# Patient Record
Sex: MALE | Race: WHITE | NOT HISPANIC OR LATINO | URBAN - METROPOLITAN AREA
[De-identification: names, ages, dates, MRNs, and addresses within clinical notes are randomized per-mention and may not be internally consistent; named-entity substitution may affect disease eponyms.]

---

## 2018-11-24 ENCOUNTER — EMERGENCY (EMERGENCY)
Facility: HOSPITAL | Age: 63
LOS: 1 days | End: 2018-11-24
Payer: COMMERCIAL

## 2018-11-24 PROCEDURE — 99284 EMERGENCY DEPT VISIT MOD MDM: CPT

## 2018-11-24 PROCEDURE — 71045 X-RAY EXAM CHEST 1 VIEW: CPT | Mod: 26

## 2018-11-24 PROCEDURE — 70450 CT HEAD/BRAIN W/O DYE: CPT | Mod: 26

## 2019-11-25 ENCOUNTER — APPOINTMENT (OUTPATIENT)
Dept: RADIOLOGY | Facility: CLINIC | Age: 64
End: 2019-11-25
Payer: COMMERCIAL

## 2019-11-25 PROCEDURE — 73502 X-RAY EXAM HIP UNI 2-3 VIEWS: CPT | Mod: LT

## 2019-11-25 PROCEDURE — 73562 X-RAY EXAM OF KNEE 3: CPT | Mod: LT

## 2020-07-27 PROBLEM — Z00.00 ENCOUNTER FOR PREVENTIVE HEALTH EXAMINATION: Status: ACTIVE | Noted: 2020-07-27

## 2020-07-28 ENCOUNTER — OUTPATIENT (OUTPATIENT)
Dept: OUTPATIENT SERVICES | Facility: HOSPITAL | Age: 65
LOS: 1 days | End: 2020-07-28

## 2020-08-08 ENCOUNTER — APPOINTMENT (OUTPATIENT)
Dept: DISASTER EMERGENCY | Facility: CLINIC | Age: 65
End: 2020-08-08

## 2020-08-08 DIAGNOSIS — Z01.818 ENCOUNTER FOR OTHER PREPROCEDURAL EXAMINATION: ICD-10-CM

## 2020-08-09 LAB — SARS-COV-2 N GENE NPH QL NAA+PROBE: NOT DETECTED

## 2020-08-11 ENCOUNTER — OUTPATIENT (OUTPATIENT)
Dept: OUTPATIENT SERVICES | Facility: HOSPITAL | Age: 65
LOS: 1 days | End: 2020-08-11

## 2020-08-11 ENCOUNTER — INPATIENT (INPATIENT)
Facility: HOSPITAL | Age: 65
LOS: 0 days | Discharge: HOME HEALTH | End: 2020-08-12
Payer: COMMERCIAL

## 2020-08-11 PROCEDURE — 72170 X-RAY EXAM OF PELVIS: CPT | Mod: 26

## 2020-08-12 ENCOUNTER — OUTPATIENT (OUTPATIENT)
Dept: OUTPATIENT SERVICES | Facility: HOSPITAL | Age: 65
LOS: 1 days | End: 2020-08-12

## 2020-08-24 ENCOUNTER — APPOINTMENT (OUTPATIENT)
Dept: ULTRASOUND IMAGING | Facility: CLINIC | Age: 65
End: 2020-08-24
Payer: COMMERCIAL

## 2020-08-24 PROCEDURE — 93970 EXTREMITY STUDY: CPT

## 2021-02-23 ENCOUNTER — APPOINTMENT (OUTPATIENT)
Dept: ULTRASOUND IMAGING | Facility: CLINIC | Age: 66
End: 2021-02-23
Payer: COMMERCIAL

## 2021-02-23 ENCOUNTER — APPOINTMENT (OUTPATIENT)
Dept: RADIOLOGY | Facility: CLINIC | Age: 66
End: 2021-02-23

## 2021-02-23 ENCOUNTER — APPOINTMENT (OUTPATIENT)
Dept: UROLOGY | Facility: CLINIC | Age: 66
End: 2021-02-23
Payer: COMMERCIAL

## 2021-02-23 VITALS
DIASTOLIC BLOOD PRESSURE: 83 MMHG | SYSTOLIC BLOOD PRESSURE: 125 MMHG | HEIGHT: 67 IN | BODY MASS INDEX: 36.1 KG/M2 | TEMPERATURE: 98.3 F | WEIGHT: 230 LBS | HEART RATE: 73 BPM

## 2021-02-23 DIAGNOSIS — Z78.9 OTHER SPECIFIED HEALTH STATUS: ICD-10-CM

## 2021-02-23 DIAGNOSIS — R39.9 UNSPECIFIED SYMPTOMS AND SIGNS INVOLVING THE GENITOURINARY SYSTEM: ICD-10-CM

## 2021-02-23 DIAGNOSIS — Z86.73 PERSONAL HISTORY OF TRANSIENT ISCHEMIC ATTACK (TIA), AND CEREBRAL INFARCTION W/OUT RESIDUAL DEFICITS: ICD-10-CM

## 2021-02-23 DIAGNOSIS — Z82.49 FAMILY HISTORY OF ISCHEMIC HEART DISEASE AND OTHER DISEASES OF THE CIRCULATORY SYSTEM: ICD-10-CM

## 2021-02-23 LAB
BILIRUB UR QL STRIP: NEGATIVE
CLARITY UR: CLEAR
COLLECTION METHOD: NORMAL
GLUCOSE UR-MCNC: NEGATIVE
HCG UR QL: 0.2 EU/DL
HGB UR QL STRIP.AUTO: NEGATIVE
KETONES UR-MCNC: NEGATIVE
LEUKOCYTE ESTERASE UR QL STRIP: NORMAL
NITRITE UR QL STRIP: NEGATIVE
PH UR STRIP: 5
PROT UR STRIP-MCNC: NEGATIVE
SP GR UR STRIP: 1.02

## 2021-02-23 PROCEDURE — 74018 RADEX ABDOMEN 1 VIEW: CPT

## 2021-02-23 PROCEDURE — 99204 OFFICE O/P NEW MOD 45 MIN: CPT | Mod: 25

## 2021-02-23 PROCEDURE — 76775 US EXAM ABDO BACK WALL LIM: CPT

## 2021-02-23 PROCEDURE — 76870 US EXAM SCROTUM: CPT

## 2021-02-23 PROCEDURE — 81003 URINALYSIS AUTO W/O SCOPE: CPT | Mod: QW

## 2021-02-23 PROCEDURE — 99072 ADDL SUPL MATRL&STAF TM PHE: CPT

## 2021-02-23 PROCEDURE — 51798 US URINE CAPACITY MEASURE: CPT

## 2021-02-23 NOTE — LETTER BODY
[Dear  ___] : Dear  [unfilled], [Courtesy Letter:] : I had the pleasure of seeing your patient, [unfilled], in my office today. [Please see my note below.] : Please see my note below. [Sincerely,] : Sincerely, [FreeTextEntry3] : Ed\par \par Jamal Hernandez MD\par [facility]\par  of Urology\par [employer] at [facility]\par

## 2021-02-23 NOTE — REVIEW OF SYSTEMS
[Eyesight Problems] : eyesight problems [Hesitancy] : urinary hesitancy [Nocturia] : nocturia [Testicular Pain] : testicular pain [Seen by urologist before (Name)  ___] : Preciously seen by a urologist: [unfilled] [Urine Infection (bladder/kidney)] : bladder/kidney infection [Pain during urination] : pain during urination [Blood in urine that you can see] : blood visible in urine [Told you have blood in urine on a urine test] : told blood was present in a urine test [History of kidney stones] : history of kidney stones [Urine retention] : urine retention [Wake up at night to urinate  How many times?  ___] : wakes up to urinate [unfilled] times during the night [Strong urge to urinate] : strong urge to urinate [Bladder pressure] : experiences bladder pressure [Bladder fullness after urinating] : bladder fullness after urinating [Increased pain/discomfort with bladder filling] : increased pain/discomfort with bladder filling [Dizziness] : dizziness [Negative] : Heme/Lymph [FreeTextEntry2] : hbp

## 2021-02-23 NOTE — HISTORY OF PRESENT ILLNESS
[FreeTextEntry1] : keila had an ESWL.  he was diagnosed because he was having pain.  no urgency.  He had some nocturia.  he also had some scrotal pain after that. He had a "water procedure" and he emptied  well. Then was good with a good stream. no urgency.  he snores a little.  he was diagnosed with sleep apnea and was given CPAP but he did not continue to use.it.  \par Was subsequently sen at [place] and diagnosed with likely epididymitis. Cipro given.

## 2021-02-23 NOTE — ASSESSMENT
[FreeTextEntry1] : histoyr fo stone and BPH\par no imaging recently\par epididymitis\par \par Plan"\par \par renal and scrotal ultrasound\par continue his antibiotics\par Follow up two weeks.

## 2021-03-16 ENCOUNTER — APPOINTMENT (OUTPATIENT)
Dept: UROLOGY | Facility: CLINIC | Age: 66
End: 2021-03-16
Payer: COMMERCIAL

## 2021-03-16 VITALS
BODY MASS INDEX: 36.88 KG/M2 | DIASTOLIC BLOOD PRESSURE: 66 MMHG | TEMPERATURE: 97.6 F | SYSTOLIC BLOOD PRESSURE: 124 MMHG | HEART RATE: 74 BPM | WEIGHT: 235 LBS | HEIGHT: 67 IN

## 2021-03-16 DIAGNOSIS — N45.1 EPIDIDYMITIS: ICD-10-CM

## 2021-03-16 PROCEDURE — 99072 ADDL SUPL MATRL&STAF TM PHE: CPT

## 2021-03-16 PROCEDURE — 99214 OFFICE O/P EST MOD 30 MIN: CPT

## 2021-03-16 RX ORDER — CIPROFLOXACIN HYDROCHLORIDE 500 MG/1
500 TABLET, FILM COATED ORAL TWICE DAILY
Qty: 20 | Refills: 0 | Status: DISCONTINUED | COMMUNITY
Start: 2021-02-23 | End: 2021-03-16

## 2021-03-16 NOTE — LETTER BODY
[Dear  ___] : Dear  [unfilled], [Courtesy Letter:] : I had the pleasure of seeing your patient, [unfilled], in my office today. [Please see my note below.] : Please see my note below. [FreeTextEntry3] : Ed\par \par Jamal Hernandez MD\par [facility]\par  of Urology\par [employer] at [facility]\par

## 2021-03-16 NOTE — ASSESSMENT
[FreeTextEntry1] : Impression:\par \par right kidney stones\par resolved epididymitis\par \par plan:\par \par CT stone\par follow up in one week.

## 2021-03-16 NOTE — HISTORY OF PRESENT ILLNESS
[FreeTextEntry1] : patient finished cipro about a week ago.  no fevers or chills. voiding ok. no urgency. no hematuria or dysuria.\par History of stones.  had previous ESWL.  I t has been over 6 months. \par \par

## 2021-03-16 NOTE — PHYSICAL EXAM
[General Appearance - Well Developed] : well developed [General Appearance - Well Nourished] : well nourished [Normal Appearance] : normal appearance [Well Groomed] : well groomed [General Appearance - In No Acute Distress] : no acute distress [FreeTextEntry1] : no testicular swelling [] : no respiratory distress [Respiration, Rhythm And Depth] : normal respiratory rhythm and effort [Exaggerated Use Of Accessory Muscles For Inspiration] : no accessory muscle use [Oriented To Time, Place, And Person] : oriented to person, place, and time [Affect] : the affect was normal [Mood] : the mood was normal [Not Anxious] : not anxious [Normal Station and Gait] : the gait and station were normal for the patient's age

## 2021-03-19 ENCOUNTER — RESULT REVIEW (OUTPATIENT)
Age: 66
End: 2021-03-19

## 2021-03-19 ENCOUNTER — APPOINTMENT (OUTPATIENT)
Dept: CT IMAGING | Facility: CLINIC | Age: 66
End: 2021-03-19
Payer: COMMERCIAL

## 2021-03-19 PROCEDURE — 74176 CT ABD & PELVIS W/O CONTRAST: CPT

## 2021-03-23 ENCOUNTER — APPOINTMENT (OUTPATIENT)
Dept: UROLOGY | Facility: CLINIC | Age: 66
End: 2021-03-23
Payer: MEDICARE

## 2021-03-23 VITALS
BODY MASS INDEX: 36.88 KG/M2 | HEART RATE: 73 BPM | WEIGHT: 235 LBS | SYSTOLIC BLOOD PRESSURE: 129 MMHG | DIASTOLIC BLOOD PRESSURE: 69 MMHG | HEIGHT: 67 IN | TEMPERATURE: 97.4 F

## 2021-03-23 PROCEDURE — 99214 OFFICE O/P EST MOD 30 MIN: CPT

## 2021-03-23 PROCEDURE — 99072 ADDL SUPL MATRL&STAF TM PHE: CPT

## 2021-03-23 NOTE — ASSESSMENT
[FreeTextEntry1] : Impression:\par \par right large stones, total stone burden 2.4 cm at minimum.\par \par I have discussed the risks, benefits and alternatives of percutaneous nephrolithotomy with the patient, including but not limited to renal injury, ureteral injury, inability to fragment or completely fragment the stone, residual stone, bleeding either within or around the kidney, prolonged urine leak, urinoma, delayed bleeding from AV fistula, need for second look procedure, either planned or not, infection including sepsis, propagation of a stone fragment into the ureter. The patient also understands the likelihood that a nephrostomy tube will be required.\par Also, because the procedure will require general anesthesia, risks inherent to general anesthesia such as heart attack, irregular heartbeat, pneumonia, or even death may occur. It is understood these risks are highly unlikely but not zero. \par The patient’s questions were answered.\par \par \par \par

## 2021-03-23 NOTE — LETTER BODY
[Dear  ___] : Dear  [unfilled], [Courtesy Letter:] : I had the pleasure of seeing your patient, [unfilled], in my office today. [Please see my note below.] : Please see my note below. [Sincerely,] : Sincerely, [FreeTextEntry3] : Ed\par \par Jamal Hernandez MD\par [facility]\par  of Urology\par [employer] at [facility]\par

## 2021-03-23 NOTE — HISTORY OF PRESENT ILLNESS
[FreeTextEntry1] : patient presents in followup.  He has been having bilateral intermittent bilateral CVA discomfort. no nausea or vomiting.  no fevers or chills.

## 2021-03-25 ENCOUNTER — NON-APPOINTMENT (OUTPATIENT)
Age: 66
End: 2021-03-25

## 2021-04-09 ENCOUNTER — OUTPATIENT (OUTPATIENT)
Dept: OUTPATIENT SERVICES | Facility: HOSPITAL | Age: 66
LOS: 1 days | End: 2021-04-09
Payer: COMMERCIAL

## 2021-04-09 VITALS
RESPIRATION RATE: 18 BRPM | TEMPERATURE: 97 F | SYSTOLIC BLOOD PRESSURE: 156 MMHG | HEART RATE: 62 BPM | HEIGHT: 68 IN | DIASTOLIC BLOOD PRESSURE: 82 MMHG | WEIGHT: 238.1 LBS

## 2021-04-09 DIAGNOSIS — N20.0 CALCULUS OF KIDNEY: ICD-10-CM

## 2021-04-09 DIAGNOSIS — Z01.818 ENCOUNTER FOR OTHER PREPROCEDURAL EXAMINATION: ICD-10-CM

## 2021-04-09 DIAGNOSIS — I10 ESSENTIAL (PRIMARY) HYPERTENSION: ICD-10-CM

## 2021-04-09 DIAGNOSIS — Z96.642 PRESENCE OF LEFT ARTIFICIAL HIP JOINT: Chronic | ICD-10-CM

## 2021-04-09 DIAGNOSIS — Z29.9 ENCOUNTER FOR PROPHYLACTIC MEASURES, UNSPECIFIED: ICD-10-CM

## 2021-04-09 DIAGNOSIS — Z91.89 OTHER SPECIFIED PERSONAL RISK FACTORS, NOT ELSEWHERE CLASSIFIED: ICD-10-CM

## 2021-04-09 LAB
A1C WITH ESTIMATED AVERAGE GLUCOSE RESULT: 6 % — HIGH (ref 4–5.6)
ANION GAP SERPL CALC-SCNC: 12 MMOL/L — SIGNIFICANT CHANGE UP (ref 5–17)
APPEARANCE UR: CLEAR — SIGNIFICANT CHANGE UP
APTT BLD: 29.2 SEC — SIGNIFICANT CHANGE UP (ref 27.5–35.5)
BACTERIA # UR AUTO: NEGATIVE — SIGNIFICANT CHANGE UP
BASOPHILS # BLD AUTO: 0.03 K/UL — SIGNIFICANT CHANGE UP (ref 0–0.2)
BASOPHILS NFR BLD AUTO: 0.5 % — SIGNIFICANT CHANGE UP (ref 0–2)
BILIRUB UR-MCNC: NEGATIVE — SIGNIFICANT CHANGE UP
BLD GP AB SCN SERPL QL: SIGNIFICANT CHANGE UP
BUN SERPL-MCNC: 19 MG/DL — SIGNIFICANT CHANGE UP (ref 8–20)
CALCIUM SERPL-MCNC: 9.1 MG/DL — SIGNIFICANT CHANGE UP (ref 8.6–10.2)
CHLORIDE SERPL-SCNC: 105 MMOL/L — SIGNIFICANT CHANGE UP (ref 98–107)
CO2 SERPL-SCNC: 24 MMOL/L — SIGNIFICANT CHANGE UP (ref 22–29)
COLOR SPEC: YELLOW — SIGNIFICANT CHANGE UP
CREAT SERPL-MCNC: 0.99 MG/DL — SIGNIFICANT CHANGE UP (ref 0.5–1.3)
DIFF PNL FLD: ABNORMAL
EOSINOPHIL # BLD AUTO: 0.19 K/UL — SIGNIFICANT CHANGE UP (ref 0–0.5)
EOSINOPHIL NFR BLD AUTO: 2.9 % — SIGNIFICANT CHANGE UP (ref 0–6)
EPI CELLS # UR: SIGNIFICANT CHANGE UP
ESTIMATED AVERAGE GLUCOSE: 126 MG/DL — HIGH (ref 68–114)
GLUCOSE SERPL-MCNC: 112 MG/DL — HIGH (ref 70–99)
GLUCOSE UR QL: NEGATIVE MG/DL — SIGNIFICANT CHANGE UP
HCT VFR BLD CALC: 49.4 % — SIGNIFICANT CHANGE UP (ref 39–50)
HGB BLD-MCNC: 16.7 G/DL — SIGNIFICANT CHANGE UP (ref 13–17)
IMM GRANULOCYTES NFR BLD AUTO: 0.5 % — SIGNIFICANT CHANGE UP (ref 0–1.5)
INR BLD: 1 RATIO — SIGNIFICANT CHANGE UP (ref 0.88–1.16)
KETONES UR-MCNC: NEGATIVE — SIGNIFICANT CHANGE UP
LEUKOCYTE ESTERASE UR-ACNC: NEGATIVE — SIGNIFICANT CHANGE UP
LYMPHOCYTES # BLD AUTO: 1.23 K/UL — SIGNIFICANT CHANGE UP (ref 1–3.3)
LYMPHOCYTES # BLD AUTO: 18.8 % — SIGNIFICANT CHANGE UP (ref 13–44)
MCHC RBC-ENTMCNC: 31.5 PG — SIGNIFICANT CHANGE UP (ref 27–34)
MCHC RBC-ENTMCNC: 33.8 GM/DL — SIGNIFICANT CHANGE UP (ref 32–36)
MCV RBC AUTO: 93 FL — SIGNIFICANT CHANGE UP (ref 80–100)
MONOCYTES # BLD AUTO: 0.89 K/UL — SIGNIFICANT CHANGE UP (ref 0–0.9)
MONOCYTES NFR BLD AUTO: 13.6 % — SIGNIFICANT CHANGE UP (ref 2–14)
NEUTROPHILS # BLD AUTO: 4.17 K/UL — SIGNIFICANT CHANGE UP (ref 1.8–7.4)
NEUTROPHILS NFR BLD AUTO: 63.7 % — SIGNIFICANT CHANGE UP (ref 43–77)
NITRITE UR-MCNC: NEGATIVE — SIGNIFICANT CHANGE UP
PH UR: 6 — SIGNIFICANT CHANGE UP (ref 5–8)
PLATELET # BLD AUTO: 213 K/UL — SIGNIFICANT CHANGE UP (ref 150–400)
POTASSIUM SERPL-MCNC: 4.3 MMOL/L — SIGNIFICANT CHANGE UP (ref 3.5–5.3)
POTASSIUM SERPL-SCNC: 4.3 MMOL/L — SIGNIFICANT CHANGE UP (ref 3.5–5.3)
PROT UR-MCNC: 15 MG/DL
PROTHROM AB SERPL-ACNC: 11.6 SEC — SIGNIFICANT CHANGE UP (ref 10.6–13.6)
RBC # BLD: 5.31 M/UL — SIGNIFICANT CHANGE UP (ref 4.2–5.8)
RBC # FLD: 13.3 % — SIGNIFICANT CHANGE UP (ref 10.3–14.5)
RBC CASTS # UR COMP ASSIST: SIGNIFICANT CHANGE UP /HPF (ref 0–4)
SODIUM SERPL-SCNC: 141 MMOL/L — SIGNIFICANT CHANGE UP (ref 135–145)
SP GR SPEC: 1.02 — SIGNIFICANT CHANGE UP (ref 1.01–1.02)
UROBILINOGEN FLD QL: NEGATIVE MG/DL — SIGNIFICANT CHANGE UP
WBC # BLD: 6.54 K/UL — SIGNIFICANT CHANGE UP (ref 3.8–10.5)
WBC # FLD AUTO: 6.54 K/UL — SIGNIFICANT CHANGE UP (ref 3.8–10.5)
WBC UR QL: SIGNIFICANT CHANGE UP

## 2021-04-09 PROCEDURE — 93005 ELECTROCARDIOGRAM TRACING: CPT

## 2021-04-09 PROCEDURE — 93010 ELECTROCARDIOGRAM REPORT: CPT

## 2021-04-09 PROCEDURE — G0463: CPT

## 2021-04-09 RX ORDER — SODIUM CHLORIDE 9 MG/ML
3 INJECTION INTRAMUSCULAR; INTRAVENOUS; SUBCUTANEOUS ONCE
Refills: 0 | Status: DISCONTINUED | OUTPATIENT
Start: 2021-04-30 | End: 2021-05-01

## 2021-04-09 NOTE — H&P PST ADULT - GASTROINTESTINAL DETAILS
soft/nontender/no distention/no masses palpable/no bruit/no rebound tenderness/no guarding/no rigidity/no organomegaly

## 2021-04-09 NOTE — H&P PST ADULT - HISTORY OF PRESENT ILLNESS
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, renal calculi, frequent UTIs, surgical history of kidney stones lithotripsy x2  reports to PST today with complaints of intermittent right flank pain, describes pain as dull and aching, denies exacerbating factors, relieved with tylenol, reports urine is orange in color  denies pain with urination, hematuria, incontinence, retention, fever chills  Patient is scheduled for right percutaneous nephrolithotomy  on 4/30/21 with Dr Hernandez  Medical and cardiac clearance pending  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 Patient reports to PST today with complaints of intermittent right flank pain, describes pain as dull and aching, denies exacerbating factors, relieved with tylenol, reports urine is orange in color. Patient denies pain with urination, hematuria, incontinence, retention, fever and chills. Patient is scheduled for right percutaneous nephrolithotomy  on 4/30/21 with Dr Hernandez  Medical and cardiac clearance pending

## 2021-04-09 NOTE — H&P PST ADULT - ASSESSMENT
CAPRINI VTE 2.0 SCORE [CLOT updated 2019]    AGE RELATED RISK FACTORS                                                       MOBILITY RELATED FACTORS  [ ] Age 41-60 years                                            (1 Point)                    [ ] Bed rest                                                        (1 Point)  [x ] Age: 61-74 years                                           (2 Points)                  [ ] Plaster cast                                                   (2 Points)  [ ] Age= 75 years                                              (3 Points)                    [ ] Bed bound for more than 72 hours                 (2 Points)    DISEASE RELATED RISK FACTORS                                               GENDER SPECIFIC FACTORS  [ ] Edema in the lower extremities                       (1 Point)              [ ] Pregnancy                                                     (1 Point)  [ ] Varicose veins                                               (1 Point)                     [ ] Post-partum < 6 weeks                                   (1 Point)             [x ] BMI > 25 Kg/m2                                            (1 Point)                     [ ] Hormonal therapy  or oral contraception          (1 Point)                 [ ] Sepsis (in the previous month)                        (1 Point)               [ ] History of pregnancy complications                 (1 point)  [ ] Pneumonia or serious lung disease                                               [ ] Unexplained or recurrent                     (1 Point)           (in the previous month)                               (1 Point)  [ ] Abnormal pulmonary function test                     (1 Point)                 SURGERY RELATED RISK FACTORS  [ ] Acute myocardial infarction                              (1 Point)               [ ]  Section                                             (1 Point)  [ ] Congestive heart failure (in the previous month)  (1 Point)      [ ] Minor surgery                                                  (1 Point)   [ ] Inflammatory bowel disease                             (1 Point)               [ ] Arthroscopic surgery                                        (2 Points)  [ ] Central venous access                                      (2 Points)                [x ] General surgery lasting more than 45 minutes (2 points)  [ ] Malignancy- Present or previous                   (2 Points)                [ ] Elective arthroplasty                                         (5 points)    [ ] Stroke (in the previous month)                          (5 Points)                                                                                                                                                           HEMATOLOGY RELATED FACTORS                                                 TRAUMA RELATED RISK FACTORS  [ ] Prior episodes of VTE                                     (3 Points)                [ ] Fracture of the hip, pelvis, or leg                       (5 Points)  [ ] Positive family history for VTE                         (3 Points)             [ ] Acute spinal cord injury (in the previous month)  (5 Points)  [ ] Prothrombin 12318 A                                     (3 Points)               [ ] Paralysis  (less than 1 month)                             (5 Points)  [ ] Factor V Leiden                                             (3 Points)                  [ ] Multiple Trauma within 1 month                        (5 Points)  [ ] Lupus anticoagulants                                     (3 Points)                                                           [ ] Anticardiolipin antibodies                               (3 Points)                                                       [ ] High homocysteine in the blood                      (3 Points)                                             [ ] Other congenital or acquired thrombophilia      (3 Points)                                                [ ] Heparin induced thrombocytopenia                  (3 Points)                                     Total Score [     5     ]    OPIOID RISK TOOL    SONY EACH BOX THAT APPLIES AND ADD TOTALS AT THE END    FAMILY HISTORY OF SUBSTANCE ABUSE                 FEMALE         MALE                                                Alcohol                             [  ]1 pt          [  ]3pts                                               Illegal Durgs                     [  ]2 pts        [  ]3pts                                               Rx Drugs                           [  ]4 pts        [  ]4 pts    PERSONAL HISTORY OF SUBSTANCE ABUSE                                                                                          Alcohol                             [  ]3 pts       [  ]3 pts                                               Illegal Durgs                     [  ]4 pts        [  ]4 pts                                               Rx Drugs                           [  ]5 pts        [  ]5 pts    AGE BETWEEN 16-45 YEARS                                      [  ]1 pt         [  ]1 pt    HISTORY OF PREADOLESCENT   SEXUAL ABUSE                                                             [  ]3 pts        [  ]0pts    PSYCHOLOGICAL DISEASE                     ADD, OCD, Bipolar, Schizophrenia        [  ]2 pts         [  ]2 pts                      Depression                                               [  ]1 pt           [  ]1 pt           SCORING TOTAL  0                                    A score of 3 or lower indicated LOW risk for future opiod abuse  A score of 4 to 7 indicated moderate risk for future opiod abuse  A score of 8 or higher indicates a high risk for opiod abuse     CAPRINI VTE 2.0 SCORE [CLOT updated 2019]    AGE RELATED RISK FACTORS                                                       MOBILITY RELATED FACTORS  [ ] Age 41-60 years                                            (1 Point)                    [ ] Bed rest                                                        (1 Point)  [x ] Age: 61-74 years                                           (2 Points)                  [ ] Plaster cast                                                   (2 Points)  [ ] Age= 75 years                                              (3 Points)                    [ ] Bed bound for more than 72 hours                 (2 Points)    DISEASE RELATED RISK FACTORS                                               GENDER SPECIFIC FACTORS  [ ] Edema in the lower extremities                       (1 Point)              [ ] Pregnancy                                                     (1 Point)  [ ] Varicose veins                                               (1 Point)                     [ ] Post-partum < 6 weeks                                   (1 Point)             [x ] BMI > 25 Kg/m2                                            (1 Point)                     [ ] Hormonal therapy  or oral contraception          (1 Point)                 [ ] Sepsis (in the previous month)                        (1 Point)               [ ] History of pregnancy complications                 (1 point)  [ ] Pneumonia or serious lung disease                                               [ ] Unexplained or recurrent                     (1 Point)           (in the previous month)                               (1 Point)  [ ] Abnormal pulmonary function test                     (1 Point)                 SURGERY RELATED RISK FACTORS  [ ] Acute myocardial infarction                              (1 Point)               [ ]  Section                                             (1 Point)  [ ] Congestive heart failure (in the previous month)  (1 Point)      [ ] Minor surgery                                                  (1 Point)   [ ] Inflammatory bowel disease                             (1 Point)               [ ] Arthroscopic surgery                                        (2 Points)  [ ] Central venous access                                      (2 Points)                [x ] General surgery lasting more than 45 minutes (2 points)  [ ] Malignancy- Present or previous                   (2 Points)                [ ] Elective arthroplasty                                         (5 points)    [ ] Stroke (in the previous month)                          (5 Points)                                                                                                                                                           HEMATOLOGY RELATED FACTORS                                                 TRAUMA RELATED RISK FACTORS  [ ] Prior episodes of VTE                                     (3 Points)                [ ] Fracture of the hip, pelvis, or leg                       (5 Points)  [ ] Positive family history for VTE                         (3 Points)             [ ] Acute spinal cord injury (in the previous month)  (5 Points)  [ ] Prothrombin 57619 A                                     (3 Points)               [ ] Paralysis  (less than 1 month)                             (5 Points)  [ ] Factor V Leiden                                             (3 Points)                  [ ] Multiple Trauma within 1 month                        (5 Points)  [ ] Lupus anticoagulants                                     (3 Points)                                                           [ ] Anticardiolipin antibodies                               (3 Points)                                                       [ ] High homocysteine in the blood                      (3 Points)                                             [ ] Other congenital or acquired thrombophilia      (3 Points)                                                [ ] Heparin induced thrombocytopenia                  (3 Points)                                     Total Score [     5     ]    OPIOID RISK TOOL    SONY EACH BOX THAT APPLIES AND ADD TOTALS AT THE END    FAMILY HISTORY OF SUBSTANCE ABUSE                 FEMALE         MALE                                                Alcohol                             [  ]1 pt          [  ]3pts                                               Illegal Durgs                     [  ]2 pts        [  ]3pts                                               Rx Drugs                           [  ]4 pts        [  ]4 pts    PERSONAL HISTORY OF SUBSTANCE ABUSE                                                                                          Alcohol                             [  ]3 pts       [  ]3 pts                                               Illegal Durgs                     [  ]4 pts        [  ]4 pts                                               Rx Drugs                           [  ]5 pts        [  ]5 pts    AGE BETWEEN 16-45 YEARS                                      [  ]1 pt         [  ]1 pt    HISTORY OF PREADOLESCENT   SEXUAL ABUSE                                                             [  ]3 pts        [  ]0pts    PSYCHOLOGICAL DISEASE                     ADD, OCD, Bipolar, Schizophrenia        [  ]2 pts         [  ]2 pts                      Depression                                               [  ]1 pt           [  ]1 pt           SCORING TOTAL  0                                    A score of 3 or lower indicated LOW risk for future opiod abuse  A score of 4 to 7 indicated moderate risk for future opiod abuse  A score of 8 or higher indicates a high risk for opiod abuse    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 Patient reports to PST today with complaints of intermittent right flank pain, describes pain as dull and aching, denies exacerbating factors, relieved with tylenol, reports urine is orange in color. Patient denies pain with urination, hematuria, incontinence, retention, fever and chills. Patient is scheduled for right percutaneous nephrolithotomy  on 21 with Dr Hernandez. Patient educated on surgical scrub, COVID testing , preadmission instructions, medical clearance and day of procedure medications, verbalizes understanding. Pt instructed to stop vitamins/supplements/herbal medications/NSAIDS for one week prior to surgery and discuss with PMD. Patient educated to take blood pressure medication as prescribed. Take x-forge and Bystolic day of procedure, patient verbalized understanding.

## 2021-04-09 NOTE — H&P PST ADULT - NSICDXPROBLEM_GEN_ALL_CORE_FT
PROBLEM DIAGNOSES  Problem: Calculus of kidney  Assessment and Plan:     Problem: Need for prophylactic measure  Assessment and Plan:     Problem: Hypertension  Assessment and Plan:        PROBLEM DIAGNOSES  Problem: Calculus of kidney  Assessment and Plan:  Patient is scheduled for right percutaneous nephrolithotomy  on 4/30/21 with Dr Hernandez. Medical and cardiac clearance pending    Problem: Need for prophylactic measure  Assessment and Plan: Caprini Score 5, at risk, surgical team to order appropriate VTE prophylaxis    Problem: Hypertension  Assessment and Plan: /82, patient states he has not taken his blood pressure medication for 2 days, patient educated on taking blood pressure medication as prescribed.  Medical and cardiac clearance pending     Problem: At risk for sleep apnea  Assessment and Plan: Stop Bang Score 7, HIGH risk, MINOR precautions

## 2021-04-09 NOTE — H&P PST ADULT - EKG AND INTERPRETATION
NSR 67 with incomplete left bundle branch block NSR 67 with incomplete left bundle branch block  pending final interpretation

## 2021-04-09 NOTE — H&P PST ADULT - NSICDXFAMILYHX_GEN_ALL_CORE_FT
FAMILY HISTORY:  Father  Still living? Unknown  Family history of kidney stones, Age at diagnosis: Age Unknown

## 2021-04-09 NOTE — H&P PST ADULT - NSANTHOSAYNRD_GEN_A_CORE
No. MINOR screening performed.  STOP BANG Legend: 0-2 = LOW Risk; 3-4 = INTERMEDIATE Risk; 5-8 = HIGH Risk

## 2021-04-10 LAB
CULTURE RESULTS: SIGNIFICANT CHANGE UP
SPECIMEN SOURCE: SIGNIFICANT CHANGE UP

## 2021-04-27 ENCOUNTER — APPOINTMENT (OUTPATIENT)
Dept: DISASTER EMERGENCY | Facility: CLINIC | Age: 66
End: 2021-04-27

## 2021-04-28 LAB — SARS-COV-2 N GENE NPH QL NAA+PROBE: NOT DETECTED

## 2021-04-29 ENCOUNTER — TRANSCRIPTION ENCOUNTER (OUTPATIENT)
Age: 66
End: 2021-04-29

## 2021-04-30 ENCOUNTER — INPATIENT (INPATIENT)
Facility: HOSPITAL | Age: 66
LOS: 0 days | Discharge: HOME | DRG: 661 | End: 2021-05-01
Attending: UROLOGY | Admitting: UROLOGY
Payer: COMMERCIAL

## 2021-04-30 ENCOUNTER — APPOINTMENT (OUTPATIENT)
Dept: UROLOGY | Facility: HOSPITAL | Age: 66
End: 2021-04-30

## 2021-04-30 ENCOUNTER — RESULT REVIEW (OUTPATIENT)
Age: 66
End: 2021-04-30

## 2021-04-30 VITALS
SYSTOLIC BLOOD PRESSURE: 156 MMHG | DIASTOLIC BLOOD PRESSURE: 90 MMHG | HEIGHT: 68 IN | HEART RATE: 78 BPM | RESPIRATION RATE: 16 BRPM | WEIGHT: 238.1 LBS | OXYGEN SATURATION: 96 % | TEMPERATURE: 98 F

## 2021-04-30 DIAGNOSIS — N20.0 CALCULUS OF KIDNEY: ICD-10-CM

## 2021-04-30 DIAGNOSIS — Z96.642 PRESENCE OF LEFT ARTIFICIAL HIP JOINT: Chronic | ICD-10-CM

## 2021-04-30 LAB
BLD GP AB SCN SERPL QL: SIGNIFICANT CHANGE UP
GLUCOSE BLDC GLUCOMTR-MCNC: 136 MG/DL — HIGH (ref 70–99)

## 2021-04-30 PROCEDURE — 88300 SURGICAL PATH GROSS: CPT | Mod: 26

## 2021-04-30 RX ORDER — ASPIRIN/CALCIUM CARB/MAGNESIUM 324 MG
81 TABLET ORAL DAILY
Refills: 0 | Status: DISCONTINUED | OUTPATIENT
Start: 2021-04-30 | End: 2021-05-01

## 2021-04-30 RX ORDER — SIMVASTATIN 20 MG/1
40 TABLET, FILM COATED ORAL AT BEDTIME
Refills: 0 | Status: DISCONTINUED | OUTPATIENT
Start: 2021-04-30 | End: 2021-05-01

## 2021-04-30 RX ORDER — AMLODIPINE VALSARTAN AND HYDROCHLOROTHIAZIDE 10; 160; 25 MG/1; MG/1; MG/1
1 TABLET, FILM COATED ORAL
Qty: 0 | Refills: 0 | DISCHARGE

## 2021-04-30 RX ORDER — DEXTROSE MONOHYDRATE, SODIUM CHLORIDE, AND POTASSIUM CHLORIDE 50; .745; 4.5 G/1000ML; G/1000ML; G/1000ML
1000 INJECTION, SOLUTION INTRAVENOUS
Refills: 0 | Status: DISCONTINUED | OUTPATIENT
Start: 2021-04-30 | End: 2021-05-01

## 2021-04-30 RX ORDER — ONDANSETRON 8 MG/1
4 TABLET, FILM COATED ORAL ONCE
Refills: 0 | Status: DISCONTINUED | OUTPATIENT
Start: 2021-04-30 | End: 2021-04-30

## 2021-04-30 RX ORDER — CEPHALEXIN 500 MG
500 CAPSULE ORAL THREE TIMES A DAY
Refills: 0 | Status: DISCONTINUED | OUTPATIENT
Start: 2021-04-30 | End: 2021-05-01

## 2021-04-30 RX ORDER — AMLODIPINE BESYLATE 2.5 MG/1
10 TABLET ORAL DAILY
Refills: 0 | Status: DISCONTINUED | OUTPATIENT
Start: 2021-04-30 | End: 2021-05-01

## 2021-04-30 RX ORDER — ASPIRIN/CALCIUM CARB/MAGNESIUM 324 MG
0 TABLET ORAL
Qty: 0 | Refills: 0 | DISCHARGE

## 2021-04-30 RX ORDER — EZETIMIBE AND SIMVASTATIN 10; 80 MG/1; MG/1
1 TABLET, FILM COATED ORAL
Qty: 0 | Refills: 0 | DISCHARGE

## 2021-04-30 RX ORDER — TAMSULOSIN HYDROCHLORIDE 0.4 MG/1
0.4 CAPSULE ORAL AT BEDTIME
Refills: 0 | Status: DISCONTINUED | OUTPATIENT
Start: 2021-04-30 | End: 2021-05-01

## 2021-04-30 RX ORDER — SODIUM CHLORIDE 9 MG/ML
1000 INJECTION, SOLUTION INTRAVENOUS
Refills: 0 | Status: DISCONTINUED | OUTPATIENT
Start: 2021-04-30 | End: 2021-04-30

## 2021-04-30 RX ORDER — VALSARTAN 80 MG/1
320 TABLET ORAL DAILY
Refills: 0 | Status: DISCONTINUED | OUTPATIENT
Start: 2021-04-30 | End: 2021-05-01

## 2021-04-30 RX ORDER — TAMSULOSIN HYDROCHLORIDE 0.4 MG/1
1 CAPSULE ORAL
Qty: 0 | Refills: 0 | DISCHARGE

## 2021-04-30 RX ORDER — HYDROCHLOROTHIAZIDE 25 MG
25 TABLET ORAL DAILY
Refills: 0 | Status: DISCONTINUED | OUTPATIENT
Start: 2021-04-30 | End: 2021-05-01

## 2021-04-30 RX ORDER — HYDROMORPHONE HYDROCHLORIDE 2 MG/ML
0.5 INJECTION INTRAMUSCULAR; INTRAVENOUS; SUBCUTANEOUS
Refills: 0 | Status: DISCONTINUED | OUTPATIENT
Start: 2021-04-30 | End: 2021-04-30

## 2021-04-30 RX ADMIN — TAMSULOSIN HYDROCHLORIDE 0.4 MILLIGRAM(S): 0.4 CAPSULE ORAL at 21:30

## 2021-04-30 RX ADMIN — DEXTROSE MONOHYDRATE, SODIUM CHLORIDE, AND POTASSIUM CHLORIDE 100 MILLILITER(S): 50; .745; 4.5 INJECTION, SOLUTION INTRAVENOUS at 17:13

## 2021-04-30 RX ADMIN — Medication 500 MILLIGRAM(S): at 14:37

## 2021-04-30 RX ADMIN — Medication 500 MILLIGRAM(S): at 21:31

## 2021-04-30 NOTE — PROGRESS NOTE ADULT - SUBJECTIVE AND OBJECTIVE BOX
Urology PA post op check;    STATUS POST:      Brief Operative Note [Charted Location: [address] [address]] [Authored: 30-Apr-2021 11:57]- for Visit: [id number], Complete, Entered, Signed in Full, General    General:    General:  · Primary Surgeon	Jamal Hernandez MD  · Assistant(s)	none  · Type of Anesthesia	General  · Elective procedure	Yes    Pre-Op Diagnosis, Post-Op Diagnosis and Procedure:    Pre-Op, Post-Op and Procedure Selector:  ·  PRE-OP DIAGNOSIS:  Kidney stone 30-Apr-2021 11:58:20  Jamal Hernandez.  ·  POST-OP DIAGNOSIS:  Kidney stone 30-Apr-2021 11:58:28  Jamal Hernandez.  ·  PROCEDURES:  Percutaneous removal of calculus of kidney, 2 cm or more in diameter 30-Apr-2021 11:57:55  Jamal Hernandez  Nephrostogram 30-Apr-2021 11:58:08  Jamal Hernandez.       Operative Findings:  · Operative Findings	right kidney stones, bright yellow, very friable, about a hundred small pellets and 2.5 cm stone     Evidence of Infection or Abscess:  · Evidence of infection or abscess identified at the start or during the surgical procedure:	No    Specimens/Blood Loss/IV/Output/Protocol/VTE:    Specimens/Blood Loss/IV/Output/Protocol/VTE:  · Specimens	stone for chemical analysis  · Estimated Blood Loss	20 milliLiter(s)      POST OPERATIVE DAY #:     Vital Signs Last 24 Hrs  T(C): 36.8 (30 Apr 2021 19:35), Max: 36.8 (30 Apr 2021 19:35)  T(F): 98.3 (30 Apr 2021 19:35), Max: 98.3 (30 Apr 2021 19:35)  HR: 67 (30 Apr 2021 19:35) (60 - 78)  BP: 133/68 (30 Apr 2021 19:35) (110/86 - 167/99)  BP(mean): 90 (30 Apr 2021 13:41) (74 - 90)  RR: 18 (30 Apr 2021 19:35) (16 - 19)  SpO2: 93% (30 Apr 2021 19:35) (92% - 100%)    HPI: 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 Patient reports to PST today with complaints of intermittent right flank pain, describes pain as dull and aching, denies exacerbating factors, relieved with tylenol, reports urine is orange in color. Patient denies pain with urination, hematuria, incontinence, retention, fever and chills. Patient is scheduled for right percutaneous nephrolithotomy  on 4/30/21 with Dr Hernandez        Calculus of kidney    Family history of kidney stones (Father)    Handoff    MEWS Score    Calculus of kidney    Hypertension    Hyperlipidemia    Stroke    Kidney stone    Kidney stone    Percutaneous removal of calculus of kidney, 2 cm or more in diameter    Nephrostogram    History of hip replacement, total, left    ENCOUNTER FOR OTHER PREPROCEDU    SysAdmin_VstLnk        SUBJECTIVE: Pt seen lying supine with HOB up, no c/o pain, torito po well, no discomfort in right flank, R NT to BSD, alvares to BSD    Diet:    Activity:     Fevers: [ ]Yes [ ]NO  Chills: [ ] Yes [ ] NO  SOB:  [ ] YES [ ] NO  Dyspnea: [ ]YES [ ]NO  Chest Discomfort: [ ] YES [ ] NO    Nausea: [ ] YES [ ] NO           Vomiting: [ ] YES [ ] NO  Flatus: [ ] YES [ ] NO             Bowel Movement: [ ] YES [ ] NO  Diarrhea: [ ] YES [ ] NO         Void: [ ]YES [ ]No  Constipation: [ ] YES [ ] NO       Pain (0-10):              Pain Control Adequate: [ ] YES [ ] NO    Alvares:    NGT:      I&O's Detail    30 Apr 2021 07:01  -  30 Apr 2021 21:49  --------------------------------------------------------  IN:    Lactated Ringers: 75 mL  Total IN: 75 mL    OUT:    Indwelling Catheter - Urethral (mL): 500 mL    Nephrostomy Tube (mL): 400 mL    Voided (mL): 150 mL  Total OUT: 1050 mL    Total NET: -975 mL        I&O's Summary    30 Apr 2021 07:01  -  30 Apr 2021 21:49  --------------------------------------------------------  IN: 75 mL / OUT: 1050 mL / NET: -975 mL          MEDICATIONS  (STANDING):  amLODIPine   Tablet 10 milliGRAM(s) Oral daily  aspirin enteric coated 81 milliGRAM(s) Oral daily  cephalexin 500 milliGRAM(s) Oral three times a day  hydrochlorothiazide 25 milliGRAM(s) Oral daily  simvastatin 40 milliGRAM(s) Oral at bedtime  sodium chloride 0.45% with potassium chloride 20 mEq/L 1000 milliLiter(s) (100 mL/Hr) IV Continuous <Continuous>  sodium chloride 0.9% lock flush 3 milliLiter(s) IV Push Once  tamsulosin 0.4 milliGRAM(s) Oral at bedtime  valsartan 320 milliGRAM(s) Oral daily    MEDICATIONS  (PRN):      LABS:                  RADIOLOGY & ADDITIONAL STUDIES:

## 2021-04-30 NOTE — BRIEF OPERATIVE NOTE - NSICDXBRIEFPROCEDURE_GEN_ALL_CORE_FT
PROCEDURES:  Percutaneous removal of calculus of kidney, 2 cm or more in diameter 30-Apr-2021 11:57:55  Jamal Hernandez  Nephrostogram 30-Apr-2021 11:58:08  Jamal Hernandez

## 2021-04-30 NOTE — PROCEDURE NOTE - PROCEDURE FINDINGS AND DETAILS
right lower pole stones are not radiopaque.  100 cc intravenous contrast given for IVP  5f nephru cathter placed

## 2021-04-30 NOTE — PROGRESS NOTE ADULT - GENITOURINARY COMMENTS
R NT dressing with mild blood staining, R NT to BSD - blood tinged with minimal debri in tubing, alvares to BSD- clear/blood tinged

## 2021-05-01 ENCOUNTER — TRANSCRIPTION ENCOUNTER (OUTPATIENT)
Age: 66
End: 2021-05-01

## 2021-05-01 VITALS
HEART RATE: 72 BPM | TEMPERATURE: 98 F | DIASTOLIC BLOOD PRESSURE: 81 MMHG | SYSTOLIC BLOOD PRESSURE: 153 MMHG | OXYGEN SATURATION: 94 % | RESPIRATION RATE: 18 BRPM

## 2021-05-01 LAB
ANION GAP SERPL CALC-SCNC: 10 MMOL/L — SIGNIFICANT CHANGE UP (ref 5–17)
BUN SERPL-MCNC: 18 MG/DL — SIGNIFICANT CHANGE UP (ref 8–20)
CALCIUM SERPL-MCNC: 8.9 MG/DL — SIGNIFICANT CHANGE UP (ref 8.6–10.2)
CHLORIDE SERPL-SCNC: 105 MMOL/L — SIGNIFICANT CHANGE UP (ref 98–107)
CO2 SERPL-SCNC: 24 MMOL/L — SIGNIFICANT CHANGE UP (ref 22–29)
COVID-19 SPIKE DOMAIN AB INTERP: NEGATIVE — SIGNIFICANT CHANGE UP
COVID-19 SPIKE DOMAIN ANTIBODY RESULT: 0.4 U/ML — SIGNIFICANT CHANGE UP
CREAT SERPL-MCNC: 1.1 MG/DL — SIGNIFICANT CHANGE UP (ref 0.5–1.3)
GLUCOSE SERPL-MCNC: 154 MG/DL — HIGH (ref 70–99)
HCT VFR BLD CALC: 44.2 % — SIGNIFICANT CHANGE UP (ref 39–50)
HGB BLD-MCNC: 14.7 G/DL — SIGNIFICANT CHANGE UP (ref 13–17)
MCHC RBC-ENTMCNC: 30.9 PG — SIGNIFICANT CHANGE UP (ref 27–34)
MCHC RBC-ENTMCNC: 33.3 GM/DL — SIGNIFICANT CHANGE UP (ref 32–36)
MCV RBC AUTO: 93.1 FL — SIGNIFICANT CHANGE UP (ref 80–100)
PLATELET # BLD AUTO: 173 K/UL — SIGNIFICANT CHANGE UP (ref 150–400)
POTASSIUM SERPL-MCNC: 4.1 MMOL/L — SIGNIFICANT CHANGE UP (ref 3.5–5.3)
POTASSIUM SERPL-SCNC: 4.1 MMOL/L — SIGNIFICANT CHANGE UP (ref 3.5–5.3)
RBC # BLD: 4.75 M/UL — SIGNIFICANT CHANGE UP (ref 4.2–5.8)
RBC # FLD: 13.2 % — SIGNIFICANT CHANGE UP (ref 10.3–14.5)
SARS-COV-2 IGG+IGM SERPL QL IA: 0.4 U/ML — SIGNIFICANT CHANGE UP
SARS-COV-2 IGG+IGM SERPL QL IA: NEGATIVE — SIGNIFICANT CHANGE UP
SODIUM SERPL-SCNC: 139 MMOL/L — SIGNIFICANT CHANGE UP (ref 135–145)
WBC # BLD: 11.3 K/UL — HIGH (ref 3.8–10.5)
WBC # FLD AUTO: 11.3 K/UL — HIGH (ref 3.8–10.5)

## 2021-05-01 PROCEDURE — 36415 COLL VENOUS BLD VENIPUNCTURE: CPT

## 2021-05-01 PROCEDURE — 80048 BASIC METABOLIC PNL TOTAL CA: CPT

## 2021-05-01 PROCEDURE — C1894: CPT

## 2021-05-01 PROCEDURE — 76000 FLUOROSCOPY <1 HR PHYS/QHP: CPT

## 2021-05-01 PROCEDURE — 76942 ECHO GUIDE FOR BIOPSY: CPT

## 2021-05-01 PROCEDURE — 85027 COMPLETE CBC AUTOMATED: CPT

## 2021-05-01 PROCEDURE — 82365 CALCULUS SPECTROSCOPY: CPT

## 2021-05-01 PROCEDURE — 74018 RADEX ABDOMEN 1 VIEW: CPT

## 2021-05-01 PROCEDURE — C1769: CPT

## 2021-05-01 PROCEDURE — 86769 SARS-COV-2 COVID-19 ANTIBODY: CPT

## 2021-05-01 PROCEDURE — 88300 SURGICAL PATH GROSS: CPT

## 2021-05-01 PROCEDURE — 74018 RADEX ABDOMEN 1 VIEW: CPT | Mod: 26

## 2021-05-01 PROCEDURE — C1726: CPT

## 2021-05-01 PROCEDURE — 86901 BLOOD TYPING SEROLOGIC RH(D): CPT

## 2021-05-01 PROCEDURE — 86900 BLOOD TYPING SEROLOGIC ABO: CPT

## 2021-05-01 PROCEDURE — 82962 GLUCOSE BLOOD TEST: CPT

## 2021-05-01 PROCEDURE — C1889: CPT

## 2021-05-01 PROCEDURE — 86850 RBC ANTIBODY SCREEN: CPT

## 2021-05-01 RX ORDER — CEPHALEXIN 500 MG
1 CAPSULE ORAL
Qty: 15 | Refills: 0
Start: 2021-05-01 | End: 2021-05-05

## 2021-05-01 RX ADMIN — Medication 500 MILLIGRAM(S): at 13:06

## 2021-05-01 RX ADMIN — Medication 25 MILLIGRAM(S): at 05:49

## 2021-05-01 RX ADMIN — AMLODIPINE BESYLATE 10 MILLIGRAM(S): 2.5 TABLET ORAL at 05:49

## 2021-05-01 RX ADMIN — Medication 500 MILLIGRAM(S): at 05:49

## 2021-05-01 RX ADMIN — Medication 81 MILLIGRAM(S): at 13:06

## 2021-05-01 RX ADMIN — VALSARTAN 320 MILLIGRAM(S): 80 TABLET ORAL at 05:49

## 2021-05-01 NOTE — DISCHARGE NOTE PROVIDER - CARE PROVIDER_API CALL
Jamal Hernandez)  Urology  [address], [address]  [place] [address]  Phone: [phone]  Fax: [phone]  Follow Up Time:

## 2021-05-01 NOTE — DISCHARGE NOTE PROVIDER - NSDCMRMEDTOKEN_GEN_ALL_CORE_FT
amlodipine/valsartan/hydrochlorothiazide 10 mg-320 mg-25 mg oral tablet: 1 tab(s) orally once a day  aspirin 81 mg oral tablet:   cephalexin 500 mg oral capsule: 1 cap(s) orally 3 times a day  tamsulosin 0.4 mg oral capsule: 1 cap(s) orally once a day  Vytorin 10 mg-40 mg oral tablet: 1 tab(s) orally once a day

## 2021-05-01 NOTE — DISCHARGE NOTE PROVIDER - HOSPITAL COURSE
Patient was admitted to the Urology service under Dr. Hernandez and underwent a R PCNL w/ R nephrostomy tube placement. No complications with surgery and patient was transferred to the floor in stable condition. Yeh was removed on POD #1 and passed TOV. R NT was clamped on POD #1 and urine remained ................... after clamping. NT was removed/unclamped?????????? Patient tolerating regular PO diet well. OOB and ambulatory. Pain was well controlled at time of discharge. Patient was stable for discharge home. Patient was admitted to the Urology service under Dr. Hernandez and underwent a R PCNL w/ R nephrostomy tube placement. No complications with surgery and patient was transferred to the floor in stable condition. Yeh was removed on POD #1 and passed TOV. R NT was clamped on POD #1 and patient developed severe pain. NT was unclamped and pain resolved once urine drained. Patient was given legbag instructions prior to discharge. Patient tolerating regular PO diet well. OOB and ambulatory. Pain was well controlled at time of discharge. Patient was stable for discharge home with NT in place and legbag for drainage.

## 2021-05-01 NOTE — DISCHARGE NOTE PROVIDER - NSDCCPCAREPLAN_GEN_ALL_CORE_FT
PRINCIPAL DISCHARGE DIAGNOSIS  Diagnosis: Calculus of right kidney  Assessment and Plan of Treatment: Follow Up: Please call to make an appointment w/ Dr. Hernandez on 5/3vs5/6. Also, please call to make an appointment with your primary care physician as per your usual schedule.   Activity: May return to normal activities as tolerated, however refrain from heavy lifting >10-15 pounds.   Diet: May continue regular diet.  Medications: Please take all home medications as prescribed by your primary care doctor. You are encouraged to take over-the-counter tylenol and/or ibuprofen for pain relief. Keflex (antibiotic) was sent to your pharmacy. Please take for another 5 days as prescribed.   Wound Care: Please, keep wound site clean. You may shower, but do not bathe.   Patient is advised to RETURN TO THE EMERGENCY DEPARTMENT for any of the following - worsening pain, fever/chills, nausea/vomiting, alterned mental status, chest pain, shortness of breath, or any other new/worsening symptoms.       PRINCIPAL DISCHARGE DIAGNOSIS  Diagnosis: Calculus of right kidney  Assessment and Plan of Treatment: Follow Up: Please call Dr. Hernandez's office on Monday 5/3 for instructions on where to get nephrostogram prior to follow up visit.   Activity: May return to normal activities as tolerated, however refrain from heavy lifting >10-15 pounds and be careful with objects getting caught on nephrostomy tube.   Diet: May continue regular diet.  Medications: Please take all home medications as prescribed by your primary care doctor. You are encouraged to take over-the-counter tylenol and/or ibuprofen for pain relief. Keflex (antibiotic) was sent to your pharmacy. Please take for another 5 days as prescribed.   Wound Care: Please, keep wound site clean. You may shower, but do not bathe.   Patient is advised to RETURN TO THE EMERGENCY DEPARTMENT for any of the following - worsening pain, fever/chills, nausea/vomiting, alterned mental status, chest pain, shortness of breath, or any other new/worsening symptoms.

## 2021-05-01 NOTE — DISCHARGE NOTE NURSING/CASE MANAGEMENT/SOCIAL WORK - PATIENT PORTAL LINK FT
You can access the FollowMyHealth Patient Portal offered by [facility] by registering at the following website: [email or web address]. By joining [email or web address]’s FollowMyHealth portal, you will also be able to view your health information using other applications (apps) compatible with our system.

## 2021-05-04 PROBLEM — I63.9 CEREBRAL INFARCTION, UNSPECIFIED: Chronic | Status: ACTIVE | Noted: 2021-04-09

## 2021-05-04 PROBLEM — E78.5 HYPERLIPIDEMIA, UNSPECIFIED: Chronic | Status: ACTIVE | Noted: 2021-04-09

## 2021-05-04 PROBLEM — I10 ESSENTIAL (PRIMARY) HYPERTENSION: Chronic | Status: ACTIVE | Noted: 2021-04-09

## 2021-05-04 PROBLEM — N20.0 CALCULUS OF KIDNEY: Chronic | Status: ACTIVE | Noted: 2021-04-09

## 2021-05-05 ENCOUNTER — RESULT REVIEW (OUTPATIENT)
Age: 66
End: 2021-05-05

## 2021-05-05 ENCOUNTER — APPOINTMENT (OUTPATIENT)
Dept: INTERVENTIONAL RADIOLOGY/VASCULAR | Facility: CLINIC | Age: 66
End: 2021-05-05
Payer: COMMERCIAL

## 2021-05-05 ENCOUNTER — OUTPATIENT (OUTPATIENT)
Dept: OUTPATIENT SERVICES | Facility: HOSPITAL | Age: 66
LOS: 1 days | End: 2021-05-05

## 2021-05-05 VITALS
SYSTOLIC BLOOD PRESSURE: 137 MMHG | OXYGEN SATURATION: 95 % | TEMPERATURE: 98 F | RESPIRATION RATE: 16 BRPM | HEART RATE: 77 BPM | DIASTOLIC BLOOD PRESSURE: 84 MMHG

## 2021-05-05 DIAGNOSIS — Z96.642 PRESENCE OF LEFT ARTIFICIAL HIP JOINT: Chronic | ICD-10-CM

## 2021-05-05 DIAGNOSIS — N20.0 CALCULUS OF KIDNEY: ICD-10-CM

## 2021-05-05 DIAGNOSIS — Z86.79 PERSONAL HISTORY OF OTHER DISEASES OF THE CIRCULATORY SYSTEM: ICD-10-CM

## 2021-05-05 PROCEDURE — 50431 NJX PX NFROSGRM &/URTRGRM: CPT | Mod: RT

## 2021-05-05 NOTE — HISTORY OF PRESENT ILLNESS
[FreeTextEntry1] : PRE CALL PRIOR TO APPOINTMENT: \par \par Phone Number: [phone]\par \par Diagnosis: Kidney stones\par \par COVID-19 SWAB: N/A NOT NEEDED FOR NEPHROSTOMY TUBE STUDY \par \par RX on file: YES \par \par Referring MD: Jamal Hernandez\par \par Appointment date: 5/5/2021\par \par Clotting or Bleeding disorders: PATIENT DENIES \par \par PPM / Defibrillator: PATIENT DENIES \par \par NPO status advised: N/A \par \par History of fall: None   \par \par Assistant device for walking: N/A\par \par  home: N/A\par \par Blood Thinners: No                             \par \par Prescribing MD agree to have blood thinner medication held: N/A\par \par Capacity to make decisions: Yes\par \par HCP: N/A \par \par DNR: N/A\par \par Person contact for Pre-Call: N/A\par \par --------------------------------------------------------------------------------------------------------\par \par \par \par Norma- Operative Assessment: (Day of Procedure)\par \par NPO status: N/a\par \par Falls risk: none\par \par Labs: N/A NOT NEEDED FOR TUBE STUDY    \par \par IVL: N/A\par \par IR MD: Dr. Nelson Arriaga \par \par Urine Pregnancy: N/A\par \par PRE-OP instructions: yes\par \par POST-OP teaching initiated: YES\par \par Allergy bracelet on: Yes\par \par Antibiotic given:  NA\par \par

## 2021-05-05 NOTE — ASSESSMENT
[FreeTextEntry1] : right CT/AP and nephrostogram showed no significant resideal stone.  no obstructiion

## 2021-05-06 ENCOUNTER — APPOINTMENT (OUTPATIENT)
Dept: UROLOGY | Facility: CLINIC | Age: 66
End: 2021-05-06
Payer: COMMERCIAL

## 2021-05-06 VITALS — TEMPERATURE: 96.9 F | DIASTOLIC BLOOD PRESSURE: 83 MMHG | SYSTOLIC BLOOD PRESSURE: 134 MMHG | HEART RATE: 71 BPM

## 2021-05-06 LAB — NIDUS STONE QN: SIGNIFICANT CHANGE UP

## 2021-05-06 PROCEDURE — 99024 POSTOP FOLLOW-UP VISIT: CPT

## 2021-05-06 NOTE — ASSESSMENT
[FreeTextEntry1] : neph tube removed. \par doing well. \par \par Stone analysis pending. \par \par Follow up 2 weeks.  \par to change cressing daily and PRN

## 2021-05-10 LAB — SURGICAL PATHOLOGY STUDY: SIGNIFICANT CHANGE UP

## 2021-05-25 ENCOUNTER — APPOINTMENT (OUTPATIENT)
Dept: UROLOGY | Facility: CLINIC | Age: 66
End: 2021-05-25
Payer: COMMERCIAL

## 2021-05-25 ENCOUNTER — NON-APPOINTMENT (OUTPATIENT)
Age: 66
End: 2021-05-25

## 2021-05-25 VITALS
TEMPERATURE: 97.3 F | HEIGHT: 67 IN | WEIGHT: 235 LBS | BODY MASS INDEX: 36.88 KG/M2 | SYSTOLIC BLOOD PRESSURE: 136 MMHG | HEART RATE: 71 BPM | DIASTOLIC BLOOD PRESSURE: 81 MMHG

## 2021-05-25 DIAGNOSIS — N20.0 CALCULUS OF KIDNEY: ICD-10-CM

## 2021-05-25 PROCEDURE — 99024 POSTOP FOLLOW-UP VISIT: CPT

## 2021-05-25 RX ORDER — AMLODIPINE AND VALSARTAN 10; 320 MG/1; MG/1
10-320 TABLET, FILM COATED ORAL DAILY
Qty: 30 | Refills: 0 | Status: ACTIVE | COMMUNITY
Start: 2021-05-25

## 2021-05-25 RX ORDER — ASPIRIN 81 MG/1
81 TABLET, CHEWABLE ORAL
Qty: 30 | Refills: 0 | Status: ACTIVE | COMMUNITY
Start: 2021-05-25

## 2021-05-25 NOTE — ASSESSMENT
[FreeTextEntry1] : Impression:\par \par Kidney stone\par \par Plan:\par 24 hour urine\par KUB \par follow up in 4 weeks.

## 2021-05-25 NOTE — LETTER BODY
[Dear  ___] : Dear  [unfilled], [Courtesy Letter:] : I had the pleasure of seeing your patient, [unfilled], in my office today. [Please see my note below.] : Please see my note below. [Sincerely,] : Sincerely, [FreeTextEntry3] : Ed\par \par Jamal Hernandez MD\par [facility]\par  of Urology\par [employer] [facility]\par

## 2021-06-22 ENCOUNTER — APPOINTMENT (OUTPATIENT)
Dept: UROLOGY | Facility: CLINIC | Age: 66
End: 2021-06-22

## 2022-01-24 ENCOUNTER — APPOINTMENT (OUTPATIENT)
Dept: CT IMAGING | Facility: CLINIC | Age: 67
End: 2022-01-24
Payer: COMMERCIAL

## 2022-01-24 ENCOUNTER — APPOINTMENT (OUTPATIENT)
Dept: ULTRASOUND IMAGING | Facility: CLINIC | Age: 67
End: 2022-01-24
Payer: COMMERCIAL

## 2022-01-24 PROCEDURE — 82565 ASSAY OF CREATININE: CPT | Mod: QW

## 2022-01-24 PROCEDURE — 74178 CT ABD&PLV WO CNTR FLWD CNTR: CPT | Mod: MH

## 2022-01-24 PROCEDURE — 76870 US EXAM SCROTUM: CPT

## 2022-01-25 ENCOUNTER — APPOINTMENT (OUTPATIENT)
Dept: UROLOGY | Facility: CLINIC | Age: 67
End: 2022-01-25

## 2022-03-10 ENCOUNTER — APPOINTMENT (OUTPATIENT)
Dept: OPHTHALMOLOGY | Facility: CLINIC | Age: 67
End: 2022-03-10
Payer: COMMERCIAL

## 2022-03-10 ENCOUNTER — NON-APPOINTMENT (OUTPATIENT)
Age: 67
End: 2022-03-10

## 2022-03-10 PROCEDURE — 65222 REMOVE FOREIGN BODY FROM EYE: CPT | Mod: RT

## 2022-03-17 ENCOUNTER — APPOINTMENT (OUTPATIENT)
Dept: OPHTHALMOLOGY | Facility: CLINIC | Age: 67
End: 2022-03-17
Payer: COMMERCIAL

## 2022-03-17 ENCOUNTER — NON-APPOINTMENT (OUTPATIENT)
Age: 67
End: 2022-03-17

## 2022-03-17 PROCEDURE — 92014 COMPRE OPH EXAM EST PT 1/>: CPT

## 2022-07-07 ENCOUNTER — APPOINTMENT (OUTPATIENT)
Dept: OPHTHALMOLOGY | Facility: CLINIC | Age: 67
End: 2022-07-07

## 2022-07-07 ENCOUNTER — NON-APPOINTMENT (OUTPATIENT)
Age: 67
End: 2022-07-07

## 2022-07-07 PROCEDURE — 99213 OFFICE O/P EST LOW 20 MIN: CPT

## 2022-08-12 ENCOUNTER — RESULT REVIEW (OUTPATIENT)
Age: 67
End: 2022-08-12

## 2022-12-05 ENCOUNTER — APPOINTMENT (OUTPATIENT)
Dept: OPHTHALMOLOGY | Facility: CLINIC | Age: 67
End: 2022-12-05

## 2022-12-05 ENCOUNTER — NON-APPOINTMENT (OUTPATIENT)
Age: 67
End: 2022-12-05

## 2022-12-05 PROCEDURE — 92014 COMPRE OPH EXAM EST PT 1/>: CPT

## 2022-12-24 NOTE — H&P PST ADULT - REASON FOR ADMISSION
PRIMARY DIAGNOSIS: Afib with RVR  OUTPATIENT/OBSERVATION GOALS TO BE MET BEFORE DISCHARGE:  ADLs back to baseline: Yes    Activity and level of assistance: Up with standby assistance.    Pain status: Pain free.    Return to near baseline physical activity: Yes     Discharge Planner Nurse   Safe discharge environment identified: Yes  Barriers to discharge: Yes, cardiololgy consult, Echo in the morning. Heparin gtt.        Entered by: Rebecca Lou RN 12/24/2022 3:10 AM     Please review provider order for any additional goals.   Nurse to notify provider when observation goals have been met and patient is ready for discharge.Goal Outcome Evaluation:                         "I am having kidney stones removed"

## 2023-06-05 ENCOUNTER — APPOINTMENT (OUTPATIENT)
Dept: OPHTHALMOLOGY | Facility: CLINIC | Age: 68
End: 2023-06-05
Payer: COMMERCIAL

## 2023-06-05 ENCOUNTER — NON-APPOINTMENT (OUTPATIENT)
Age: 68
End: 2023-06-05

## 2023-06-05 PROCEDURE — 92014 COMPRE OPH EXAM EST PT 1/>: CPT

## 2023-09-15 ASSESSMENT — HOOS JR
IMPORTED LATERALITY: LEFT
WALKING ON UNEVEN SURFACE: MODERATE
WALKING ON UNEVEN SURFACE: MODERATE
BENDING TO THE FLOOR TO PICK UP OBJECT: SEVERE
LYING IN BED (TURNING OVER, MAINTAINING HIP POSITION): MILD
HOOS JR RAW SCORE: 10
RISING FROM SITTING: MILD
LYING IN BED (TURNING OVER, MAINTAINING HIP POSITION): MILD
RISING FROM SITTING: MILD
GOING UP OR DOWN STAIRS: MODERATE
SITTING: MILD
BENDING TO THE FLOOR TO PICK UP OBJECT: SEVERE
IMPORTED LATERALITY: LEFT
HOOS JR RAW SCORE: 10
IMPORTED HOOS JR SCORE: 58.93
IMPORTED FORM: YES
IMPORTED HOOS JR SCORE: 58.93
SITTING: MILD
GOING UP OR DOWN STAIRS: MODERATE
IMPORTED FORM: YES

## 2023-12-01 NOTE — ASU PREOP CHECKLIST - BMI (KG/M2)
12/1/2023    Assessment/Plan      Diagnoses and all orders for this visit:    Type 2 diabetes mellitus without complication, without long-term current use of insulin (HCC)  -     POCT hemoglobin A1c  -     Comprehensive metabolic panel  -     Lipid Panel with Direct LDL reflex    Hypogonadism in male  -     CBC and differential  -     Albumin / creatinine urine ratio  -     TSH, 3rd generation  -     Testosterone, free, total Lab Collect    History of prolactinoma    Essential hypertension    Hyperlipidemia, unspecified hyperlipidemia type    Prostate cancer screening  -     PSA, total screen Lab Collect    Erectile dysfunction, unspecified erectile dysfunction type  -     vardenafil (LEVITRA) 10 MG tablet; Take 1 tablet (10 mg total) by mouth daily as needed for erectile dysfunction        Assessment/Plan:  1. Type 2 diabetes: Point-of-care A1c in the office today remains well-controlled on current regimen. Will update other labs as ordered above and contact patient with results. 2.  Hypertension: Continue losartan. 3.  Hyperlipidemia: Continue statin. 4.  Hypogonadism: We will update lab work as ordered above. Patient states he has not been able to acquire his prescription from the pharmacy so we will inquire about this as it appears prescription was sent over correctly on our end. CC: Diabetes follow-up    History of Present Illness     HPI: Heaven Reyes is a 66y.o. year old male with type 2 diabetes for  about 7  years. He is on oral agents at home and takes metformin 1000 mg bid. He denies any polyuria, polydipsia, nocturia and blurry vision. He denies neuropathy, nephropathy, and retinopathy. Hypoglycemic episodes: No.     Eye exam: Sees optometry at [facility] as well. Foot exam: Sees Dr Rodo Butler at [facility] twice a year. Blood Sugar/Glucometer/Pump/CGM review: No logs to review. HLD: Simvastatin. HTN: Losartan.     He  also has a history of prolactinoma that was treated with dopamine agonist therapy for many years. This was stopped after prolactin levels were controlled an MRI in 2017 showed no evidence of pituitary lesion. Testosterone level did not improve after correction of prolactin so he continues on testosterone supplementation with 30 mg per actuation, 3 pumps daily. Review of Systems   Constitutional:  Negative for fatigue. HENT:  Negative for trouble swallowing and voice change. Eyes:  Negative for visual disturbance. Respiratory:  Negative for shortness of breath. Cardiovascular:  Negative for palpitations and leg swelling. Gastrointestinal:  Negative for abdominal pain, nausea and vomiting. Endocrine: Negative for polydipsia and polyuria. Musculoskeletal:  Negative for arthralgias and myalgias. Skin:  Negative for rash. Neurological:  Negative for dizziness, tremors and weakness. Hematological:  Negative for adenopathy. Psychiatric/Behavioral:  Negative for agitation and confusion. Historical Information   History reviewed. No pertinent past medical history.   Past Surgical History:   Procedure Laterality Date    ORIF PROXIMAL TIBIAL PLATEAU FRACTURE Left      Social History   Social History     Substance and Sexual Activity   Alcohol Use Not Currently     Social History     Substance and Sexual Activity   Drug Use Not Currently    Types: Marijuana     Social History     Tobacco Use   Smoking Status Former    Packs/day: 0.50    Years: 25.00    Total pack years: 12.50    Types: Cigarettes    Start date: 10/20/1975    Quit date: 2001    Years since quittin.9   Smokeless Tobacco Never     Family History:   Family History   Problem Relation Age of Onset    Breast cancer Mother     Alzheimer's disease Mother     Hypertension Mother     Diabetes type II Father     Diabetes unspecified Brother        Meds/Allergies   Current Outpatient Medications   Medication Sig Dispense Refill    cholecalciferol (VITAMIN D3) 1,000 units tablet Take 1,000 Units by mouth daily      escitalopram (LEXAPRO) 20 mg tablet Take 20 mg by mouth daily      famotidine (PEPCID) 40 MG tablet Take 40 mg by mouth 2 (two) times a day as needed for heartburn or indigestion      fluticasone (FLONASE) 50 mcg/act nasal spray 1 spray into each nostril daily      losartan (COZAAR) 100 MG tablet Take 100 mg by mouth daily      metFORMIN (GLUCOPHAGE) 1000 MG tablet Take 1,000 mg by mouth 2 (two) times a day with meals  1    simvastatin (ZOCOR) 40 mg tablet Take 40 mg by mouth daily  2    Testosterone 30 MG/ACT SOLN APPLY 3 PUMPS TOTAL DAILY 90 mL 0    vardenafil (LEVITRA) 10 MG tablet Take 1 tablet (10 mg total) by mouth daily as needed for erectile dysfunction 10 tablet 0    Blood Glucose Monitoring Suppl (FREESTYLE FREEDOM LITE) w/Device KIT Test blood sugar once daily (Patient not taking: Reported on 10/21/2022) 1 each 0    glucose blood (FREESTYLE LITE) test strip Test blood sugar once daily (Patient not taking: Reported on 10/21/2022) 100 each 5    Lancets (FREESTYLE) lancets Test blood sugar once daily (Patient not taking: Reported on 10/21/2022) 100 each 5     No current facility-administered medications for this visit. Allergies   Allergen Reactions    Contrast [Iodinated Contrast Media] Shortness Of Breath and Itching       Objective   Vitals: Blood pressure 128/78, pulse 76, height 6' 2.5" (1.892 m), weight 117 kg (258 lb 12.8 oz), SpO2 98 %. Invasive Devices       None                   Physical Exam  Vitals reviewed. Constitutional:       General: He is not in acute distress. Appearance: He is well-developed. He is not diaphoretic. HENT:      Head: Normocephalic and atraumatic. Eyes:      Conjunctiva/sclera: Conjunctivae normal.      Pupils: Pupils are equal, round, and reactive to light. Neck:      Thyroid: No thyromegaly. Cardiovascular:      Rate and Rhythm: Normal rate and regular rhythm.    Pulmonary:      Effort: Pulmonary effort is normal. No respiratory distress. Breath sounds: Normal breath sounds. Abdominal:      General: Bowel sounds are normal.      Palpations: Abdomen is soft. Musculoskeletal:         General: Normal range of motion. Cervical back: Normal range of motion and neck supple. Skin:     General: Skin is warm and dry. Findings: No rash. Neurological:      Mental Status: He is alert and oriented to person, place, and time. Motor: No abnormal muscle tone. Psychiatric:         Behavior: Behavior normal.         The history was obtained from the review of the chart and from the patient. Lab Results:    Most recent Alc is  Lab Results   Component Value Date    HGBA1C 6.5 12/01/2023           No components found for: "HA1C"  No components found for: "GLU"    Lab Results   Component Value Date    CREATININE 1.45 (H) 06/21/2023    CREATININE 1.48 (H) 10/13/2022    CREATININE 2.32 (H) 07/15/2021    BUN 23 06/21/2023    K 4.2 06/21/2023     06/21/2023    CO2 25 06/21/2023     eGFR   Date Value Ref Range Status   06/21/2023 52 (L) >59 mL/min/1.73 Final     No components found for: "MALBCRER"    Lab Results   Component Value Date    HDL 44 06/21/2023    TRIG 127 06/21/2023    CHOLHDL 3.3 06/21/2023       Lab Results   Component Value Date    ALT 17 06/21/2023    AST 13 06/21/2023       Lab Results   Component Value Date    TSH 2.260 06/21/2023    FREET4 1.16 10/13/2022             No future appointments. Portions of the record may have been created with voice recognition software. Occasional wrong word or "sound a like" substitutions may have occurred due to the inherent limitations of voice recognition software. Read the chart carefully and recognize, using context, where substitutions have occurred. 36.2

## 2023-12-04 ENCOUNTER — APPOINTMENT (OUTPATIENT)
Dept: OPHTHALMOLOGY | Facility: CLINIC | Age: 68
End: 2023-12-04

## 2024-01-01 ENCOUNTER — INPATIENT (INPATIENT)
Facility: HOSPITAL | Age: 69
LOS: 18 days | DRG: 66 | End: 2025-01-09
Attending: PSYCHIATRY & NEUROLOGY | Admitting: NEUROLOGICAL SURGERY
Payer: MEDICARE

## 2024-01-01 VITALS — HEART RATE: 50 BPM | OXYGEN SATURATION: 100 %

## 2024-01-01 DIAGNOSIS — I62.9 NONTRAUMATIC INTRACRANIAL HEMORRHAGE, UNSPECIFIED: ICD-10-CM

## 2024-01-01 DIAGNOSIS — R09.89 OTHER SPECIFIED SYMPTOMS AND SIGNS INVOLVING THE CIRCULATORY AND RESPIRATORY SYSTEMS: ICD-10-CM

## 2024-01-01 DIAGNOSIS — Z96.642 PRESENCE OF LEFT ARTIFICIAL HIP JOINT: Chronic | ICD-10-CM

## 2024-01-01 LAB
A1C WITH ESTIMATED AVERAGE GLUCOSE RESULT: 5.8 % — HIGH (ref 4–5.6)
A1C WITH ESTIMATED AVERAGE GLUCOSE RESULT: 6 % — HIGH (ref 4–5.6)
ALBUMIN SERPL ELPH-MCNC: 2.7 G/DL — LOW (ref 3.3–5.2)
ALBUMIN SERPL ELPH-MCNC: 2.7 G/DL — LOW (ref 3.3–5.2)
ALBUMIN SERPL ELPH-MCNC: 2.8 G/DL — LOW (ref 3.3–5.2)
ALBUMIN SERPL ELPH-MCNC: 3.7 G/DL — SIGNIFICANT CHANGE UP (ref 3.3–5.2)
ALP SERPL-CCNC: 49 U/L — SIGNIFICANT CHANGE UP (ref 40–120)
ALP SERPL-CCNC: 50 U/L — SIGNIFICANT CHANGE UP (ref 40–120)
ALP SERPL-CCNC: 52 U/L — SIGNIFICANT CHANGE UP (ref 40–120)
ALP SERPL-CCNC: 63 U/L — SIGNIFICANT CHANGE UP (ref 40–120)
ALT FLD-CCNC: 17 U/L — SIGNIFICANT CHANGE UP
ALT FLD-CCNC: 22 U/L — SIGNIFICANT CHANGE UP
ALT FLD-CCNC: 55 U/L — HIGH
ALT FLD-CCNC: 56 U/L — HIGH
ANION GAP SERPL CALC-SCNC: 10 MMOL/L — SIGNIFICANT CHANGE UP (ref 5–17)
ANION GAP SERPL CALC-SCNC: 11 MMOL/L — SIGNIFICANT CHANGE UP (ref 5–17)
ANION GAP SERPL CALC-SCNC: 12 MMOL/L — SIGNIFICANT CHANGE UP (ref 5–17)
ANION GAP SERPL CALC-SCNC: 13 MMOL/L — SIGNIFICANT CHANGE UP (ref 5–17)
ANION GAP SERPL CALC-SCNC: 14 MMOL/L — SIGNIFICANT CHANGE UP (ref 5–17)
ANION GAP SERPL CALC-SCNC: 9 MMOL/L — SIGNIFICANT CHANGE UP (ref 5–17)
APPEARANCE UR: ABNORMAL
APPEARANCE UR: ABNORMAL
APTT BLD: 26.1 SEC — SIGNIFICANT CHANGE UP (ref 24.5–35.6)
APTT BLD: 28.9 SEC — SIGNIFICANT CHANGE UP (ref 24.5–35.6)
APTT BLD: 30.8 SEC — SIGNIFICANT CHANGE UP (ref 24.5–35.6)
AST SERPL-CCNC: 18 U/L — SIGNIFICANT CHANGE UP
AST SERPL-CCNC: 32 U/L — SIGNIFICANT CHANGE UP
AST SERPL-CCNC: 33 U/L — SIGNIFICANT CHANGE UP
AST SERPL-CCNC: 34 U/L — SIGNIFICANT CHANGE UP
BACTERIA # UR AUTO: NEGATIVE /HPF — SIGNIFICANT CHANGE UP
BACTERIA # UR AUTO: NEGATIVE /HPF — SIGNIFICANT CHANGE UP
BASE EXCESS BLDA CALC-SCNC: -0.5 MMOL/L — SIGNIFICANT CHANGE UP (ref -2–3)
BASE EXCESS BLDA CALC-SCNC: 0 MMOL/L — SIGNIFICANT CHANGE UP (ref -2–3)
BASE EXCESS BLDA CALC-SCNC: 4.1 MMOL/L — HIGH (ref -2–3)
BILIRUB DIRECT SERPL-MCNC: 0.1 MG/DL — SIGNIFICANT CHANGE UP (ref 0–0.3)
BILIRUB DIRECT SERPL-MCNC: 0.1 MG/DL — SIGNIFICANT CHANGE UP (ref 0–0.3)
BILIRUB INDIRECT FLD-MCNC: 0.2 MG/DL — SIGNIFICANT CHANGE UP (ref 0.2–1)
BILIRUB INDIRECT FLD-MCNC: 0.3 MG/DL — SIGNIFICANT CHANGE UP (ref 0.2–1)
BILIRUB SERPL-MCNC: 0.3 MG/DL — LOW (ref 0.4–2)
BILIRUB SERPL-MCNC: 0.3 MG/DL — LOW (ref 0.4–2)
BILIRUB SERPL-MCNC: 0.4 MG/DL — SIGNIFICANT CHANGE UP (ref 0.4–2)
BILIRUB SERPL-MCNC: 0.7 MG/DL — SIGNIFICANT CHANGE UP (ref 0.4–2)
BILIRUB UR-MCNC: NEGATIVE — SIGNIFICANT CHANGE UP
BILIRUB UR-MCNC: NEGATIVE — SIGNIFICANT CHANGE UP
BLD GP AB SCN SERPL QL: SIGNIFICANT CHANGE UP
BLD GP AB SCN SERPL QL: SIGNIFICANT CHANGE UP
BLOOD GAS COMMENTS ARTERIAL: SIGNIFICANT CHANGE UP
BUN SERPL-MCNC: 20.7 MG/DL — HIGH (ref 8–20)
BUN SERPL-MCNC: 21.5 MG/DL — HIGH (ref 8–20)
BUN SERPL-MCNC: 24 MG/DL — HIGH (ref 8–20)
BUN SERPL-MCNC: 25.6 MG/DL — HIGH (ref 8–20)
BUN SERPL-MCNC: 27.3 MG/DL — HIGH (ref 8–20)
BUN SERPL-MCNC: 27.9 MG/DL — HIGH (ref 8–20)
BUN SERPL-MCNC: 28 MG/DL — HIGH (ref 8–20)
BUN SERPL-MCNC: 30.2 MG/DL — HIGH (ref 8–20)
BUN SERPL-MCNC: 30.9 MG/DL — HIGH (ref 8–20)
BUN SERPL-MCNC: 34.3 MG/DL — HIGH (ref 8–20)
BUN SERPL-MCNC: 37.8 MG/DL — HIGH (ref 8–20)
BUN SERPL-MCNC: 39.4 MG/DL — HIGH (ref 8–20)
BUN SERPL-MCNC: 41 MG/DL — HIGH (ref 8–20)
BUN SERPL-MCNC: 41.5 MG/DL — HIGH (ref 8–20)
BUN SERPL-MCNC: 41.5 MG/DL — HIGH (ref 8–20)
BUN SERPL-MCNC: 42.6 MG/DL — HIGH (ref 8–20)
BUN SERPL-MCNC: 43.3 MG/DL — HIGH (ref 8–20)
BUN SERPL-MCNC: 43.5 MG/DL — HIGH (ref 8–20)
BUN SERPL-MCNC: 44.3 MG/DL — HIGH (ref 8–20)
BUN SERPL-MCNC: 44.9 MG/DL — HIGH (ref 8–20)
BUN SERPL-MCNC: 45.2 MG/DL — HIGH (ref 8–20)
CALCIUM SERPL-MCNC: 7.9 MG/DL — LOW (ref 8.4–10.5)
CALCIUM SERPL-MCNC: 8 MG/DL — LOW (ref 8.4–10.5)
CALCIUM SERPL-MCNC: 8.1 MG/DL — LOW (ref 8.4–10.5)
CALCIUM SERPL-MCNC: 8.2 MG/DL — LOW (ref 8.4–10.5)
CALCIUM SERPL-MCNC: 8.3 MG/DL — LOW (ref 8.4–10.5)
CALCIUM SERPL-MCNC: 8.3 MG/DL — LOW (ref 8.4–10.5)
CALCIUM SERPL-MCNC: 8.4 MG/DL — SIGNIFICANT CHANGE UP (ref 8.4–10.5)
CALCIUM SERPL-MCNC: 8.5 MG/DL — SIGNIFICANT CHANGE UP (ref 8.4–10.5)
CALCIUM SERPL-MCNC: 8.5 MG/DL — SIGNIFICANT CHANGE UP (ref 8.4–10.5)
CALCIUM SERPL-MCNC: 8.6 MG/DL — SIGNIFICANT CHANGE UP (ref 8.4–10.5)
CALCIUM SERPL-MCNC: 8.7 MG/DL — SIGNIFICANT CHANGE UP (ref 8.4–10.5)
CALCIUM SERPL-MCNC: 8.8 MG/DL — SIGNIFICANT CHANGE UP (ref 8.4–10.5)
CALCIUM SERPL-MCNC: 8.8 MG/DL — SIGNIFICANT CHANGE UP (ref 8.4–10.5)
CAST: 6 /LPF — HIGH (ref 0–4)
CHLORIDE SERPL-SCNC: 104 MMOL/L — SIGNIFICANT CHANGE UP (ref 96–108)
CHLORIDE SERPL-SCNC: 104 MMOL/L — SIGNIFICANT CHANGE UP (ref 96–108)
CHLORIDE SERPL-SCNC: 108 MMOL/L — SIGNIFICANT CHANGE UP (ref 96–108)
CHLORIDE SERPL-SCNC: 109 MMOL/L — HIGH (ref 96–108)
CHLORIDE SERPL-SCNC: 110 MMOL/L — HIGH (ref 96–108)
CHLORIDE SERPL-SCNC: 111 MMOL/L — HIGH (ref 96–108)
CHLORIDE SERPL-SCNC: 111 MMOL/L — HIGH (ref 96–108)
CHLORIDE SERPL-SCNC: 112 MMOL/L — HIGH (ref 96–108)
CHLORIDE SERPL-SCNC: 113 MMOL/L — HIGH (ref 96–108)
CHLORIDE SERPL-SCNC: 113 MMOL/L — HIGH (ref 96–108)
CHLORIDE SERPL-SCNC: 114 MMOL/L — HIGH (ref 96–108)
CHLORIDE SERPL-SCNC: 115 MMOL/L — HIGH (ref 96–108)
CHLORIDE SERPL-SCNC: 115 MMOL/L — HIGH (ref 96–108)
CHLORIDE SERPL-SCNC: 116 MMOL/L — HIGH (ref 96–108)
CHLORIDE SERPL-SCNC: 118 MMOL/L — HIGH (ref 96–108)
CHLORIDE SERPL-SCNC: 120 MMOL/L — HIGH (ref 96–108)
CHOLEST SERPL-MCNC: 159 MG/DL — SIGNIFICANT CHANGE UP
CO2 SERPL-SCNC: 19 MMOL/L — LOW (ref 22–29)
CO2 SERPL-SCNC: 20 MMOL/L — LOW (ref 22–29)
CO2 SERPL-SCNC: 21 MMOL/L — LOW (ref 22–29)
CO2 SERPL-SCNC: 22 MMOL/L — SIGNIFICANT CHANGE UP (ref 22–29)
CO2 SERPL-SCNC: 23 MMOL/L — SIGNIFICANT CHANGE UP (ref 22–29)
CO2 SERPL-SCNC: 23 MMOL/L — SIGNIFICANT CHANGE UP (ref 22–29)
CO2 SERPL-SCNC: 24 MMOL/L — SIGNIFICANT CHANGE UP (ref 22–29)
CO2 SERPL-SCNC: 25 MMOL/L — SIGNIFICANT CHANGE UP (ref 22–29)
CO2 SERPL-SCNC: 25 MMOL/L — SIGNIFICANT CHANGE UP (ref 22–29)
CO2 SERPL-SCNC: 26 MMOL/L — SIGNIFICANT CHANGE UP (ref 22–29)
COLOR SPEC: SIGNIFICANT CHANGE UP
COLOR SPEC: YELLOW — SIGNIFICANT CHANGE UP
CREAT SERPL-MCNC: 0.95 MG/DL — SIGNIFICANT CHANGE UP (ref 0.5–1.3)
CREAT SERPL-MCNC: 1.05 MG/DL — SIGNIFICANT CHANGE UP (ref 0.5–1.3)
CREAT SERPL-MCNC: 1.07 MG/DL — SIGNIFICANT CHANGE UP (ref 0.5–1.3)
CREAT SERPL-MCNC: 1.07 MG/DL — SIGNIFICANT CHANGE UP (ref 0.5–1.3)
CREAT SERPL-MCNC: 1.13 MG/DL — SIGNIFICANT CHANGE UP (ref 0.5–1.3)
CREAT SERPL-MCNC: 1.18 MG/DL — SIGNIFICANT CHANGE UP (ref 0.5–1.3)
CREAT SERPL-MCNC: 1.18 MG/DL — SIGNIFICANT CHANGE UP (ref 0.5–1.3)
CREAT SERPL-MCNC: 1.26 MG/DL — SIGNIFICANT CHANGE UP (ref 0.5–1.3)
CREAT SERPL-MCNC: 1.27 MG/DL — SIGNIFICANT CHANGE UP (ref 0.5–1.3)
CREAT SERPL-MCNC: 1.28 MG/DL — SIGNIFICANT CHANGE UP (ref 0.5–1.3)
CREAT SERPL-MCNC: 1.29 MG/DL — SIGNIFICANT CHANGE UP (ref 0.5–1.3)
CREAT SERPL-MCNC: 1.29 MG/DL — SIGNIFICANT CHANGE UP (ref 0.5–1.3)
CREAT SERPL-MCNC: 1.35 MG/DL — HIGH (ref 0.5–1.3)
CREAT SERPL-MCNC: 1.37 MG/DL — HIGH (ref 0.5–1.3)
CREAT SERPL-MCNC: 1.42 MG/DL — HIGH (ref 0.5–1.3)
CREAT SERPL-MCNC: 1.44 MG/DL — HIGH (ref 0.5–1.3)
CREAT SERPL-MCNC: 1.44 MG/DL — HIGH (ref 0.5–1.3)
CREAT SERPL-MCNC: 1.45 MG/DL — HIGH (ref 0.5–1.3)
CREAT SERPL-MCNC: 1.52 MG/DL — HIGH (ref 0.5–1.3)
CREAT SERPL-MCNC: 1.58 MG/DL — HIGH (ref 0.5–1.3)
CREAT SERPL-MCNC: 1.73 MG/DL — HIGH (ref 0.5–1.3)
CULTURE RESULTS: ABNORMAL
CULTURE RESULTS: ABNORMAL
CULTURE RESULTS: NO GROWTH — SIGNIFICANT CHANGE UP
CULTURE RESULTS: SIGNIFICANT CHANGE UP
DIFF PNL FLD: ABNORMAL
DIFF PNL FLD: ABNORMAL
EGFR: 42 ML/MIN/1.73M2 — LOW
EGFR: 47 ML/MIN/1.73M2 — LOW
EGFR: 49 ML/MIN/1.73M2 — LOW
EGFR: 52 ML/MIN/1.73M2 — LOW
EGFR: 53 ML/MIN/1.73M2 — LOW
EGFR: 55 ML/MIN/1.73M2 — LOW
EGFR: 56 ML/MIN/1.73M2 — LOW
EGFR: 60 ML/MIN/1.73M2 — SIGNIFICANT CHANGE UP
EGFR: 61 ML/MIN/1.73M2 — SIGNIFICANT CHANGE UP
EGFR: 61 ML/MIN/1.73M2 — SIGNIFICANT CHANGE UP
EGFR: 66 ML/MIN/1.73M2 — SIGNIFICANT CHANGE UP
EGFR: 66 ML/MIN/1.73M2 — SIGNIFICANT CHANGE UP
EGFR: 70 ML/MIN/1.73M2 — SIGNIFICANT CHANGE UP
EGFR: 75 ML/MIN/1.73M2 — SIGNIFICANT CHANGE UP
EGFR: 75 ML/MIN/1.73M2 — SIGNIFICANT CHANGE UP
EGFR: 76 ML/MIN/1.73M2 — SIGNIFICANT CHANGE UP
EGFR: 86 ML/MIN/1.73M2 — SIGNIFICANT CHANGE UP
ESTIMATED AVERAGE GLUCOSE: 120 MG/DL — HIGH (ref 68–114)
ESTIMATED AVERAGE GLUCOSE: 126 MG/DL — HIGH (ref 68–114)
GAS PNL BLDA: SIGNIFICANT CHANGE UP
GLUCOSE BLDC GLUCOMTR-MCNC: 109 MG/DL — HIGH (ref 70–99)
GLUCOSE BLDC GLUCOMTR-MCNC: 117 MG/DL — HIGH (ref 70–99)
GLUCOSE BLDC GLUCOMTR-MCNC: 131 MG/DL — HIGH (ref 70–99)
GLUCOSE BLDC GLUCOMTR-MCNC: 133 MG/DL — HIGH (ref 70–99)
GLUCOSE BLDC GLUCOMTR-MCNC: 134 MG/DL — HIGH (ref 70–99)
GLUCOSE BLDC GLUCOMTR-MCNC: 134 MG/DL — HIGH (ref 70–99)
GLUCOSE BLDC GLUCOMTR-MCNC: 136 MG/DL — HIGH (ref 70–99)
GLUCOSE BLDC GLUCOMTR-MCNC: 141 MG/DL — HIGH (ref 70–99)
GLUCOSE BLDC GLUCOMTR-MCNC: 142 MG/DL — HIGH (ref 70–99)
GLUCOSE BLDC GLUCOMTR-MCNC: 143 MG/DL — HIGH (ref 70–99)
GLUCOSE BLDC GLUCOMTR-MCNC: 146 MG/DL — HIGH (ref 70–99)
GLUCOSE BLDC GLUCOMTR-MCNC: 147 MG/DL — HIGH (ref 70–99)
GLUCOSE BLDC GLUCOMTR-MCNC: 150 MG/DL — HIGH (ref 70–99)
GLUCOSE BLDC GLUCOMTR-MCNC: 153 MG/DL — HIGH (ref 70–99)
GLUCOSE BLDC GLUCOMTR-MCNC: 156 MG/DL — HIGH (ref 70–99)
GLUCOSE BLDC GLUCOMTR-MCNC: 158 MG/DL — HIGH (ref 70–99)
GLUCOSE BLDC GLUCOMTR-MCNC: 158 MG/DL — HIGH (ref 70–99)
GLUCOSE BLDC GLUCOMTR-MCNC: 162 MG/DL — HIGH (ref 70–99)
GLUCOSE BLDC GLUCOMTR-MCNC: 164 MG/DL — HIGH (ref 70–99)
GLUCOSE BLDC GLUCOMTR-MCNC: 166 MG/DL — HIGH (ref 70–99)
GLUCOSE BLDC GLUCOMTR-MCNC: 166 MG/DL — HIGH (ref 70–99)
GLUCOSE BLDC GLUCOMTR-MCNC: 168 MG/DL — HIGH (ref 70–99)
GLUCOSE BLDC GLUCOMTR-MCNC: 169 MG/DL — HIGH (ref 70–99)
GLUCOSE BLDC GLUCOMTR-MCNC: 170 MG/DL — HIGH (ref 70–99)
GLUCOSE BLDC GLUCOMTR-MCNC: 172 MG/DL — HIGH (ref 70–99)
GLUCOSE BLDC GLUCOMTR-MCNC: 173 MG/DL — HIGH (ref 70–99)
GLUCOSE BLDC GLUCOMTR-MCNC: 174 MG/DL — HIGH (ref 70–99)
GLUCOSE BLDC GLUCOMTR-MCNC: 177 MG/DL — HIGH (ref 70–99)
GLUCOSE BLDC GLUCOMTR-MCNC: 179 MG/DL — HIGH (ref 70–99)
GLUCOSE BLDC GLUCOMTR-MCNC: 182 MG/DL — HIGH (ref 70–99)
GLUCOSE BLDC GLUCOMTR-MCNC: 185 MG/DL — HIGH (ref 70–99)
GLUCOSE BLDC GLUCOMTR-MCNC: 189 MG/DL — HIGH (ref 70–99)
GLUCOSE BLDC GLUCOMTR-MCNC: 190 MG/DL — HIGH (ref 70–99)
GLUCOSE BLDC GLUCOMTR-MCNC: 193 MG/DL — HIGH (ref 70–99)
GLUCOSE BLDC GLUCOMTR-MCNC: 202 MG/DL — HIGH (ref 70–99)
GLUCOSE BLDC GLUCOMTR-MCNC: 207 MG/DL — HIGH (ref 70–99)
GLUCOSE BLDC GLUCOMTR-MCNC: 215 MG/DL — HIGH (ref 70–99)
GLUCOSE SERPL-MCNC: 142 MG/DL — HIGH (ref 70–99)
GLUCOSE SERPL-MCNC: 147 MG/DL — HIGH (ref 70–99)
GLUCOSE SERPL-MCNC: 154 MG/DL — HIGH (ref 70–99)
GLUCOSE SERPL-MCNC: 164 MG/DL — HIGH (ref 70–99)
GLUCOSE SERPL-MCNC: 166 MG/DL — HIGH (ref 70–99)
GLUCOSE SERPL-MCNC: 170 MG/DL — HIGH (ref 70–99)
GLUCOSE SERPL-MCNC: 173 MG/DL — HIGH (ref 70–99)
GLUCOSE SERPL-MCNC: 173 MG/DL — HIGH (ref 70–99)
GLUCOSE SERPL-MCNC: 174 MG/DL — HIGH (ref 70–99)
GLUCOSE SERPL-MCNC: 182 MG/DL — HIGH (ref 70–99)
GLUCOSE SERPL-MCNC: 183 MG/DL — HIGH (ref 70–99)
GLUCOSE SERPL-MCNC: 191 MG/DL — HIGH (ref 70–99)
GLUCOSE SERPL-MCNC: 193 MG/DL — HIGH (ref 70–99)
GLUCOSE SERPL-MCNC: 199 MG/DL — HIGH (ref 70–99)
GLUCOSE SERPL-MCNC: 200 MG/DL — HIGH (ref 70–99)
GLUCOSE SERPL-MCNC: 205 MG/DL — HIGH (ref 70–99)
GLUCOSE SERPL-MCNC: 216 MG/DL — HIGH (ref 70–99)
GLUCOSE SERPL-MCNC: 219 MG/DL — HIGH (ref 70–99)
GLUCOSE SERPL-MCNC: 230 MG/DL — HIGH (ref 70–99)
GLUCOSE UR QL: NEGATIVE MG/DL — SIGNIFICANT CHANGE UP
GLUCOSE UR QL: NEGATIVE MG/DL — SIGNIFICANT CHANGE UP
GRAM STN FLD: ABNORMAL
GRAM STN FLD: ABNORMAL
HCO3 BLDA-SCNC: 25 MMOL/L — SIGNIFICANT CHANGE UP (ref 21–28)
HCO3 BLDA-SCNC: 25 MMOL/L — SIGNIFICANT CHANGE UP (ref 21–28)
HCO3 BLDA-SCNC: 27 MMOL/L — SIGNIFICANT CHANGE UP (ref 21–28)
HCT VFR BLD CALC: 41.6 % — SIGNIFICANT CHANGE UP (ref 39–50)
HCT VFR BLD CALC: 42.9 % — SIGNIFICANT CHANGE UP (ref 39–50)
HCT VFR BLD CALC: 43.4 % — SIGNIFICANT CHANGE UP (ref 39–50)
HCT VFR BLD CALC: 43.4 % — SIGNIFICANT CHANGE UP (ref 39–50)
HCT VFR BLD CALC: 43.8 % — SIGNIFICANT CHANGE UP (ref 39–50)
HCT VFR BLD CALC: 44 % — SIGNIFICANT CHANGE UP (ref 39–50)
HCT VFR BLD CALC: 44 % — SIGNIFICANT CHANGE UP (ref 39–50)
HCT VFR BLD CALC: 44.3 % — SIGNIFICANT CHANGE UP (ref 39–50)
HCT VFR BLD CALC: 44.9 % — SIGNIFICANT CHANGE UP (ref 39–50)
HCT VFR BLD CALC: 45.3 % — SIGNIFICANT CHANGE UP (ref 39–50)
HCT VFR BLD CALC: 48.4 % — SIGNIFICANT CHANGE UP (ref 39–50)
HCT VFR BLD CALC: 48.8 % — SIGNIFICANT CHANGE UP (ref 39–50)
HDLC SERPL-MCNC: 37 MG/DL — LOW
HGB BLD-MCNC: 13.1 G/DL — SIGNIFICANT CHANGE UP (ref 13–17)
HGB BLD-MCNC: 13.5 G/DL — SIGNIFICANT CHANGE UP (ref 13–17)
HGB BLD-MCNC: 13.8 G/DL — SIGNIFICANT CHANGE UP (ref 13–17)
HGB BLD-MCNC: 14 G/DL — SIGNIFICANT CHANGE UP (ref 13–17)
HGB BLD-MCNC: 14.1 G/DL — SIGNIFICANT CHANGE UP (ref 13–17)
HGB BLD-MCNC: 14.4 G/DL — SIGNIFICANT CHANGE UP (ref 13–17)
HGB BLD-MCNC: 15 G/DL — SIGNIFICANT CHANGE UP (ref 13–17)
HGB BLD-MCNC: 16.6 G/DL — SIGNIFICANT CHANGE UP (ref 13–17)
HGB BLD-MCNC: 16.7 G/DL — SIGNIFICANT CHANGE UP (ref 13–17)
HOROWITZ INDEX BLDA+IHG-RTO: 40 — SIGNIFICANT CHANGE UP
HOROWITZ INDEX BLDA+IHG-RTO: SIGNIFICANT CHANGE UP
INR BLD: 1 RATIO — SIGNIFICANT CHANGE UP (ref 0.85–1.16)
INR BLD: 1.04 RATIO — SIGNIFICANT CHANGE UP (ref 0.85–1.16)
INR BLD: 1.14 RATIO — SIGNIFICANT CHANGE UP (ref 0.85–1.16)
KETONES UR-MCNC: ABNORMAL MG/DL
KETONES UR-MCNC: NEGATIVE MG/DL — SIGNIFICANT CHANGE UP
LEUKOCYTE ESTERASE UR-ACNC: ABNORMAL
LEUKOCYTE ESTERASE UR-ACNC: NEGATIVE — SIGNIFICANT CHANGE UP
LIPID PNL WITH DIRECT LDL SERPL: 88 MG/DL — SIGNIFICANT CHANGE UP
MAGNESIUM SERPL-MCNC: 1.9 MG/DL — SIGNIFICANT CHANGE UP (ref 1.6–2.6)
MAGNESIUM SERPL-MCNC: 2.3 MG/DL — SIGNIFICANT CHANGE UP (ref 1.6–2.6)
MAGNESIUM SERPL-MCNC: 2.5 MG/DL — SIGNIFICANT CHANGE UP (ref 1.6–2.6)
MAGNESIUM SERPL-MCNC: 2.6 MG/DL — SIGNIFICANT CHANGE UP (ref 1.6–2.6)
MAGNESIUM SERPL-MCNC: 2.6 MG/DL — SIGNIFICANT CHANGE UP (ref 1.8–2.6)
MAGNESIUM SERPL-MCNC: 2.6 MG/DL — SIGNIFICANT CHANGE UP (ref 1.8–2.6)
MAGNESIUM SERPL-MCNC: 2.7 MG/DL — HIGH (ref 1.6–2.6)
MAGNESIUM SERPL-MCNC: 2.8 MG/DL — HIGH (ref 1.6–2.6)
MAGNESIUM SERPL-MCNC: 2.8 MG/DL — HIGH (ref 1.8–2.6)
MCHC RBC-ENTMCNC: 30.3 PG — SIGNIFICANT CHANGE UP (ref 27–34)
MCHC RBC-ENTMCNC: 30.5 PG — SIGNIFICANT CHANGE UP (ref 27–34)
MCHC RBC-ENTMCNC: 30.6 PG — SIGNIFICANT CHANGE UP (ref 27–34)
MCHC RBC-ENTMCNC: 30.6 PG — SIGNIFICANT CHANGE UP (ref 27–34)
MCHC RBC-ENTMCNC: 30.7 PG — SIGNIFICANT CHANGE UP (ref 27–34)
MCHC RBC-ENTMCNC: 30.7 PG — SIGNIFICANT CHANGE UP (ref 27–34)
MCHC RBC-ENTMCNC: 30.8 PG — SIGNIFICANT CHANGE UP (ref 27–34)
MCHC RBC-ENTMCNC: 30.8 PG — SIGNIFICANT CHANGE UP (ref 27–34)
MCHC RBC-ENTMCNC: 30.9 PG — SIGNIFICANT CHANGE UP (ref 27–34)
MCHC RBC-ENTMCNC: 31 PG — SIGNIFICANT CHANGE UP (ref 27–34)
MCHC RBC-ENTMCNC: 31.1 G/DL — LOW (ref 32–36)
MCHC RBC-ENTMCNC: 31.2 G/DL — LOW (ref 32–36)
MCHC RBC-ENTMCNC: 31.2 PG — SIGNIFICANT CHANGE UP (ref 27–34)
MCHC RBC-ENTMCNC: 31.4 G/DL — LOW (ref 32–36)
MCHC RBC-ENTMCNC: 31.4 G/DL — LOW (ref 32–36)
MCHC RBC-ENTMCNC: 31.5 G/DL — LOW (ref 32–36)
MCHC RBC-ENTMCNC: 31.6 PG — SIGNIFICANT CHANGE UP (ref 27–34)
MCHC RBC-ENTMCNC: 31.8 G/DL — LOW (ref 32–36)
MCHC RBC-ENTMCNC: 32 G/DL — SIGNIFICANT CHANGE UP (ref 32–36)
MCHC RBC-ENTMCNC: 32.2 G/DL — SIGNIFICANT CHANGE UP (ref 32–36)
MCHC RBC-ENTMCNC: 32.7 G/DL — SIGNIFICANT CHANGE UP (ref 32–36)
MCHC RBC-ENTMCNC: 33.1 G/DL — SIGNIFICANT CHANGE UP (ref 32–36)
MCHC RBC-ENTMCNC: 34 G/DL — SIGNIFICANT CHANGE UP (ref 32–36)
MCHC RBC-ENTMCNC: 34.5 G/DL — SIGNIFICANT CHANGE UP (ref 32–36)
MCV RBC AUTO: 89.8 FL — SIGNIFICANT CHANGE UP (ref 80–100)
MCV RBC AUTO: 90 FL — SIGNIFICANT CHANGE UP (ref 80–100)
MCV RBC AUTO: 93 FL — SIGNIFICANT CHANGE UP (ref 80–100)
MCV RBC AUTO: 95.4 FL — SIGNIFICANT CHANGE UP (ref 80–100)
MCV RBC AUTO: 95.6 FL — SIGNIFICANT CHANGE UP (ref 80–100)
MCV RBC AUTO: 96.4 FL — SIGNIFICANT CHANGE UP (ref 80–100)
MCV RBC AUTO: 97.2 FL — SIGNIFICANT CHANGE UP (ref 80–100)
MCV RBC AUTO: 97.3 FL — SIGNIFICANT CHANGE UP (ref 80–100)
MCV RBC AUTO: 97.7 FL — SIGNIFICANT CHANGE UP (ref 80–100)
MCV RBC AUTO: 98.2 FL — SIGNIFICANT CHANGE UP (ref 80–100)
MCV RBC AUTO: 98.4 FL — SIGNIFICANT CHANGE UP (ref 80–100)
MCV RBC AUTO: 98.7 FL — SIGNIFICANT CHANGE UP (ref 80–100)
MRSA PCR RESULT.: SIGNIFICANT CHANGE UP
NITRITE UR-MCNC: NEGATIVE — SIGNIFICANT CHANGE UP
NITRITE UR-MCNC: NEGATIVE — SIGNIFICANT CHANGE UP
NON HDL CHOLESTEROL: 122 MG/DL — SIGNIFICANT CHANGE UP
PCO2 BLDA: 36 MMHG — SIGNIFICANT CHANGE UP (ref 35–48)
PCO2 BLDA: 40 MMHG — SIGNIFICANT CHANGE UP (ref 35–48)
PCO2 BLDA: 44 MMHG — SIGNIFICANT CHANGE UP (ref 35–48)
PH BLDA: 7.36 — SIGNIFICANT CHANGE UP (ref 7.35–7.45)
PH BLDA: 7.4 — SIGNIFICANT CHANGE UP (ref 7.35–7.45)
PH BLDA: 7.49 — HIGH (ref 7.35–7.45)
PH UR: 5.5 — SIGNIFICANT CHANGE UP (ref 5–8)
PH UR: 6.5 — SIGNIFICANT CHANGE UP (ref 5–8)
PHOSPHATE SERPL-MCNC: 2.1 MG/DL — LOW (ref 2.4–4.7)
PHOSPHATE SERPL-MCNC: 2.5 MG/DL — SIGNIFICANT CHANGE UP (ref 2.4–4.7)
PHOSPHATE SERPL-MCNC: 2.5 MG/DL — SIGNIFICANT CHANGE UP (ref 2.4–4.7)
PHOSPHATE SERPL-MCNC: 3 MG/DL — SIGNIFICANT CHANGE UP (ref 2.4–4.7)
PHOSPHATE SERPL-MCNC: 3.1 MG/DL — SIGNIFICANT CHANGE UP (ref 2.4–4.7)
PHOSPHATE SERPL-MCNC: 3.1 MG/DL — SIGNIFICANT CHANGE UP (ref 2.4–4.7)
PHOSPHATE SERPL-MCNC: 3.3 MG/DL — SIGNIFICANT CHANGE UP (ref 2.4–4.7)
PHOSPHATE SERPL-MCNC: 3.9 MG/DL — SIGNIFICANT CHANGE UP (ref 2.4–4.7)
PHOSPHATE SERPL-MCNC: 4.1 MG/DL — SIGNIFICANT CHANGE UP (ref 2.4–4.7)
PHOSPHATE SERPL-MCNC: 4.2 MG/DL — SIGNIFICANT CHANGE UP (ref 2.4–4.7)
PHOSPHATE SERPL-MCNC: 4.3 MG/DL — SIGNIFICANT CHANGE UP (ref 2.4–4.7)
PLATELET # BLD AUTO: 130 K/UL — LOW (ref 150–400)
PLATELET # BLD AUTO: 133 K/UL — LOW (ref 150–400)
PLATELET # BLD AUTO: 151 K/UL — SIGNIFICANT CHANGE UP (ref 150–400)
PLATELET # BLD AUTO: 153 K/UL — SIGNIFICANT CHANGE UP (ref 150–400)
PLATELET # BLD AUTO: 155 K/UL — SIGNIFICANT CHANGE UP (ref 150–400)
PLATELET # BLD AUTO: 177 K/UL — SIGNIFICANT CHANGE UP (ref 150–400)
PLATELET # BLD AUTO: 186 K/UL — SIGNIFICANT CHANGE UP (ref 150–400)
PLATELET # BLD AUTO: 186 K/UL — SIGNIFICANT CHANGE UP (ref 150–400)
PLATELET # BLD AUTO: 194 K/UL — SIGNIFICANT CHANGE UP (ref 150–400)
PLATELET # BLD AUTO: 195 K/UL — SIGNIFICANT CHANGE UP (ref 150–400)
PLATELET # BLD AUTO: 196 K/UL — SIGNIFICANT CHANGE UP (ref 150–400)
PLATELET # BLD AUTO: 202 K/UL — SIGNIFICANT CHANGE UP (ref 150–400)
PO2 BLDA: 138 MMHG — HIGH (ref 83–108)
PO2 BLDA: 70 MMHG — LOW (ref 83–108)
PO2 BLDA: 80 MMHG — LOW (ref 83–108)
POTASSIUM SERPL-MCNC: 3.7 MMOL/L — SIGNIFICANT CHANGE UP (ref 3.5–5.3)
POTASSIUM SERPL-MCNC: 4 MMOL/L — SIGNIFICANT CHANGE UP (ref 3.5–5.3)
POTASSIUM SERPL-MCNC: 4 MMOL/L — SIGNIFICANT CHANGE UP (ref 3.5–5.3)
POTASSIUM SERPL-MCNC: 4.1 MMOL/L — SIGNIFICANT CHANGE UP (ref 3.5–5.3)
POTASSIUM SERPL-MCNC: 4.2 MMOL/L — SIGNIFICANT CHANGE UP (ref 3.5–5.3)
POTASSIUM SERPL-MCNC: 4.2 MMOL/L — SIGNIFICANT CHANGE UP (ref 3.5–5.3)
POTASSIUM SERPL-MCNC: 4.3 MMOL/L — SIGNIFICANT CHANGE UP (ref 3.5–5.3)
POTASSIUM SERPL-MCNC: 4.4 MMOL/L — SIGNIFICANT CHANGE UP (ref 3.5–5.3)
POTASSIUM SERPL-MCNC: 4.5 MMOL/L — SIGNIFICANT CHANGE UP (ref 3.5–5.3)
POTASSIUM SERPL-MCNC: 4.6 MMOL/L — SIGNIFICANT CHANGE UP (ref 3.5–5.3)
POTASSIUM SERPL-MCNC: 4.6 MMOL/L — SIGNIFICANT CHANGE UP (ref 3.5–5.3)
POTASSIUM SERPL-MCNC: 4.7 MMOL/L — SIGNIFICANT CHANGE UP (ref 3.5–5.3)
POTASSIUM SERPL-MCNC: 4.8 MMOL/L — SIGNIFICANT CHANGE UP (ref 3.5–5.3)
POTASSIUM SERPL-MCNC: 5 MMOL/L — SIGNIFICANT CHANGE UP (ref 3.5–5.3)
POTASSIUM SERPL-SCNC: 3.7 MMOL/L — SIGNIFICANT CHANGE UP (ref 3.5–5.3)
POTASSIUM SERPL-SCNC: 4 MMOL/L — SIGNIFICANT CHANGE UP (ref 3.5–5.3)
POTASSIUM SERPL-SCNC: 4 MMOL/L — SIGNIFICANT CHANGE UP (ref 3.5–5.3)
POTASSIUM SERPL-SCNC: 4.1 MMOL/L — SIGNIFICANT CHANGE UP (ref 3.5–5.3)
POTASSIUM SERPL-SCNC: 4.2 MMOL/L — SIGNIFICANT CHANGE UP (ref 3.5–5.3)
POTASSIUM SERPL-SCNC: 4.2 MMOL/L — SIGNIFICANT CHANGE UP (ref 3.5–5.3)
POTASSIUM SERPL-SCNC: 4.3 MMOL/L — SIGNIFICANT CHANGE UP (ref 3.5–5.3)
POTASSIUM SERPL-SCNC: 4.4 MMOL/L — SIGNIFICANT CHANGE UP (ref 3.5–5.3)
POTASSIUM SERPL-SCNC: 4.5 MMOL/L — SIGNIFICANT CHANGE UP (ref 3.5–5.3)
POTASSIUM SERPL-SCNC: 4.6 MMOL/L — SIGNIFICANT CHANGE UP (ref 3.5–5.3)
POTASSIUM SERPL-SCNC: 4.6 MMOL/L — SIGNIFICANT CHANGE UP (ref 3.5–5.3)
POTASSIUM SERPL-SCNC: 4.7 MMOL/L — SIGNIFICANT CHANGE UP (ref 3.5–5.3)
POTASSIUM SERPL-SCNC: 4.8 MMOL/L — SIGNIFICANT CHANGE UP (ref 3.5–5.3)
POTASSIUM SERPL-SCNC: 5 MMOL/L — SIGNIFICANT CHANGE UP (ref 3.5–5.3)
PROCALCITONIN SERPL-MCNC: 0.08 NG/ML — SIGNIFICANT CHANGE UP (ref 0.02–0.1)
PROCALCITONIN SERPL-MCNC: 0.27 NG/ML — HIGH (ref 0.02–0.1)
PROT SERPL-MCNC: 5.6 G/DL — LOW (ref 6.6–8.7)
PROT SERPL-MCNC: 5.7 G/DL — LOW (ref 6.6–8.7)
PROT SERPL-MCNC: 5.7 G/DL — LOW (ref 6.6–8.7)
PROT SERPL-MCNC: 6.4 G/DL — LOW (ref 6.6–8.7)
PROT UR-MCNC: 100 MG/DL
PROT UR-MCNC: 100 MG/DL
PROTHROM AB SERPL-ACNC: 11.6 SEC — SIGNIFICANT CHANGE UP (ref 9.9–13.4)
PROTHROM AB SERPL-ACNC: 11.7 SEC — SIGNIFICANT CHANGE UP (ref 9.9–13.4)
PROTHROM AB SERPL-ACNC: 13.2 SEC — SIGNIFICANT CHANGE UP (ref 9.9–13.4)
RAPID RVP RESULT: SIGNIFICANT CHANGE UP
RBC # BLD: 4.26 M/UL — SIGNIFICANT CHANGE UP (ref 4.2–5.8)
RBC # BLD: 4.37 M/UL — SIGNIFICANT CHANGE UP (ref 4.2–5.8)
RBC # BLD: 4.46 M/UL — SIGNIFICANT CHANGE UP (ref 4.2–5.8)
RBC # BLD: 4.47 M/UL — SIGNIFICANT CHANGE UP (ref 4.2–5.8)
RBC # BLD: 4.49 M/UL — SIGNIFICANT CHANGE UP (ref 4.2–5.8)
RBC # BLD: 4.5 M/UL — SIGNIFICANT CHANGE UP (ref 4.2–5.8)
RBC # BLD: 4.58 M/UL — SIGNIFICANT CHANGE UP (ref 4.2–5.8)
RBC # BLD: 4.61 M/UL — SIGNIFICANT CHANGE UP (ref 4.2–5.8)
RBC # BLD: 4.62 M/UL — SIGNIFICANT CHANGE UP (ref 4.2–5.8)
RBC # BLD: 4.87 M/UL — SIGNIFICANT CHANGE UP (ref 4.2–5.8)
RBC # BLD: 5.39 M/UL — SIGNIFICANT CHANGE UP (ref 4.2–5.8)
RBC # BLD: 5.42 M/UL — SIGNIFICANT CHANGE UP (ref 4.2–5.8)
RBC # FLD: 12.9 % — SIGNIFICANT CHANGE UP (ref 10.3–14.5)
RBC # FLD: 13.1 % — SIGNIFICANT CHANGE UP (ref 10.3–14.5)
RBC # FLD: 13.2 % — SIGNIFICANT CHANGE UP (ref 10.3–14.5)
RBC # FLD: 13.3 % — SIGNIFICANT CHANGE UP (ref 10.3–14.5)
RBC # FLD: 13.5 % — SIGNIFICANT CHANGE UP (ref 10.3–14.5)
RBC # FLD: 13.7 % — SIGNIFICANT CHANGE UP (ref 10.3–14.5)
RBC # FLD: 13.7 % — SIGNIFICANT CHANGE UP (ref 10.3–14.5)
RBC # FLD: 13.8 % — SIGNIFICANT CHANGE UP (ref 10.3–14.5)
RBC # FLD: 13.9 % — SIGNIFICANT CHANGE UP (ref 10.3–14.5)
RBC CASTS # UR COMP ASSIST: 16 /HPF — HIGH (ref 0–4)
RBC CASTS # UR COMP ASSIST: 258 /HPF — HIGH (ref 0–4)
S AUREUS DNA NOSE QL NAA+PROBE: SIGNIFICANT CHANGE UP
SAO2 % BLDA: 96.9 % — SIGNIFICANT CHANGE UP (ref 94–98)
SAO2 % BLDA: 97.4 % — SIGNIFICANT CHANGE UP (ref 94–98)
SAO2 % BLDA: 99.4 % — HIGH (ref 94–98)
SARS-COV-2 RNA SPEC QL NAA+PROBE: SIGNIFICANT CHANGE UP
SODIUM SERPL-SCNC: 137 MMOL/L — SIGNIFICANT CHANGE UP (ref 135–145)
SODIUM SERPL-SCNC: 138 MMOL/L — SIGNIFICANT CHANGE UP (ref 135–145)
SODIUM SERPL-SCNC: 140 MMOL/L — SIGNIFICANT CHANGE UP (ref 135–145)
SODIUM SERPL-SCNC: 142 MMOL/L — SIGNIFICANT CHANGE UP (ref 135–145)
SODIUM SERPL-SCNC: 144 MMOL/L — SIGNIFICANT CHANGE UP (ref 135–145)
SODIUM SERPL-SCNC: 145 MMOL/L — SIGNIFICANT CHANGE UP (ref 135–145)
SODIUM SERPL-SCNC: 145 MMOL/L — SIGNIFICANT CHANGE UP (ref 135–145)
SODIUM SERPL-SCNC: 146 MMOL/L — HIGH (ref 135–145)
SODIUM SERPL-SCNC: 147 MMOL/L — HIGH (ref 135–145)
SODIUM SERPL-SCNC: 148 MMOL/L — HIGH (ref 135–145)
SODIUM SERPL-SCNC: 148 MMOL/L — HIGH (ref 135–145)
SODIUM SERPL-SCNC: 149 MMOL/L — HIGH (ref 135–145)
SODIUM SERPL-SCNC: 150 MMOL/L — HIGH (ref 135–145)
SODIUM SERPL-SCNC: 150 MMOL/L — HIGH (ref 135–145)
SODIUM SERPL-SCNC: 151 MMOL/L — HIGH (ref 135–145)
SODIUM SERPL-SCNC: 153 MMOL/L — HIGH (ref 135–145)
SODIUM SERPL-SCNC: 154 MMOL/L — HIGH (ref 135–145)
SP GR SPEC: 1.02 — SIGNIFICANT CHANGE UP (ref 1–1.03)
SP GR SPEC: 1.03 — SIGNIFICANT CHANGE UP (ref 1–1.03)
SPECIMEN SOURCE: SIGNIFICANT CHANGE UP
SQUAMOUS # UR AUTO: 2 /HPF — SIGNIFICANT CHANGE UP (ref 0–5)
SQUAMOUS # UR AUTO: 4 /HPF — SIGNIFICANT CHANGE UP (ref 0–5)
T3 SERPL-MCNC: 110 NG/DL — SIGNIFICANT CHANGE UP (ref 80–200)
T4 AB SER-ACNC: 7.9 UG/DL — SIGNIFICANT CHANGE UP (ref 4.5–12)
T4 FREE SERPL-MCNC: 1.3 NG/DL — SIGNIFICANT CHANGE UP (ref 0.9–1.7)
TRIGL SERPL-MCNC: 168 MG/DL — HIGH
TSH SERPL-MCNC: 2.09 UIU/ML — SIGNIFICANT CHANGE UP (ref 0.27–4.2)
URATE CRY FLD QL MICRO: PRESENT
UROBILINOGEN FLD QL: 1 MG/DL — SIGNIFICANT CHANGE UP (ref 0.2–1)
UROBILINOGEN FLD QL: 1 MG/DL — SIGNIFICANT CHANGE UP (ref 0.2–1)
VALPROATE SERPL-MCNC: 26.1 UG/ML — LOW (ref 50–100)
VALPROATE SERPL-MCNC: 32 UG/ML — LOW (ref 50–100)
WBC # BLD: 10.21 K/UL — SIGNIFICANT CHANGE UP (ref 3.8–10.5)
WBC # BLD: 10.31 K/UL — SIGNIFICANT CHANGE UP (ref 3.8–10.5)
WBC # BLD: 10.54 K/UL — HIGH (ref 3.8–10.5)
WBC # BLD: 11.06 K/UL — HIGH (ref 3.8–10.5)
WBC # BLD: 11.13 K/UL — HIGH (ref 3.8–10.5)
WBC # BLD: 11.47 K/UL — HIGH (ref 3.8–10.5)
WBC # BLD: 12.03 K/UL — HIGH (ref 3.8–10.5)
WBC # BLD: 12.49 K/UL — HIGH (ref 3.8–10.5)
WBC # BLD: 12.8 K/UL — HIGH (ref 3.8–10.5)
WBC # BLD: 17 K/UL — HIGH (ref 3.8–10.5)
WBC # BLD: 17.61 K/UL — HIGH (ref 3.8–10.5)
WBC # BLD: 9.36 K/UL — SIGNIFICANT CHANGE UP (ref 3.8–10.5)
WBC # FLD AUTO: 10.21 K/UL — SIGNIFICANT CHANGE UP (ref 3.8–10.5)
WBC # FLD AUTO: 10.31 K/UL — SIGNIFICANT CHANGE UP (ref 3.8–10.5)
WBC # FLD AUTO: 10.54 K/UL — HIGH (ref 3.8–10.5)
WBC # FLD AUTO: 11.06 K/UL — HIGH (ref 3.8–10.5)
WBC # FLD AUTO: 11.13 K/UL — HIGH (ref 3.8–10.5)
WBC # FLD AUTO: 11.47 K/UL — HIGH (ref 3.8–10.5)
WBC # FLD AUTO: 12.03 K/UL — HIGH (ref 3.8–10.5)
WBC # FLD AUTO: 12.49 K/UL — HIGH (ref 3.8–10.5)
WBC # FLD AUTO: 12.8 K/UL — HIGH (ref 3.8–10.5)
WBC # FLD AUTO: 17 K/UL — HIGH (ref 3.8–10.5)
WBC # FLD AUTO: 17.61 K/UL — HIGH (ref 3.8–10.5)
WBC # FLD AUTO: 9.36 K/UL — SIGNIFICANT CHANGE UP (ref 3.8–10.5)
WBC UR QL: 5 /HPF — SIGNIFICANT CHANGE UP (ref 0–5)
WBC UR QL: 64 /HPF — HIGH (ref 0–5)

## 2024-01-01 PROCEDURE — 99291 CRITICAL CARE FIRST HOUR: CPT

## 2024-01-01 PROCEDURE — 70450 CT HEAD/BRAIN W/O DYE: CPT | Mod: 26,77

## 2024-01-01 PROCEDURE — 70450 CT HEAD/BRAIN W/O DYE: CPT | Mod: 26

## 2024-01-01 PROCEDURE — 71045 X-RAY EXAM CHEST 1 VIEW: CPT | Mod: 26,77

## 2024-01-01 PROCEDURE — 93010 ELECTROCARDIOGRAM REPORT: CPT

## 2024-01-01 PROCEDURE — 70553 MRI BRAIN STEM W/O & W/DYE: CPT | Mod: 26

## 2024-01-01 PROCEDURE — 93306 TTE W/DOPPLER COMPLETE: CPT | Mod: 26

## 2024-01-01 PROCEDURE — 99222 1ST HOSP IP/OBS MODERATE 55: CPT

## 2024-01-01 PROCEDURE — 99223 1ST HOSP IP/OBS HIGH 75: CPT

## 2024-01-01 PROCEDURE — 71045 X-RAY EXAM CHEST 1 VIEW: CPT | Mod: 26

## 2024-01-01 PROCEDURE — 95720 EEG PHY/QHP EA INCR W/VEEG: CPT

## 2024-01-01 PROCEDURE — 71045 X-RAY EXAM CHEST 1 VIEW: CPT | Mod: 26,76

## 2024-01-01 PROCEDURE — 95813 EEG EXTND MNTR 61-119 MIN: CPT | Mod: 26

## 2024-01-01 PROCEDURE — 70551 MRI BRAIN STEM W/O DYE: CPT | Mod: 26

## 2024-01-01 DEVICE — MAYFIELD SKULL PIN ADULT STEEL: Type: IMPLANTABLE DEVICE | Status: FUNCTIONAL

## 2024-01-01 DEVICE — SURGICEL 2 X 14": Type: IMPLANTABLE DEVICE | Status: FUNCTIONAL

## 2024-01-01 DEVICE — GRAFT DURAL MATRX 3 X 3IN DURGN: Type: IMPLANTABLE DEVICE | Status: FUNCTIONAL

## 2024-01-01 DEVICE — GRAFT DURAL MATRX 4X5IN DURGN: Type: IMPLANTABLE DEVICE | Status: FUNCTIONAL

## 2024-01-01 DEVICE — SURGICEL FIBRILLAR 4 X 4": Type: IMPLANTABLE DEVICE | Status: FUNCTIONAL

## 2024-01-01 DEVICE — SURGIFLO MATRIX WITH THROMBIN KIT: Type: IMPLANTABLE DEVICE | Status: FUNCTIONAL

## 2024-01-01 DEVICE — KIT A-LINE 1LUM 20G X 12CM SAFE KIT: Type: IMPLANTABLE DEVICE | Status: FUNCTIONAL

## 2024-01-01 DEVICE — SURGIFOAM PAD 8CM X 12.5CM X 10MM (100): Type: IMPLANTABLE DEVICE | Status: FUNCTIONAL

## 2024-01-01 RX ORDER — INFLUENZA A VIRUS A/WISCONSIN/588/2019 (H1N1) RECOMBINANT HEMAGGLUTININ ANTIGEN, INFLUENZA A VIRUS A/DARWIN/6/2021 (H3N2) RECOMBINANT HEMAGGLUTININ ANTIGEN, INFLUENZA B VIRUS B/AUSTRIA/1359417/2021 RECOMBINANT HEMAGGLUTININ ANTIGEN, AND INFLUENZA B VIRUS B/PHUKET/3073/2013 RECOMBINANT HEMAGGLUTININ ANTIGEN 45; 45; 45; 45 UG/.5ML; UG/.5ML; UG/.5ML; UG/.5ML
0.5 INJECTION INTRAMUSCULAR ONCE
Refills: 0 | Status: COMPLETED | OUTPATIENT
Start: 2024-01-01 | End: 2024-01-01

## 2024-01-01 RX ORDER — POLYETHYLENE GLYCOL 3350 17 G/DOSE
17 POWDER (GRAM) ORAL EVERY 12 HOURS
Refills: 0 | Status: DISCONTINUED | OUTPATIENT
Start: 2024-01-01 | End: 2024-01-01

## 2024-01-01 RX ORDER — PIPERACILLIN AND TAZOBACTAM 3; .375 G/15ML; G/15ML
3.38 INJECTION, POWDER, LYOPHILIZED, FOR SOLUTION INTRAVENOUS ONCE
Refills: 0 | Status: COMPLETED | OUTPATIENT
Start: 2024-01-01 | End: 2024-01-01

## 2024-01-01 RX ORDER — METOPROLOL TARTRATE 50 MG
50 TABLET ORAL EVERY 8 HOURS
Refills: 0 | Status: DISCONTINUED | OUTPATIENT
Start: 2024-01-01 | End: 2024-01-01

## 2024-01-01 RX ORDER — DEXTROSE MONOHYDRATE 25 G/50ML
25 INJECTION, SOLUTION INTRAVENOUS ONCE
Refills: 0 | Status: DISCONTINUED | OUTPATIENT
Start: 2024-01-01 | End: 2024-01-01

## 2024-01-01 RX ORDER — CHLORHEXIDINE GLUCONATE 1.2 MG/ML
15 RINSE ORAL EVERY 12 HOURS
Refills: 0 | Status: DISCONTINUED | OUTPATIENT
Start: 2024-01-01 | End: 2025-01-01

## 2024-01-01 RX ORDER — METHYLPREDNISOLONE 4 MG/1
125 TABLET ORAL ONCE
Refills: 0 | Status: COMPLETED | OUTPATIENT
Start: 2024-01-01 | End: 2024-01-01

## 2024-01-01 RX ORDER — MIDAZOLAM HYDROCHLORIDE 1 MG/ML
2 INJECTION INTRAMUSCULAR; INTRAVENOUS ONCE
Refills: 0 | Status: DISCONTINUED | OUTPATIENT
Start: 2024-01-01 | End: 2024-01-01

## 2024-01-01 RX ORDER — ACETAMINOPHEN 80 MG/.8ML
1000 SOLUTION/ DROPS ORAL ONCE
Refills: 0 | Status: COMPLETED | OUTPATIENT
Start: 2024-01-01 | End: 2025-01-01

## 2024-01-01 RX ORDER — SODIUM CHLORIDE 9 MG/ML
500 INJECTION, SOLUTION INTRAVENOUS ONCE
Refills: 0 | Status: COMPLETED | OUTPATIENT
Start: 2024-01-01 | End: 2024-01-01

## 2024-01-01 RX ORDER — POLYETHYLENE GLYCOL 3350 17 G/DOSE
17 POWDER (GRAM) ORAL
Refills: 0 | Status: DISCONTINUED | OUTPATIENT
Start: 2024-01-01 | End: 2025-01-01

## 2024-01-01 RX ORDER — LORAZEPAM 1 MG/1
1 TABLET ORAL
Refills: 0 | Status: DISCONTINUED | OUTPATIENT
Start: 2024-01-01 | End: 2024-01-01

## 2024-01-01 RX ORDER — FUROSEMIDE 20 MG
40 TABLET ORAL ONCE
Refills: 0 | Status: COMPLETED | OUTPATIENT
Start: 2024-01-01 | End: 2024-01-01

## 2024-01-01 RX ORDER — PANTOPRAZOLE 40 MG/1
40 TABLET, DELAYED RELEASE ORAL DAILY
Refills: 0 | Status: DISCONTINUED | OUTPATIENT
Start: 2024-01-01 | End: 2024-01-01

## 2024-01-01 RX ORDER — BISACODYL 5 MG
10 TABLET, DELAYED RELEASE (ENTERIC COATED) ORAL ONCE
Refills: 0 | Status: COMPLETED | OUTPATIENT
Start: 2024-01-01 | End: 2024-01-01

## 2024-01-01 RX ORDER — VITAMIN A 10000 UNIT
1 TABLET ORAL DAILY
Refills: 0 | Status: DISCONTINUED | OUTPATIENT
Start: 2024-01-01 | End: 2025-01-01

## 2024-01-01 RX ORDER — LABETALOL HCL 300 MG/1
10 TABLET, FILM COATED ORAL
Refills: 0 | Status: DISCONTINUED | OUTPATIENT
Start: 2024-01-01 | End: 2024-01-01

## 2024-01-01 RX ORDER — CHLORHEXIDINE GLUCONATE 1.2 MG/ML
15 RINSE ORAL EVERY 12 HOURS
Refills: 0 | Status: DISCONTINUED | OUTPATIENT
Start: 2024-01-01 | End: 2024-01-01

## 2024-01-01 RX ORDER — SODIUM CHLORIDE 9 MG/ML
1000 INJECTION, SOLUTION INTRAVENOUS
Refills: 0 | Status: DISCONTINUED | OUTPATIENT
Start: 2024-01-01 | End: 2024-01-01

## 2024-01-01 RX ORDER — FENTANYL 75 UG/H
50 PATCH, EXTENDED RELEASE TRANSDERMAL ONCE
Refills: 0 | Status: DISCONTINUED | OUTPATIENT
Start: 2024-01-01 | End: 2025-01-01

## 2024-01-01 RX ORDER — HYDRALAZINE HYDROCHLORIDE 10 MG/1
10 TABLET ORAL
Refills: 0 | Status: DISCONTINUED | OUTPATIENT
Start: 2024-01-01 | End: 2024-01-01

## 2024-01-01 RX ORDER — CEFAZOLIN SODIUM 1 G
2000 VIAL (EA) INJECTION EVERY 8 HOURS
Refills: 0 | Status: COMPLETED | OUTPATIENT
Start: 2024-01-01 | End: 2024-01-01

## 2024-01-01 RX ORDER — ACETAMINOPHEN 80 MG/.8ML
1000 SOLUTION/ DROPS ORAL ONCE
Refills: 0 | Status: COMPLETED | OUTPATIENT
Start: 2024-01-01 | End: 2024-01-01

## 2024-01-01 RX ORDER — MAGNESIUM SULFATE 500 MG/ML
1 INJECTION, SOLUTION INTRAMUSCULAR; INTRAVENOUS ONCE
Refills: 0 | Status: COMPLETED | OUTPATIENT
Start: 2024-01-01 | End: 2024-01-01

## 2024-01-01 RX ORDER — CHLORDIAZEPOXIDE HCL 5 MG
25 CAPSULE ORAL EVERY 8 HOURS
Refills: 0 | Status: DISCONTINUED | OUTPATIENT
Start: 2024-01-01 | End: 2024-01-01

## 2024-01-01 RX ORDER — VALPROIC ACID 250 MG/5ML
250 SOLUTION ORAL EVERY 6 HOURS
Refills: 0 | Status: DISCONTINUED | OUTPATIENT
Start: 2024-01-01 | End: 2024-01-01

## 2024-01-01 RX ORDER — HEPARIN SODIUM 1000 [USP'U]/ML
5000 INJECTION, SOLUTION INTRAVENOUS; SUBCUTANEOUS EVERY 8 HOURS
Refills: 0 | Status: DISCONTINUED | OUTPATIENT
Start: 2024-01-01 | End: 2024-01-01

## 2024-01-01 RX ORDER — PIPERACILLIN AND TAZOBACTAM 3; .375 G/15ML; G/15ML
3.38 INJECTION, POWDER, LYOPHILIZED, FOR SOLUTION INTRAVENOUS ONCE
Refills: 0 | Status: DISCONTINUED | OUTPATIENT
Start: 2024-01-01 | End: 2024-01-01

## 2024-01-01 RX ORDER — PIPERACILLIN AND TAZOBACTAM 3; .375 G/15ML; G/15ML
3.38 INJECTION, POWDER, LYOPHILIZED, FOR SOLUTION INTRAVENOUS EVERY 8 HOURS
Refills: 0 | Status: DISCONTINUED | OUTPATIENT
Start: 2024-01-01 | End: 2024-01-01

## 2024-01-01 RX ORDER — SODIUM CHLORIDE 5 % 5 %
1000 INTRAVENOUS SOLUTION INTRAVENOUS
Refills: 0 | Status: DISCONTINUED | OUTPATIENT
Start: 2024-01-01 | End: 2024-01-01

## 2024-01-01 RX ORDER — FENTANYL 75 UG/H
50 PATCH, EXTENDED RELEASE TRANSDERMAL
Refills: 0 | Status: DISCONTINUED | OUTPATIENT
Start: 2024-01-01 | End: 2024-01-01

## 2024-01-01 RX ORDER — FENTANYL 75 UG/H
50 PATCH, EXTENDED RELEASE TRANSDERMAL ONCE
Refills: 0 | Status: DISCONTINUED | OUTPATIENT
Start: 2024-01-01 | End: 2024-01-01

## 2024-01-01 RX ORDER — METOPROLOL TARTRATE 50 MG
25 TABLET ORAL EVERY 8 HOURS
Refills: 0 | Status: DISCONTINUED | OUTPATIENT
Start: 2024-01-01 | End: 2024-01-01

## 2024-01-01 RX ORDER — CHOLECALCIFEROL (VITAMIN D3) 10 MCG
1 TABLET ORAL
Refills: 0 | DISCHARGE

## 2024-01-01 RX ORDER — LABETALOL HCL 300 MG/1
10 TABLET, FILM COATED ORAL
Refills: 0 | Status: DISCONTINUED | OUTPATIENT
Start: 2024-01-01 | End: 2025-01-01

## 2024-01-01 RX ORDER — VALSARTAN 80 MG/1
80 TABLET ORAL ONCE
Refills: 0 | Status: COMPLETED | OUTPATIENT
Start: 2024-01-01 | End: 2024-01-01

## 2024-01-01 RX ORDER — ATORVASTATIN CALCIUM 40 MG/1
40 TABLET, FILM COATED ORAL AT BEDTIME
Refills: 0 | Status: DISCONTINUED | OUTPATIENT
Start: 2024-01-01 | End: 2025-01-01

## 2024-01-01 RX ORDER — VALSARTAN 80 MG/1
160 TABLET ORAL DAILY
Refills: 0 | Status: DISCONTINUED | OUTPATIENT
Start: 2024-01-01 | End: 2024-01-01

## 2024-01-01 RX ORDER — SODIUM CHLORIDE 9 MG/ML
1000 INJECTION, SOLUTION INTRAMUSCULAR; INTRAVENOUS; SUBCUTANEOUS
Refills: 0 | Status: DISCONTINUED | OUTPATIENT
Start: 2024-01-01 | End: 2024-01-01

## 2024-01-01 RX ORDER — ATORVASTATIN CALCIUM 40 MG/1
40 TABLET, FILM COATED ORAL AT BEDTIME
Refills: 0 | Status: DISCONTINUED | OUTPATIENT
Start: 2024-01-01 | End: 2024-01-01

## 2024-01-01 RX ORDER — THIAMINE HYDROCHLORIDE 100 MG/ML
500 INJECTION, SOLUTION INTRAMUSCULAR; INTRAVENOUS EVERY 8 HOURS
Refills: 0 | Status: COMPLETED | OUTPATIENT
Start: 2024-01-01 | End: 2024-01-01

## 2024-01-01 RX ORDER — HYDRALAZINE HYDROCHLORIDE 10 MG/1
50 TABLET ORAL EVERY 8 HOURS
Refills: 0 | Status: DISCONTINUED | OUTPATIENT
Start: 2024-01-01 | End: 2024-01-01

## 2024-01-01 RX ORDER — CHLORDIAZEPOXIDE HCL 5 MG
50 CAPSULE ORAL EVERY 6 HOURS
Refills: 0 | Status: DISCONTINUED | OUTPATIENT
Start: 2024-01-01 | End: 2024-01-01

## 2024-01-01 RX ORDER — CEFTRIAXONE SODIUM 1 G/1
1000 INJECTION, POWDER, FOR SOLUTION INTRAMUSCULAR; INTRAVENOUS EVERY 24 HOURS
Refills: 0 | Status: DISCONTINUED | OUTPATIENT
Start: 2024-01-01 | End: 2024-01-01

## 2024-01-01 RX ORDER — SENNOSIDES 8.6 MG/1
2 TABLET, FILM COATED ORAL AT BEDTIME
Refills: 0 | Status: DISCONTINUED | OUTPATIENT
Start: 2024-01-01 | End: 2025-01-01

## 2024-01-01 RX ORDER — FENTANYL 75 UG/H
25 PATCH, EXTENDED RELEASE TRANSDERMAL
Refills: 0 | Status: DISCONTINUED | OUTPATIENT
Start: 2024-01-01 | End: 2024-01-01

## 2024-01-01 RX ORDER — SODIUM CHLORIDE 9 MG/ML
500 INJECTION, SOLUTION INTRAMUSCULAR; INTRAVENOUS; SUBCUTANEOUS ONCE
Refills: 0 | Status: COMPLETED | OUTPATIENT
Start: 2024-01-01 | End: 2024-01-01

## 2024-01-01 RX ORDER — ENOXAPARIN SODIUM 60 MG/.6ML
40 INJECTION INTRAVENOUS; SUBCUTANEOUS
Refills: 0 | Status: DISCONTINUED | OUTPATIENT
Start: 2024-01-01 | End: 2024-01-01

## 2024-01-01 RX ORDER — ALBUTEROL SULFATE 90 UG/1
2.5 INHALANT RESPIRATORY (INHALATION) EVERY 6 HOURS
Refills: 0 | Status: COMPLETED | OUTPATIENT
Start: 2024-01-01 | End: 2024-01-01

## 2024-01-01 RX ORDER — SOD PHOS DI, MONO/K PHOS MONO 250 MG
1 TABLET ORAL ONCE
Refills: 0 | Status: COMPLETED | OUTPATIENT
Start: 2024-01-01 | End: 2024-01-01

## 2024-01-01 RX ORDER — PHENOBARBITAL 15 MG
130 TABLET ORAL ONCE
Refills: 0 | Status: DISCONTINUED | OUTPATIENT
Start: 2024-01-01 | End: 2024-01-01

## 2024-01-01 RX ORDER — VALSARTAN 80 MG/1
80 TABLET ORAL DAILY
Refills: 0 | Status: DISCONTINUED | OUTPATIENT
Start: 2024-01-01 | End: 2024-01-01

## 2024-01-01 RX ORDER — METOPROLOL TARTRATE 50 MG
1 TABLET ORAL
Refills: 0 | DISCHARGE

## 2024-01-01 RX ORDER — CHLORDIAZEPOXIDE HCL 5 MG
25 CAPSULE ORAL ONCE
Refills: 0 | Status: DISCONTINUED | OUTPATIENT
Start: 2024-01-01 | End: 2024-01-01

## 2024-01-01 RX ORDER — DEXTROSE MONOHYDRATE 25 G/50ML
15 INJECTION, SOLUTION INTRAVENOUS ONCE
Refills: 0 | Status: DISCONTINUED | OUTPATIENT
Start: 2024-01-01 | End: 2024-01-01

## 2024-01-01 RX ORDER — ETOMIDATE 40 MG/20ML
20 INJECTION, SOLUTION INTRAVENOUS ONCE
Refills: 0 | Status: COMPLETED | OUTPATIENT
Start: 2024-01-01 | End: 2024-01-01

## 2024-01-01 RX ORDER — DEXMEDETOMIDINE HYDROCHLORIDE 4 UG/ML
0.2 INJECTION, SOLUTION INTRAVENOUS
Qty: 200 | Refills: 0 | Status: DISCONTINUED | OUTPATIENT
Start: 2024-01-01 | End: 2024-01-01

## 2024-01-01 RX ORDER — NICARDIPINE HYDROCHLORIDE 2.5 MG/ML
5 INJECTION INTRAVENOUS
Qty: 40 | Refills: 0 | Status: DISCONTINUED | OUTPATIENT
Start: 2024-01-01 | End: 2024-01-01

## 2024-01-01 RX ORDER — CHLORHEXIDINE GLUCONATE 1.2 MG/ML
1 RINSE ORAL DAILY
Refills: 0 | Status: DISCONTINUED | OUTPATIENT
Start: 2024-01-01 | End: 2025-01-01

## 2024-01-01 RX ORDER — ONDANSETRON 4 MG/1
4 TABLET ORAL EVERY 6 HOURS
Refills: 0 | Status: DISCONTINUED | OUTPATIENT
Start: 2024-01-01 | End: 2025-01-01

## 2024-01-01 RX ORDER — DEXTROSE MONOHYDRATE 25 G/50ML
12.5 INJECTION, SOLUTION INTRAVENOUS ONCE
Refills: 0 | Status: DISCONTINUED | OUTPATIENT
Start: 2024-01-01 | End: 2024-01-01

## 2024-01-01 RX ORDER — CHLORDIAZEPOXIDE HCL 5 MG
50 CAPSULE ORAL EVERY 8 HOURS
Refills: 0 | Status: DISCONTINUED | OUTPATIENT
Start: 2024-01-01 | End: 2024-01-01

## 2024-01-01 RX ORDER — ACETYLCYSTEINE 200 MG/ML
4 VIAL (ML) MISCELLANEOUS EVERY 6 HOURS
Refills: 0 | Status: COMPLETED | OUTPATIENT
Start: 2024-01-01 | End: 2024-01-01

## 2024-01-01 RX ORDER — B COMPLEX, C NO.20/FOLIC ACID 1 MG
1 CAPSULE ORAL
Refills: 0 | DISCHARGE

## 2024-01-01 RX ORDER — DEXMEDETOMIDINE HYDROCHLORIDE 4 UG/ML
0.2 INJECTION, SOLUTION INTRAVENOUS
Qty: 400 | Refills: 0 | Status: DISCONTINUED | OUTPATIENT
Start: 2024-01-01 | End: 2024-01-01

## 2024-01-01 RX ORDER — VALSARTAN 80 MG/1
160 TABLET ORAL DAILY
Refills: 0 | Status: DISCONTINUED | OUTPATIENT
Start: 2024-01-01 | End: 2025-01-01

## 2024-01-01 RX ORDER — ROCURONIUM BROMIDE 50 MG/5 ML
100 SYRINGE (ML) INTRAVENOUS ONCE
Refills: 0 | Status: COMPLETED | OUTPATIENT
Start: 2024-01-01 | End: 2024-01-01

## 2024-01-01 RX ORDER — ACETYLCYSTEINE 200 MG/ML
4 VIAL (ML) MISCELLANEOUS EVERY 6 HOURS
Refills: 0 | Status: COMPLETED | OUTPATIENT
Start: 2024-01-01 | End: 2025-01-01

## 2024-01-01 RX ORDER — CARVEDILOL 25 MG/1
25 TABLET, FILM COATED ORAL EVERY 12 HOURS
Refills: 0 | Status: DISCONTINUED | OUTPATIENT
Start: 2024-01-01 | End: 2025-01-01

## 2024-01-01 RX ORDER — HYDRALAZINE HYDROCHLORIDE 10 MG/1
100 TABLET ORAL EVERY 8 HOURS
Refills: 0 | Status: DISCONTINUED | OUTPATIENT
Start: 2024-01-01 | End: 2025-01-01

## 2024-01-01 RX ORDER — ASPIRIN 81 MG
1 TABLET, DELAYED RELEASE (ENTERIC COATED) ORAL
Refills: 0 | DISCHARGE

## 2024-01-01 RX ORDER — APIXABAN 5 MG/1
1 TABLET, FILM COATED ORAL
Refills: 0 | DISCHARGE

## 2024-01-01 RX ORDER — PROPOFOL 10 MG/ML
30 INJECTION, EMULSION INTRAVENOUS ONCE
Refills: 0 | Status: COMPLETED | OUTPATIENT
Start: 2024-01-01 | End: 2024-01-01

## 2024-01-01 RX ORDER — TAMSULOSIN HYDROCHLORIDE 0.4 MG/1
0.4 CAPSULE ORAL AT BEDTIME
Refills: 0 | Status: DISCONTINUED | OUTPATIENT
Start: 2024-01-01 | End: 2024-01-01

## 2024-01-01 RX ORDER — HYDRALAZINE HYDROCHLORIDE 10 MG/1
10 TABLET ORAL
Refills: 0 | Status: DISCONTINUED | OUTPATIENT
Start: 2024-01-01 | End: 2025-01-01

## 2024-01-01 RX ORDER — FENTANYL 75 UG/H
50 PATCH, EXTENDED RELEASE TRANSDERMAL EVERY 4 HOURS
Refills: 0 | Status: DISCONTINUED | OUTPATIENT
Start: 2024-01-01 | End: 2024-01-01

## 2024-01-01 RX ORDER — VALPROIC ACID 250 MG/5ML
500 SOLUTION ORAL EVERY 12 HOURS
Refills: 0 | Status: DISCONTINUED | OUTPATIENT
Start: 2024-01-01 | End: 2024-01-01

## 2024-01-01 RX ORDER — METOPROLOL TARTRATE 50 MG
25 TABLET ORAL EVERY 12 HOURS
Refills: 0 | Status: DISCONTINUED | OUTPATIENT
Start: 2024-01-01 | End: 2024-01-01

## 2024-01-01 RX ORDER — SENNOSIDES 8.6 MG/1
2 TABLET, FILM COATED ORAL AT BEDTIME
Refills: 0 | Status: DISCONTINUED | OUTPATIENT
Start: 2024-01-01 | End: 2024-01-01

## 2024-01-01 RX ORDER — LEVETIRACETAM 100 MG/ML
3000 SOLUTION ORAL ONCE
Refills: 0 | Status: COMPLETED | OUTPATIENT
Start: 2024-01-01 | End: 2024-01-01

## 2024-01-01 RX ORDER — VITAMIN A 10000 UNIT
1 TABLET ORAL DAILY
Refills: 0 | Status: DISCONTINUED | OUTPATIENT
Start: 2024-01-01 | End: 2024-01-01

## 2024-01-01 RX ORDER — MORPHINE SULFATE 15 MG
2 TABLET, EXTENDED RELEASE ORAL ONCE
Refills: 0 | Status: DISCONTINUED | OUTPATIENT
Start: 2024-01-01 | End: 2024-01-01

## 2024-01-01 RX ORDER — PROTHROMBIN COMPLEX CONCENTRATE (HUMAN) 25.5; 16.5; 24; 22; 22; 26 [IU]/ML; [IU]/ML; [IU]/ML; [IU]/ML; [IU]/ML; [IU]/ML
2500 POWDER, FOR SOLUTION INTRAVENOUS ONCE
Refills: 0 | Status: COMPLETED | OUTPATIENT
Start: 2024-01-01 | End: 2024-01-01

## 2024-01-01 RX ORDER — INSULIN LISPRO 100/ML
VIAL (ML) SUBCUTANEOUS EVERY 6 HOURS
Refills: 0 | Status: DISCONTINUED | OUTPATIENT
Start: 2024-01-01 | End: 2024-01-01

## 2024-01-01 RX ORDER — LABETALOL HCL 300 MG/1
10 TABLET, FILM COATED ORAL ONCE
Refills: 0 | Status: COMPLETED | OUTPATIENT
Start: 2024-01-01 | End: 2024-01-01

## 2024-01-01 RX ORDER — ENOXAPARIN SODIUM 60 MG/.6ML
40 INJECTION INTRAVENOUS; SUBCUTANEOUS EVERY 24 HOURS
Refills: 0 | Status: DISCONTINUED | OUTPATIENT
Start: 2024-01-01 | End: 2024-01-01

## 2024-01-01 RX ORDER — ALBUTEROL SULFATE 90 UG/1
2.5 INHALANT RESPIRATORY (INHALATION) EVERY 6 HOURS
Refills: 0 | Status: DISCONTINUED | OUTPATIENT
Start: 2024-01-01 | End: 2025-01-01

## 2024-01-01 RX ORDER — POTASSIUM CHLORIDE 600 MG/1
20 TABLET, FILM COATED, EXTENDED RELEASE ORAL ONCE
Refills: 0 | Status: COMPLETED | OUTPATIENT
Start: 2024-01-01 | End: 2024-01-01

## 2024-01-01 RX ORDER — METOPROLOL TARTRATE 50 MG
25 TABLET ORAL ONCE
Refills: 0 | Status: DISCONTINUED | OUTPATIENT
Start: 2024-01-01 | End: 2024-01-01

## 2024-01-01 RX ORDER — B COMPLEX, C NO.20/FOLIC ACID 1 MG
1 CAPSULE ORAL DAILY
Refills: 0 | Status: DISCONTINUED | OUTPATIENT
Start: 2024-01-01 | End: 2025-01-01

## 2024-01-01 RX ORDER — OXYCODONE HCL 15 MG
5 TABLET ORAL EVERY 8 HOURS
Refills: 0 | Status: DISCONTINUED | OUTPATIENT
Start: 2024-01-01 | End: 2024-01-01

## 2024-01-01 RX ORDER — INSULIN LISPRO 100/ML
VIAL (ML) SUBCUTANEOUS EVERY 6 HOURS
Refills: 0 | Status: DISCONTINUED | OUTPATIENT
Start: 2024-01-01 | End: 2025-01-01

## 2024-01-01 RX ORDER — PROPOFOL 10 MG/ML
10 INJECTION, EMULSION INTRAVENOUS
Qty: 1000 | Refills: 0 | Status: DISCONTINUED | OUTPATIENT
Start: 2024-01-01 | End: 2024-01-01

## 2024-01-01 RX ORDER — POLYETHYLENE GLYCOL 3350 17 G/DOSE
17 POWDER (GRAM) ORAL DAILY
Refills: 0 | Status: DISCONTINUED | OUTPATIENT
Start: 2024-01-01 | End: 2024-01-01

## 2024-01-01 RX ORDER — CHLORDIAZEPOXIDE HCL 5 MG
CAPSULE ORAL
Refills: 0 | Status: DISCONTINUED | OUTPATIENT
Start: 2024-01-01 | End: 2024-01-01

## 2024-01-01 RX ORDER — LEVETIRACETAM 100 MG/ML
750 SOLUTION ORAL EVERY 12 HOURS
Refills: 0 | Status: DISCONTINUED | OUTPATIENT
Start: 2024-01-01 | End: 2025-01-01

## 2024-01-01 RX ORDER — LORAZEPAM 1 MG/1
0.5 TABLET ORAL ONCE
Refills: 0 | Status: DISCONTINUED | OUTPATIENT
Start: 2024-01-01 | End: 2024-01-01

## 2024-01-01 RX ORDER — PANTOPRAZOLE 40 MG/1
40 TABLET, DELAYED RELEASE ORAL DAILY
Refills: 0 | Status: DISCONTINUED | OUTPATIENT
Start: 2024-01-01 | End: 2025-01-01

## 2024-01-01 RX ORDER — ENOXAPARIN SODIUM 60 MG/.6ML
40 INJECTION INTRAVENOUS; SUBCUTANEOUS
Refills: 0 | Status: DISCONTINUED | OUTPATIENT
Start: 2024-01-01 | End: 2025-01-01

## 2024-01-01 RX ORDER — EZETIMIBE AND SIMVASTATIN 10; 10 MG/1; MG/1
1 TABLET ORAL
Refills: 0 | DISCHARGE

## 2024-01-01 RX ORDER — GLUCAGON INJECTION, SOLUTION 0.5 MG/.1ML
1 INJECTION, SOLUTION SUBCUTANEOUS ONCE
Refills: 0 | Status: DISCONTINUED | OUTPATIENT
Start: 2024-01-01 | End: 2024-01-01

## 2024-01-01 RX ORDER — DOXAZOSIN 1 MG/1
4 TABLET ORAL AT BEDTIME
Refills: 0 | Status: DISCONTINUED | OUTPATIENT
Start: 2024-01-01 | End: 2025-01-01

## 2024-01-01 RX ADMIN — Medication 4 MILLILITER(S): at 15:51

## 2024-01-01 RX ADMIN — SENNOSIDES 2 TABLET(S): 8.6 TABLET, FILM COATED ORAL at 21:34

## 2024-01-01 RX ADMIN — Medication 2: at 11:01

## 2024-01-01 RX ADMIN — HYDRALAZINE HYDROCHLORIDE 10 MILLIGRAM(S): 10 TABLET ORAL at 19:01

## 2024-01-01 RX ADMIN — FENTANYL 25 MICROGRAM(S): 75 PATCH, EXTENDED RELEASE TRANSDERMAL at 22:31

## 2024-01-01 RX ADMIN — CHLORHEXIDINE GLUCONATE 1 APPLICATION(S): 1.2 RINSE ORAL at 11:35

## 2024-01-01 RX ADMIN — Medication 5 MILLIGRAM(S): at 13:46

## 2024-01-01 RX ADMIN — Medication 17 GRAM(S): at 17:19

## 2024-01-01 RX ADMIN — Medication 2: at 17:16

## 2024-01-01 RX ADMIN — DEXMEDETOMIDINE HYDROCHLORIDE 5.09 MICROGRAM(S)/KG/HR: 4 INJECTION, SOLUTION INTRAVENOUS at 19:31

## 2024-01-01 RX ADMIN — Medication 17 GRAM(S): at 11:38

## 2024-01-01 RX ADMIN — Medication 50 MILLIGRAM(S): at 00:47

## 2024-01-01 RX ADMIN — FENTANYL 25 MICROGRAM(S): 75 PATCH, EXTENDED RELEASE TRANSDERMAL at 13:52

## 2024-01-01 RX ADMIN — Medication 5 MILLIGRAM(S): at 21:29

## 2024-01-01 RX ADMIN — CHLORHEXIDINE GLUCONATE 15 MILLILITER(S): 1.2 RINSE ORAL at 17:22

## 2024-01-01 RX ADMIN — ATORVASTATIN CALCIUM 40 MILLIGRAM(S): 40 TABLET, FILM COATED ORAL at 01:35

## 2024-01-01 RX ADMIN — DEXMEDETOMIDINE HYDROCHLORIDE 5.09 MICROGRAM(S)/KG/HR: 4 INJECTION, SOLUTION INTRAVENOUS at 22:21

## 2024-01-01 RX ADMIN — CHLORHEXIDINE GLUCONATE 1 APPLICATION(S): 1.2 RINSE ORAL at 12:02

## 2024-01-01 RX ADMIN — Medication 5 MILLIGRAM(S): at 21:36

## 2024-01-01 RX ADMIN — ATORVASTATIN CALCIUM 40 MILLIGRAM(S): 40 TABLET, FILM COATED ORAL at 21:57

## 2024-01-01 RX ADMIN — Medication 50 MILLIGRAM(S): at 11:17

## 2024-01-01 RX ADMIN — HYDRALAZINE HYDROCHLORIDE 100 MILLIGRAM(S): 10 TABLET ORAL at 14:49

## 2024-01-01 RX ADMIN — HYDRALAZINE HYDROCHLORIDE 50 MILLIGRAM(S): 10 TABLET ORAL at 21:15

## 2024-01-01 RX ADMIN — THIAMINE HYDROCHLORIDE 105 MILLIGRAM(S): 100 INJECTION, SOLUTION INTRAMUSCULAR; INTRAVENOUS at 22:42

## 2024-01-01 RX ADMIN — Medication 25 MILLIGRAM(S): at 21:30

## 2024-01-01 RX ADMIN — FENTANYL 25 MICROGRAM(S): 75 PATCH, EXTENDED RELEASE TRANSDERMAL at 16:32

## 2024-01-01 RX ADMIN — Medication 17 GRAM(S): at 11:01

## 2024-01-01 RX ADMIN — FENTANYL 50 MICROGRAM(S): 75 PATCH, EXTENDED RELEASE TRANSDERMAL at 01:02

## 2024-01-01 RX ADMIN — PIPERACILLIN AND TAZOBACTAM 25 GRAM(S): 3; .375 INJECTION, POWDER, LYOPHILIZED, FOR SOLUTION INTRAVENOUS at 14:35

## 2024-01-01 RX ADMIN — NICARDIPINE HYDROCHLORIDE 25 MG/HR: 2.5 INJECTION INTRAVENOUS at 17:21

## 2024-01-01 RX ADMIN — Medication 1 PACKET(S): at 05:13

## 2024-01-01 RX ADMIN — Medication 50 MILLIGRAM(S): at 05:23

## 2024-01-01 RX ADMIN — Medication 1: at 00:44

## 2024-01-01 RX ADMIN — THIAMINE HYDROCHLORIDE 105 MILLIGRAM(S): 100 INJECTION, SOLUTION INTRAMUSCULAR; INTRAVENOUS at 21:36

## 2024-01-01 RX ADMIN — Medication 4 MILLILITER(S): at 20:09

## 2024-01-01 RX ADMIN — THIAMINE HYDROCHLORIDE 105 MILLIGRAM(S): 100 INJECTION, SOLUTION INTRAMUSCULAR; INTRAVENOUS at 13:53

## 2024-01-01 RX ADMIN — NICARDIPINE HYDROCHLORIDE 25 MG/HR: 2.5 INJECTION INTRAVENOUS at 11:02

## 2024-01-01 RX ADMIN — CHLORHEXIDINE GLUCONATE 1 APPLICATION(S): 1.2 RINSE ORAL at 12:00

## 2024-01-01 RX ADMIN — Medication 25 MILLIGRAM(S): at 15:33

## 2024-01-01 RX ADMIN — Medication 10 MILLIGRAM(S): at 05:18

## 2024-01-01 RX ADMIN — ATORVASTATIN CALCIUM 40 MILLIGRAM(S): 40 TABLET, FILM COATED ORAL at 21:15

## 2024-01-01 RX ADMIN — CHLORHEXIDINE GLUCONATE 1 APPLICATION(S): 1.2 RINSE ORAL at 08:27

## 2024-01-01 RX ADMIN — Medication 4 MILLILITER(S): at 04:30

## 2024-01-01 RX ADMIN — Medication 2000 MILLIGRAM(S): at 18:19

## 2024-01-01 RX ADMIN — HYDRALAZINE HYDROCHLORIDE 10 MILLIGRAM(S): 10 TABLET ORAL at 10:48

## 2024-01-01 RX ADMIN — THIAMINE HYDROCHLORIDE 105 MILLIGRAM(S): 100 INJECTION, SOLUTION INTRAMUSCULAR; INTRAVENOUS at 06:29

## 2024-01-01 RX ADMIN — Medication 50 MILLIGRAM(S): at 17:57

## 2024-01-01 RX ADMIN — ALBUTEROL SULFATE 2.5 MILLIGRAM(S): 90 INHALANT RESPIRATORY (INHALATION) at 21:01

## 2024-01-01 RX ADMIN — Medication 1 MILLIGRAM(S): at 12:10

## 2024-01-01 RX ADMIN — Medication 4 MILLILITER(S): at 09:34

## 2024-01-01 RX ADMIN — LABETALOL HCL 10 MILLIGRAM(S): 300 TABLET, FILM COATED ORAL at 10:16

## 2024-01-01 RX ADMIN — Medication 50 MILLIGRAM(S): at 05:40

## 2024-01-01 RX ADMIN — HYDRALAZINE HYDROCHLORIDE 50 MILLIGRAM(S): 10 TABLET ORAL at 21:10

## 2024-01-01 RX ADMIN — LABETALOL HCL 10 MILLIGRAM(S): 300 TABLET, FILM COATED ORAL at 20:29

## 2024-01-01 RX ADMIN — ALBUTEROL SULFATE 2.5 MILLIGRAM(S): 90 INHALANT RESPIRATORY (INHALATION) at 10:00

## 2024-01-01 RX ADMIN — PIPERACILLIN AND TAZOBACTAM 25 GRAM(S): 3; .375 INJECTION, POWDER, LYOPHILIZED, FOR SOLUTION INTRAVENOUS at 05:07

## 2024-01-01 RX ADMIN — DEXMEDETOMIDINE HYDROCHLORIDE 5.09 MICROGRAM(S)/KG/HR: 4 INJECTION, SOLUTION INTRAVENOUS at 04:50

## 2024-01-01 RX ADMIN — LABETALOL HCL 10 MILLIGRAM(S): 300 TABLET, FILM COATED ORAL at 16:06

## 2024-01-01 RX ADMIN — Medication 50 MILLIGRAM(S): at 11:38

## 2024-01-01 RX ADMIN — Medication 100 MILLIGRAM(S): at 08:45

## 2024-01-01 RX ADMIN — PIPERACILLIN AND TAZOBACTAM 25 GRAM(S): 3; .375 INJECTION, POWDER, LYOPHILIZED, FOR SOLUTION INTRAVENOUS at 21:42

## 2024-01-01 RX ADMIN — DOXAZOSIN 4 MILLIGRAM(S): 1 TABLET ORAL at 21:11

## 2024-01-01 RX ADMIN — LABETALOL HCL 10 MILLIGRAM(S): 300 TABLET, FILM COATED ORAL at 16:40

## 2024-01-01 RX ADMIN — ALBUTEROL SULFATE 2.5 MILLIGRAM(S): 90 INHALANT RESPIRATORY (INHALATION) at 16:30

## 2024-01-01 RX ADMIN — DEXMEDETOMIDINE HYDROCHLORIDE 5.09 MICROGRAM(S)/KG/HR: 4 INJECTION, SOLUTION INTRAVENOUS at 12:54

## 2024-01-01 RX ADMIN — NICARDIPINE HYDROCHLORIDE 25 MG/HR: 2.5 INJECTION INTRAVENOUS at 20:59

## 2024-01-01 RX ADMIN — Medication 1 MILLIGRAM(S): at 11:35

## 2024-01-01 RX ADMIN — CHLORHEXIDINE GLUCONATE 15 MILLILITER(S): 1.2 RINSE ORAL at 05:17

## 2024-01-01 RX ADMIN — FENTANYL 25 MICROGRAM(S): 75 PATCH, EXTENDED RELEASE TRANSDERMAL at 06:00

## 2024-01-01 RX ADMIN — HYDRALAZINE HYDROCHLORIDE 10 MILLIGRAM(S): 10 TABLET ORAL at 11:42

## 2024-01-01 RX ADMIN — Medication 40 MILLIGRAM(S): at 10:30

## 2024-01-01 RX ADMIN — HYDRALAZINE HYDROCHLORIDE 50 MILLIGRAM(S): 10 TABLET ORAL at 06:27

## 2024-01-01 RX ADMIN — ACETAMINOPHEN 400 MILLIGRAM(S): 80 SOLUTION/ DROPS ORAL at 12:05

## 2024-01-01 RX ADMIN — VALPROIC ACID 52.5 MILLIGRAM(S): 250 SOLUTION ORAL at 00:32

## 2024-01-01 RX ADMIN — NICARDIPINE HYDROCHLORIDE 25 MG/HR: 2.5 INJECTION INTRAVENOUS at 23:00

## 2024-01-01 RX ADMIN — VALPROIC ACID 52.5 MILLIGRAM(S): 250 SOLUTION ORAL at 18:12

## 2024-01-01 RX ADMIN — Medication 50 MILLIGRAM(S): at 17:28

## 2024-01-01 RX ADMIN — LABETALOL HCL 10 MILLIGRAM(S): 300 TABLET, FILM COATED ORAL at 10:30

## 2024-01-01 RX ADMIN — ATORVASTATIN CALCIUM 40 MILLIGRAM(S): 40 TABLET, FILM COATED ORAL at 21:23

## 2024-01-01 RX ADMIN — Medication 1 MILLIGRAM(S): at 12:01

## 2024-01-01 RX ADMIN — PIPERACILLIN AND TAZOBACTAM 200 GRAM(S): 3; .375 INJECTION, POWDER, LYOPHILIZED, FOR SOLUTION INTRAVENOUS at 05:40

## 2024-01-01 RX ADMIN — FENTANYL 50 MICROGRAM(S): 75 PATCH, EXTENDED RELEASE TRANSDERMAL at 13:49

## 2024-01-01 RX ADMIN — DEXMEDETOMIDINE HYDROCHLORIDE 5.09 MICROGRAM(S)/KG/HR: 4 INJECTION, SOLUTION INTRAVENOUS at 17:55

## 2024-01-01 RX ADMIN — Medication 25 MILLIGRAM(S): at 13:06

## 2024-01-01 RX ADMIN — VALPROIC ACID 52.5 MILLIGRAM(S): 250 SOLUTION ORAL at 11:18

## 2024-01-01 RX ADMIN — VALPROIC ACID 52.5 MILLIGRAM(S): 250 SOLUTION ORAL at 06:31

## 2024-01-01 RX ADMIN — SENNOSIDES 2 TABLET(S): 8.6 TABLET, FILM COATED ORAL at 22:43

## 2024-01-01 RX ADMIN — NICARDIPINE HYDROCHLORIDE 25 MG/HR: 2.5 INJECTION INTRAVENOUS at 12:53

## 2024-01-01 RX ADMIN — Medication 2: at 11:41

## 2024-01-01 RX ADMIN — Medication 40 MILLIGRAM(S): at 10:54

## 2024-01-01 RX ADMIN — CHLORHEXIDINE GLUCONATE 1 APPLICATION(S): 1.2 RINSE ORAL at 11:41

## 2024-01-01 RX ADMIN — HYDRALAZINE HYDROCHLORIDE 10 MILLIGRAM(S): 10 TABLET ORAL at 18:18

## 2024-01-01 RX ADMIN — HYDRALAZINE HYDROCHLORIDE 100 MILLIGRAM(S): 10 TABLET ORAL at 05:06

## 2024-01-01 RX ADMIN — Medication 25 MILLIGRAM(S): at 17:20

## 2024-01-01 RX ADMIN — VALPROIC ACID 52.5 MILLIGRAM(S): 250 SOLUTION ORAL at 05:30

## 2024-01-01 RX ADMIN — FENTANYL 50 MICROGRAM(S): 75 PATCH, EXTENDED RELEASE TRANSDERMAL at 20:10

## 2024-01-01 RX ADMIN — CHLORHEXIDINE GLUCONATE 15 MILLILITER(S): 1.2 RINSE ORAL at 17:47

## 2024-01-01 RX ADMIN — VALPROIC ACID 52.5 MILLIGRAM(S): 250 SOLUTION ORAL at 18:32

## 2024-01-01 RX ADMIN — LORAZEPAM 1 MILLIGRAM(S): 1 TABLET ORAL at 14:00

## 2024-01-01 RX ADMIN — ALBUTEROL SULFATE 2.5 MILLIGRAM(S): 90 INHALANT RESPIRATORY (INHALATION) at 04:38

## 2024-01-01 RX ADMIN — LABETALOL HCL 10 MILLIGRAM(S): 300 TABLET, FILM COATED ORAL at 05:27

## 2024-01-01 RX ADMIN — LABETALOL HCL 10 MILLIGRAM(S): 300 TABLET, FILM COATED ORAL at 18:25

## 2024-01-01 RX ADMIN — ENOXAPARIN SODIUM 40 MILLIGRAM(S): 60 INJECTION INTRAVENOUS; SUBCUTANEOUS at 21:23

## 2024-01-01 RX ADMIN — HYDRALAZINE HYDROCHLORIDE 100 MILLIGRAM(S): 10 TABLET ORAL at 21:23

## 2024-01-01 RX ADMIN — DEXMEDETOMIDINE HYDROCHLORIDE 5.09 MICROGRAM(S)/KG/HR: 4 INJECTION, SOLUTION INTRAVENOUS at 08:45

## 2024-01-01 RX ADMIN — FENTANYL 50 MICROGRAM(S): 75 PATCH, EXTENDED RELEASE TRANSDERMAL at 13:29

## 2024-01-01 RX ADMIN — Medication 1 MILLIGRAM(S): at 16:48

## 2024-01-01 RX ADMIN — VALPROIC ACID 52.5 MILLIGRAM(S): 250 SOLUTION ORAL at 05:23

## 2024-01-01 RX ADMIN — Medication 2: at 17:22

## 2024-01-01 RX ADMIN — ATORVASTATIN CALCIUM 40 MILLIGRAM(S): 40 TABLET, FILM COATED ORAL at 21:10

## 2024-01-01 RX ADMIN — Medication 60 MILLILITER(S): at 04:00

## 2024-01-01 RX ADMIN — LABETALOL HCL 10 MILLIGRAM(S): 300 TABLET, FILM COATED ORAL at 15:53

## 2024-01-01 RX ADMIN — SENNOSIDES 2 TABLET(S): 8.6 TABLET, FILM COATED ORAL at 21:15

## 2024-01-01 RX ADMIN — LABETALOL HCL 10 MILLIGRAM(S): 300 TABLET, FILM COATED ORAL at 10:26

## 2024-01-01 RX ADMIN — PIPERACILLIN AND TAZOBACTAM 25 GRAM(S): 3; .375 INJECTION, POWDER, LYOPHILIZED, FOR SOLUTION INTRAVENOUS at 04:41

## 2024-01-01 RX ADMIN — HYDRALAZINE HYDROCHLORIDE 10 MILLIGRAM(S): 10 TABLET ORAL at 23:34

## 2024-01-01 RX ADMIN — HYDRALAZINE HYDROCHLORIDE 100 MILLIGRAM(S): 10 TABLET ORAL at 14:35

## 2024-01-01 RX ADMIN — DOXAZOSIN 4 MILLIGRAM(S): 1 TABLET ORAL at 21:43

## 2024-01-01 RX ADMIN — TAMSULOSIN HYDROCHLORIDE 0.4 MILLIGRAM(S): 0.4 CAPSULE ORAL at 01:36

## 2024-01-01 RX ADMIN — Medication 2: at 12:10

## 2024-01-01 RX ADMIN — CHLORHEXIDINE GLUCONATE 15 MILLILITER(S): 1.2 RINSE ORAL at 05:24

## 2024-01-01 RX ADMIN — CARVEDILOL 25 MILLIGRAM(S): 25 TABLET, FILM COATED ORAL at 17:03

## 2024-01-01 RX ADMIN — LORAZEPAM 1 MILLIGRAM(S): 1 TABLET ORAL at 05:24

## 2024-01-01 RX ADMIN — HYDRALAZINE HYDROCHLORIDE 50 MILLIGRAM(S): 10 TABLET ORAL at 05:17

## 2024-01-01 RX ADMIN — PANTOPRAZOLE 40 MILLIGRAM(S): 40 TABLET, DELAYED RELEASE ORAL at 12:10

## 2024-01-01 RX ADMIN — FENTANYL 50 MICROGRAM(S): 75 PATCH, EXTENDED RELEASE TRANSDERMAL at 05:18

## 2024-01-01 RX ADMIN — Medication 4 MILLILITER(S): at 20:33

## 2024-01-01 RX ADMIN — FENTANYL 25 MICROGRAM(S): 75 PATCH, EXTENDED RELEASE TRANSDERMAL at 14:52

## 2024-01-01 RX ADMIN — Medication 1 MILLIGRAM(S): at 11:07

## 2024-01-01 RX ADMIN — FENTANYL 50 MICROGRAM(S): 75 PATCH, EXTENDED RELEASE TRANSDERMAL at 05:45

## 2024-01-01 RX ADMIN — ALBUTEROL SULFATE 2.5 MILLIGRAM(S): 90 INHALANT RESPIRATORY (INHALATION) at 20:33

## 2024-01-01 RX ADMIN — HYDRALAZINE HYDROCHLORIDE 10 MILLIGRAM(S): 10 TABLET ORAL at 02:05

## 2024-01-01 RX ADMIN — LABETALOL HCL 10 MILLIGRAM(S): 300 TABLET, FILM COATED ORAL at 18:33

## 2024-01-01 RX ADMIN — SENNOSIDES 2 TABLET(S): 8.6 TABLET, FILM COATED ORAL at 01:35

## 2024-01-01 RX ADMIN — Medication 2: at 00:18

## 2024-01-01 RX ADMIN — LORAZEPAM 1 MILLIGRAM(S): 1 TABLET ORAL at 16:30

## 2024-01-01 RX ADMIN — PANTOPRAZOLE 40 MILLIGRAM(S): 40 TABLET, DELAYED RELEASE ORAL at 12:29

## 2024-01-01 RX ADMIN — METHYLPREDNISOLONE 125 MILLIGRAM(S): 4 TABLET ORAL at 08:40

## 2024-01-01 RX ADMIN — PIPERACILLIN AND TAZOBACTAM 25 GRAM(S): 3; .375 INJECTION, POWDER, LYOPHILIZED, FOR SOLUTION INTRAVENOUS at 20:04

## 2024-01-01 RX ADMIN — Medication 50 MILLIGRAM(S): at 17:22

## 2024-01-01 RX ADMIN — CEFTRIAXONE SODIUM 1000 MILLIGRAM(S): 1 INJECTION, POWDER, FOR SOLUTION INTRAMUSCULAR; INTRAVENOUS at 12:39

## 2024-01-01 RX ADMIN — DEXMEDETOMIDINE HYDROCHLORIDE 5.09 MICROGRAM(S)/KG/HR: 4 INJECTION, SOLUTION INTRAVENOUS at 19:46

## 2024-01-01 RX ADMIN — FENTANYL 25 MICROGRAM(S): 75 PATCH, EXTENDED RELEASE TRANSDERMAL at 11:50

## 2024-01-01 RX ADMIN — MIDAZOLAM HYDROCHLORIDE 2 MILLIGRAM(S): 1 INJECTION INTRAMUSCULAR; INTRAVENOUS at 02:50

## 2024-01-01 RX ADMIN — Medication 1 MILLIGRAM(S): at 12:04

## 2024-01-01 RX ADMIN — Medication 50 MILLILITER(S): at 23:45

## 2024-01-01 RX ADMIN — THIAMINE HYDROCHLORIDE 105 MILLIGRAM(S): 100 INJECTION, SOLUTION INTRAMUSCULAR; INTRAVENOUS at 14:25

## 2024-01-01 RX ADMIN — Medication 1: at 11:24

## 2024-01-01 RX ADMIN — Medication 40 MILLIGRAM(S): at 12:39

## 2024-01-01 RX ADMIN — HYDRALAZINE HYDROCHLORIDE 10 MILLIGRAM(S): 10 TABLET ORAL at 10:43

## 2024-01-01 RX ADMIN — DEXMEDETOMIDINE HYDROCHLORIDE 5.09 MICROGRAM(S)/KG/HR: 4 INJECTION, SOLUTION INTRAVENOUS at 02:45

## 2024-01-01 RX ADMIN — PROPOFOL 6.1 MICROGRAM(S)/KG/MIN: 10 INJECTION, EMULSION INTRAVENOUS at 02:43

## 2024-01-01 RX ADMIN — MIDAZOLAM HYDROCHLORIDE 2 MILLIGRAM(S): 1 INJECTION INTRAMUSCULAR; INTRAVENOUS at 08:37

## 2024-01-01 RX ADMIN — FENTANYL 50 MICROGRAM(S): 75 PATCH, EXTENDED RELEASE TRANSDERMAL at 08:25

## 2024-01-01 RX ADMIN — NICARDIPINE HYDROCHLORIDE 25 MG/HR: 2.5 INJECTION INTRAVENOUS at 07:10

## 2024-01-01 RX ADMIN — ENOXAPARIN SODIUM 40 MILLIGRAM(S): 60 INJECTION INTRAVENOUS; SUBCUTANEOUS at 22:43

## 2024-01-01 RX ADMIN — POTASSIUM CHLORIDE 20 MILLIEQUIVALENT(S): 600 TABLET, FILM COATED, EXTENDED RELEASE ORAL at 06:31

## 2024-01-01 RX ADMIN — Medication 4 MILLILITER(S): at 16:50

## 2024-01-01 RX ADMIN — Medication 25 MILLIGRAM(S): at 05:23

## 2024-01-01 RX ADMIN — VALSARTAN 160 MILLIGRAM(S): 80 TABLET ORAL at 05:17

## 2024-01-01 RX ADMIN — LORAZEPAM 1 MILLIGRAM(S): 1 TABLET ORAL at 20:35

## 2024-01-01 RX ADMIN — Medication 1: at 12:01

## 2024-01-01 RX ADMIN — Medication 1: at 01:57

## 2024-01-01 RX ADMIN — CHLORHEXIDINE GLUCONATE 15 MILLILITER(S): 1.2 RINSE ORAL at 05:40

## 2024-01-01 RX ADMIN — LABETALOL HCL 10 MILLIGRAM(S): 300 TABLET, FILM COATED ORAL at 19:32

## 2024-01-01 RX ADMIN — NICARDIPINE HYDROCHLORIDE 25 MG/HR: 2.5 INJECTION INTRAVENOUS at 17:50

## 2024-01-01 RX ADMIN — Medication 1: at 05:14

## 2024-01-01 RX ADMIN — CHLORHEXIDINE GLUCONATE 1 APPLICATION(S): 1.2 RINSE ORAL at 11:08

## 2024-01-01 RX ADMIN — DOXAZOSIN 4 MILLIGRAM(S): 1 TABLET ORAL at 21:23

## 2024-01-01 RX ADMIN — DEXMEDETOMIDINE HYDROCHLORIDE 5.09 MICROGRAM(S)/KG/HR: 4 INJECTION, SOLUTION INTRAVENOUS at 17:22

## 2024-01-01 RX ADMIN — ACETAMINOPHEN 400 MILLIGRAM(S): 80 SOLUTION/ DROPS ORAL at 15:56

## 2024-01-01 RX ADMIN — NICARDIPINE HYDROCHLORIDE 25 MG/HR: 2.5 INJECTION INTRAVENOUS at 04:45

## 2024-01-01 RX ADMIN — HYDRALAZINE HYDROCHLORIDE 100 MILLIGRAM(S): 10 TABLET ORAL at 21:57

## 2024-01-01 RX ADMIN — CHLORHEXIDINE GLUCONATE 1 APPLICATION(S): 1.2 RINSE ORAL at 05:33

## 2024-01-01 RX ADMIN — CARVEDILOL 25 MILLIGRAM(S): 25 TABLET, FILM COATED ORAL at 06:27

## 2024-01-01 RX ADMIN — FENTANYL 50 MICROGRAM(S): 75 PATCH, EXTENDED RELEASE TRANSDERMAL at 04:15

## 2024-01-01 RX ADMIN — THIAMINE HYDROCHLORIDE 105 MILLIGRAM(S): 100 INJECTION, SOLUTION INTRAMUSCULAR; INTRAVENOUS at 05:25

## 2024-01-01 RX ADMIN — ALBUTEROL SULFATE 2.5 MILLIGRAM(S): 90 INHALANT RESPIRATORY (INHALATION) at 16:50

## 2024-01-01 RX ADMIN — PROPOFOL 6.1 MICROGRAM(S)/KG/MIN: 10 INJECTION, EMULSION INTRAVENOUS at 17:16

## 2024-01-01 RX ADMIN — ETOMIDATE 20 MILLIGRAM(S): 40 INJECTION, SOLUTION INTRAVENOUS at 08:45

## 2024-01-01 RX ADMIN — Medication 25 MILLIGRAM(S): at 06:32

## 2024-01-01 RX ADMIN — ALBUTEROL SULFATE 2.5 MILLIGRAM(S): 90 INHALANT RESPIRATORY (INHALATION) at 14:43

## 2024-01-01 RX ADMIN — DOXAZOSIN 4 MILLIGRAM(S): 1 TABLET ORAL at 21:29

## 2024-01-01 RX ADMIN — DEXMEDETOMIDINE HYDROCHLORIDE 5.09 MICROGRAM(S)/KG/HR: 4 INJECTION, SOLUTION INTRAVENOUS at 10:01

## 2024-01-01 RX ADMIN — Medication 1 TABLET(S): at 11:36

## 2024-01-01 RX ADMIN — SENNOSIDES 2 TABLET(S): 8.6 TABLET, FILM COATED ORAL at 21:23

## 2024-01-01 RX ADMIN — FENTANYL 25 MICROGRAM(S): 75 PATCH, EXTENDED RELEASE TRANSDERMAL at 05:45

## 2024-01-01 RX ADMIN — ENOXAPARIN SODIUM 40 MILLIGRAM(S): 60 INJECTION INTRAVENOUS; SUBCUTANEOUS at 22:30

## 2024-01-01 RX ADMIN — PANTOPRAZOLE 40 MILLIGRAM(S): 40 TABLET, DELAYED RELEASE ORAL at 12:03

## 2024-01-01 RX ADMIN — Medication 60 MILLILITER(S): at 12:55

## 2024-01-01 RX ADMIN — LABETALOL HCL 10 MILLIGRAM(S): 300 TABLET, FILM COATED ORAL at 15:11

## 2024-01-01 RX ADMIN — FENTANYL 50 MICROGRAM(S): 75 PATCH, EXTENDED RELEASE TRANSDERMAL at 13:46

## 2024-01-01 RX ADMIN — Medication 1: at 07:01

## 2024-01-01 RX ADMIN — Medication 1: at 23:57

## 2024-01-01 RX ADMIN — CEFTRIAXONE SODIUM 1000 MILLIGRAM(S): 1 INJECTION, POWDER, FOR SOLUTION INTRAMUSCULAR; INTRAVENOUS at 13:06

## 2024-01-01 RX ADMIN — CARVEDILOL 25 MILLIGRAM(S): 25 TABLET, FILM COATED ORAL at 21:15

## 2024-01-01 RX ADMIN — Medication 50 MILLIGRAM(S): at 06:32

## 2024-01-01 RX ADMIN — Medication 50 MILLIGRAM(S): at 13:47

## 2024-01-01 RX ADMIN — CHLORHEXIDINE GLUCONATE 15 MILLILITER(S): 1.2 RINSE ORAL at 05:33

## 2024-01-01 RX ADMIN — CHLORHEXIDINE GLUCONATE 15 MILLILITER(S): 1.2 RINSE ORAL at 06:31

## 2024-01-01 RX ADMIN — Medication 10 MILLIGRAM(S): at 06:27

## 2024-01-01 RX ADMIN — Medication 50 MILLILITER(S): at 09:36

## 2024-01-01 RX ADMIN — Medication 1 MILLIGRAM(S): at 11:01

## 2024-01-01 RX ADMIN — SODIUM CHLORIDE 75 MILLILITER(S): 9 INJECTION, SOLUTION INTRAMUSCULAR; INTRAVENOUS; SUBCUTANEOUS at 05:39

## 2024-01-01 RX ADMIN — NICARDIPINE HYDROCHLORIDE 25 MG/HR: 2.5 INJECTION INTRAVENOUS at 13:30

## 2024-01-01 RX ADMIN — CHLORHEXIDINE GLUCONATE 15 MILLILITER(S): 1.2 RINSE ORAL at 17:04

## 2024-01-01 RX ADMIN — CHLORHEXIDINE GLUCONATE 15 MILLILITER(S): 1.2 RINSE ORAL at 17:19

## 2024-01-01 RX ADMIN — Medication 1: at 17:31

## 2024-01-01 RX ADMIN — VALPROIC ACID 52.5 MILLIGRAM(S): 250 SOLUTION ORAL at 17:19

## 2024-01-01 RX ADMIN — Medication 4 MILLILITER(S): at 08:32

## 2024-01-01 RX ADMIN — VALSARTAN 160 MILLIGRAM(S): 80 TABLET ORAL at 05:57

## 2024-01-01 RX ADMIN — PANTOPRAZOLE 40 MILLIGRAM(S): 40 TABLET, DELAYED RELEASE ORAL at 11:36

## 2024-01-01 RX ADMIN — FENTANYL 50 MICROGRAM(S): 75 PATCH, EXTENDED RELEASE TRANSDERMAL at 18:34

## 2024-01-01 RX ADMIN — FENTANYL 50 MICROGRAM(S): 75 PATCH, EXTENDED RELEASE TRANSDERMAL at 13:27

## 2024-01-01 RX ADMIN — ALBUTEROL SULFATE 2.5 MILLIGRAM(S): 90 INHALANT RESPIRATORY (INHALATION) at 20:59

## 2024-01-01 RX ADMIN — Medication 4 MILLILITER(S): at 04:38

## 2024-01-01 RX ADMIN — Medication 1: at 01:34

## 2024-01-01 RX ADMIN — SODIUM CHLORIDE 75 MILLILITER(S): 9 INJECTION, SOLUTION INTRAMUSCULAR; INTRAVENOUS; SUBCUTANEOUS at 19:31

## 2024-01-01 RX ADMIN — ONDANSETRON 4 MILLIGRAM(S): 4 TABLET ORAL at 12:29

## 2024-01-01 RX ADMIN — Medication 2: at 05:58

## 2024-01-01 RX ADMIN — PANTOPRAZOLE 40 MILLIGRAM(S): 40 TABLET, DELAYED RELEASE ORAL at 08:26

## 2024-01-01 RX ADMIN — ALBUTEROL SULFATE 2.5 MILLIGRAM(S): 90 INHALANT RESPIRATORY (INHALATION) at 05:01

## 2024-01-01 RX ADMIN — VALPROIC ACID 52.5 MILLIGRAM(S): 250 SOLUTION ORAL at 23:36

## 2024-01-01 RX ADMIN — HYDRALAZINE HYDROCHLORIDE 10 MILLIGRAM(S): 10 TABLET ORAL at 02:02

## 2024-01-01 RX ADMIN — FENTANYL 50 MICROGRAM(S): 75 PATCH, EXTENDED RELEASE TRANSDERMAL at 13:50

## 2024-01-01 RX ADMIN — HYDRALAZINE HYDROCHLORIDE 10 MILLIGRAM(S): 10 TABLET ORAL at 19:26

## 2024-01-01 RX ADMIN — FENTANYL 50 MICROGRAM(S): 75 PATCH, EXTENDED RELEASE TRANSDERMAL at 03:45

## 2024-01-01 RX ADMIN — VALSARTAN 80 MILLIGRAM(S): 80 TABLET ORAL at 12:10

## 2024-01-01 RX ADMIN — CHLORHEXIDINE GLUCONATE 15 MILLILITER(S): 1.2 RINSE ORAL at 05:13

## 2024-01-01 RX ADMIN — ATORVASTATIN CALCIUM 40 MILLIGRAM(S): 40 TABLET, FILM COATED ORAL at 21:30

## 2024-01-01 RX ADMIN — Medication 10 MILLIGRAM(S): at 05:23

## 2024-01-01 RX ADMIN — MAGNESIUM SULFATE 100 GRAM(S): 500 INJECTION, SOLUTION INTRAMUSCULAR; INTRAVENOUS at 03:57

## 2024-01-01 RX ADMIN — Medication 1: at 11:15

## 2024-01-01 RX ADMIN — PANTOPRAZOLE 40 MILLIGRAM(S): 40 TABLET, DELAYED RELEASE ORAL at 11:17

## 2024-01-01 RX ADMIN — PANTOPRAZOLE 40 MILLIGRAM(S): 40 TABLET, DELAYED RELEASE ORAL at 11:38

## 2024-01-01 RX ADMIN — PIPERACILLIN AND TAZOBACTAM 25 GRAM(S): 3; .375 INJECTION, POWDER, LYOPHILIZED, FOR SOLUTION INTRAVENOUS at 05:16

## 2024-01-01 RX ADMIN — THIAMINE HYDROCHLORIDE 105 MILLIGRAM(S): 100 INJECTION, SOLUTION INTRAMUSCULAR; INTRAVENOUS at 06:30

## 2024-01-01 RX ADMIN — VALSARTAN 160 MILLIGRAM(S): 80 TABLET ORAL at 06:27

## 2024-01-01 RX ADMIN — CHLORHEXIDINE GLUCONATE 15 MILLILITER(S): 1.2 RINSE ORAL at 16:53

## 2024-01-01 RX ADMIN — DEXMEDETOMIDINE HYDROCHLORIDE 5.09 MICROGRAM(S)/KG/HR: 4 INJECTION, SOLUTION INTRAVENOUS at 08:27

## 2024-01-01 RX ADMIN — FENTANYL 50 MICROGRAM(S): 75 PATCH, EXTENDED RELEASE TRANSDERMAL at 03:34

## 2024-01-01 RX ADMIN — Medication 25 MILLIGRAM(S): at 10:03

## 2024-01-01 RX ADMIN — PROPOFOL 30 MILLIGRAM(S): 10 INJECTION, EMULSION INTRAVENOUS at 08:50

## 2024-01-01 RX ADMIN — PIPERACILLIN AND TAZOBACTAM 25 GRAM(S): 3; .375 INJECTION, POWDER, LYOPHILIZED, FOR SOLUTION INTRAVENOUS at 20:10

## 2024-01-01 RX ADMIN — Medication 4 MILLILITER(S): at 21:01

## 2024-01-01 RX ADMIN — Medication 130 MILLIGRAM(S): at 05:58

## 2024-01-01 RX ADMIN — LABETALOL HCL 10 MILLIGRAM(S): 300 TABLET, FILM COATED ORAL at 23:08

## 2024-01-01 RX ADMIN — PANTOPRAZOLE 40 MILLIGRAM(S): 40 TABLET, DELAYED RELEASE ORAL at 11:22

## 2024-01-01 RX ADMIN — ACETAMINOPHEN 1000 MILLIGRAM(S): 80 SOLUTION/ DROPS ORAL at 17:03

## 2024-01-01 RX ADMIN — HYDRALAZINE HYDROCHLORIDE 50 MILLIGRAM(S): 10 TABLET ORAL at 13:07

## 2024-01-01 RX ADMIN — VALPROIC ACID 52.5 MILLIGRAM(S): 250 SOLUTION ORAL at 12:49

## 2024-01-01 RX ADMIN — NICARDIPINE HYDROCHLORIDE 25 MG/HR: 2.5 INJECTION INTRAVENOUS at 16:53

## 2024-01-01 RX ADMIN — Medication 5 MILLIGRAM(S): at 22:26

## 2024-01-01 RX ADMIN — FENTANYL 25 MICROGRAM(S): 75 PATCH, EXTENDED RELEASE TRANSDERMAL at 01:30

## 2024-01-01 RX ADMIN — VALPROIC ACID 52.5 MILLIGRAM(S): 250 SOLUTION ORAL at 23:52

## 2024-01-01 RX ADMIN — THIAMINE HYDROCHLORIDE 105 MILLIGRAM(S): 100 INJECTION, SOLUTION INTRAMUSCULAR; INTRAVENOUS at 16:48

## 2024-01-01 RX ADMIN — NICARDIPINE HYDROCHLORIDE 25 MG/HR: 2.5 INJECTION INTRAVENOUS at 10:54

## 2024-01-01 RX ADMIN — DOXAZOSIN 4 MILLIGRAM(S): 1 TABLET ORAL at 21:36

## 2024-01-01 RX ADMIN — LABETALOL HCL 10 MILLIGRAM(S): 300 TABLET, FILM COATED ORAL at 11:15

## 2024-01-01 RX ADMIN — NICARDIPINE HYDROCHLORIDE 25 MG/HR: 2.5 INJECTION INTRAVENOUS at 00:48

## 2024-01-01 RX ADMIN — VALPROIC ACID 52.5 MILLIGRAM(S): 250 SOLUTION ORAL at 12:04

## 2024-01-01 RX ADMIN — FENTANYL 25 MICROGRAM(S): 75 PATCH, EXTENDED RELEASE TRANSDERMAL at 01:07

## 2024-01-01 RX ADMIN — FENTANYL 25 MICROGRAM(S): 75 PATCH, EXTENDED RELEASE TRANSDERMAL at 11:36

## 2024-01-01 RX ADMIN — ALBUTEROL SULFATE 2.5 MILLIGRAM(S): 90 INHALANT RESPIRATORY (INHALATION) at 15:51

## 2024-01-01 RX ADMIN — Medication 1: at 11:10

## 2024-01-01 RX ADMIN — CHLORHEXIDINE GLUCONATE 15 MILLILITER(S): 1.2 RINSE ORAL at 06:27

## 2024-01-01 RX ADMIN — HYDRALAZINE HYDROCHLORIDE 100 MILLIGRAM(S): 10 TABLET ORAL at 05:16

## 2024-01-01 RX ADMIN — Medication 4 MILLILITER(S): at 14:42

## 2024-01-01 RX ADMIN — PIPERACILLIN AND TAZOBACTAM 25 GRAM(S): 3; .375 INJECTION, POWDER, LYOPHILIZED, FOR SOLUTION INTRAVENOUS at 03:40

## 2024-01-01 RX ADMIN — HYDRALAZINE HYDROCHLORIDE 10 MILLIGRAM(S): 10 TABLET ORAL at 12:06

## 2024-01-01 RX ADMIN — Medication 17 GRAM(S): at 12:10

## 2024-01-01 RX ADMIN — HEPARIN SODIUM 5000 UNIT(S): 1000 INJECTION, SOLUTION INTRAVENOUS; SUBCUTANEOUS at 21:36

## 2024-01-01 RX ADMIN — FENTANYL 25 MICROGRAM(S): 75 PATCH, EXTENDED RELEASE TRANSDERMAL at 23:00

## 2024-01-01 RX ADMIN — Medication 25 MILLIGRAM(S): at 05:18

## 2024-01-01 RX ADMIN — DEXMEDETOMIDINE HYDROCHLORIDE 5.09 MICROGRAM(S)/KG/HR: 4 INJECTION, SOLUTION INTRAVENOUS at 08:38

## 2024-01-01 RX ADMIN — Medication 1: at 12:35

## 2024-01-01 RX ADMIN — Medication 2 MILLIGRAM(S): at 00:26

## 2024-01-01 RX ADMIN — HYDRALAZINE HYDROCHLORIDE 10 MILLIGRAM(S): 10 TABLET ORAL at 11:34

## 2024-01-01 RX ADMIN — FENTANYL 50 MICROGRAM(S): 75 PATCH, EXTENDED RELEASE TRANSDERMAL at 20:57

## 2024-01-01 RX ADMIN — CHLORHEXIDINE GLUCONATE 15 MILLILITER(S): 1.2 RINSE ORAL at 05:04

## 2024-01-01 RX ADMIN — ENOXAPARIN SODIUM 40 MILLIGRAM(S): 60 INJECTION INTRAVENOUS; SUBCUTANEOUS at 21:32

## 2024-01-01 RX ADMIN — ATORVASTATIN CALCIUM 40 MILLIGRAM(S): 40 TABLET, FILM COATED ORAL at 21:34

## 2024-01-01 RX ADMIN — ALBUTEROL SULFATE 2.5 MILLIGRAM(S): 90 INHALANT RESPIRATORY (INHALATION) at 20:09

## 2024-01-01 RX ADMIN — PIPERACILLIN AND TAZOBACTAM 25 GRAM(S): 3; .375 INJECTION, POWDER, LYOPHILIZED, FOR SOLUTION INTRAVENOUS at 15:57

## 2024-01-01 RX ADMIN — Medication 25 MILLIGRAM(S): at 17:15

## 2024-01-01 RX ADMIN — THIAMINE HYDROCHLORIDE 105 MILLIGRAM(S): 100 INJECTION, SOLUTION INTRAMUSCULAR; INTRAVENOUS at 22:30

## 2024-01-01 RX ADMIN — PANTOPRAZOLE 40 MILLIGRAM(S): 40 TABLET, DELAYED RELEASE ORAL at 11:01

## 2024-01-01 RX ADMIN — Medication 1: at 07:15

## 2024-01-01 RX ADMIN — DOXAZOSIN 4 MILLIGRAM(S): 1 TABLET ORAL at 21:15

## 2024-01-01 RX ADMIN — Medication 10 MILLIGRAM(S): at 05:06

## 2024-01-01 RX ADMIN — Medication 2000 MILLIGRAM(S): at 05:04

## 2024-01-01 RX ADMIN — SENNOSIDES 2 TABLET(S): 8.6 TABLET, FILM COATED ORAL at 21:43

## 2024-01-01 RX ADMIN — CHLORHEXIDINE GLUCONATE 15 MILLILITER(S): 1.2 RINSE ORAL at 17:15

## 2024-01-01 RX ADMIN — ATORVASTATIN CALCIUM 40 MILLIGRAM(S): 40 TABLET, FILM COATED ORAL at 21:43

## 2024-01-01 RX ADMIN — ALBUTEROL SULFATE 2.5 MILLIGRAM(S): 90 INHALANT RESPIRATORY (INHALATION) at 02:14

## 2024-01-01 RX ADMIN — Medication 10 MILLIGRAM(S): at 11:17

## 2024-01-01 RX ADMIN — PIPERACILLIN AND TAZOBACTAM 25 GRAM(S): 3; .375 INJECTION, POWDER, LYOPHILIZED, FOR SOLUTION INTRAVENOUS at 04:50

## 2024-01-01 RX ADMIN — ACETAMINOPHEN 400 MILLIGRAM(S): 80 SOLUTION/ DROPS ORAL at 02:43

## 2024-01-01 RX ADMIN — LABETALOL HCL 10 MILLIGRAM(S): 300 TABLET, FILM COATED ORAL at 05:02

## 2024-01-01 RX ADMIN — CHLORHEXIDINE GLUCONATE 15 MILLILITER(S): 1.2 RINSE ORAL at 05:06

## 2024-01-01 RX ADMIN — Medication 4 MILLILITER(S): at 20:55

## 2024-01-01 RX ADMIN — CARVEDILOL 25 MILLIGRAM(S): 25 TABLET, FILM COATED ORAL at 05:06

## 2024-01-01 RX ADMIN — DEXMEDETOMIDINE HYDROCHLORIDE 5.09 MICROGRAM(S)/KG/HR: 4 INJECTION, SOLUTION INTRAVENOUS at 18:16

## 2024-01-01 RX ADMIN — ATORVASTATIN CALCIUM 40 MILLIGRAM(S): 40 TABLET, FILM COATED ORAL at 21:35

## 2024-01-01 RX ADMIN — Medication 50 MILLIGRAM(S): at 05:13

## 2024-01-01 RX ADMIN — NICARDIPINE HYDROCHLORIDE 25 MG/HR: 2.5 INJECTION INTRAVENOUS at 05:06

## 2024-01-01 RX ADMIN — ACETAMINOPHEN 400 MILLIGRAM(S): 80 SOLUTION/ DROPS ORAL at 16:53

## 2024-01-01 RX ADMIN — DEXMEDETOMIDINE HYDROCHLORIDE 5.09 MICROGRAM(S)/KG/HR: 4 INJECTION, SOLUTION INTRAVENOUS at 13:08

## 2024-01-01 RX ADMIN — Medication 25 MILLIGRAM(S): at 21:11

## 2024-01-01 RX ADMIN — PIPERACILLIN AND TAZOBACTAM 25 GRAM(S): 3; .375 INJECTION, POWDER, LYOPHILIZED, FOR SOLUTION INTRAVENOUS at 11:07

## 2024-01-01 RX ADMIN — PIPERACILLIN AND TAZOBACTAM 25 GRAM(S): 3; .375 INJECTION, POWDER, LYOPHILIZED, FOR SOLUTION INTRAVENOUS at 19:32

## 2024-01-01 RX ADMIN — Medication 50 MILLIGRAM(S): at 17:20

## 2024-01-01 RX ADMIN — Medication 1: at 23:10

## 2024-01-01 RX ADMIN — LABETALOL HCL 10 MILLIGRAM(S): 300 TABLET, FILM COATED ORAL at 02:02

## 2024-01-01 RX ADMIN — VALSARTAN 80 MILLIGRAM(S): 80 TABLET ORAL at 17:22

## 2024-01-01 RX ADMIN — LABETALOL HCL 10 MILLIGRAM(S): 300 TABLET, FILM COATED ORAL at 04:46

## 2024-01-01 RX ADMIN — LORAZEPAM 0.5 MILLIGRAM(S): 1 TABLET ORAL at 12:29

## 2024-01-01 RX ADMIN — DOXAZOSIN 4 MILLIGRAM(S): 1 TABLET ORAL at 21:57

## 2024-01-01 RX ADMIN — VALPROIC ACID 52.5 MILLIGRAM(S): 250 SOLUTION ORAL at 12:00

## 2024-01-01 RX ADMIN — FENTANYL 50 MICROGRAM(S): 75 PATCH, EXTENDED RELEASE TRANSDERMAL at 19:49

## 2024-01-01 RX ADMIN — HYDRALAZINE HYDROCHLORIDE 10 MILLIGRAM(S): 10 TABLET ORAL at 15:28

## 2024-01-01 RX ADMIN — DOXAZOSIN 4 MILLIGRAM(S): 1 TABLET ORAL at 21:34

## 2024-01-01 RX ADMIN — Medication 1 MILLIGRAM(S): at 08:26

## 2024-01-01 RX ADMIN — PIPERACILLIN AND TAZOBACTAM 25 GRAM(S): 3; .375 INJECTION, POWDER, LYOPHILIZED, FOR SOLUTION INTRAVENOUS at 11:06

## 2024-01-01 RX ADMIN — HYDRALAZINE HYDROCHLORIDE 100 MILLIGRAM(S): 10 TABLET ORAL at 13:27

## 2024-01-01 RX ADMIN — Medication 50 MILLIGRAM(S): at 23:38

## 2024-01-01 RX ADMIN — ALBUTEROL SULFATE 2.5 MILLIGRAM(S): 90 INHALANT RESPIRATORY (INHALATION) at 09:34

## 2024-01-01 RX ADMIN — LABETALOL HCL 10 MILLIGRAM(S): 300 TABLET, FILM COATED ORAL at 10:55

## 2024-01-01 RX ADMIN — Medication 4: at 17:05

## 2024-01-01 RX ADMIN — Medication 50 MILLILITER(S): at 10:54

## 2024-01-01 RX ADMIN — Medication 1 PACKET(S): at 05:40

## 2024-01-01 RX ADMIN — Medication 1 TABLET(S): at 11:22

## 2024-01-01 RX ADMIN — HEPARIN SODIUM 5000 UNIT(S): 1000 INJECTION, SOLUTION INTRAVENOUS; SUBCUTANEOUS at 05:16

## 2024-01-01 RX ADMIN — Medication 4 MILLILITER(S): at 09:13

## 2024-01-01 RX ADMIN — THIAMINE HYDROCHLORIDE 105 MILLIGRAM(S): 100 INJECTION, SOLUTION INTRAMUSCULAR; INTRAVENOUS at 15:25

## 2024-01-01 RX ADMIN — Medication 2: at 17:04

## 2024-01-01 RX ADMIN — ACETAMINOPHEN 1000 MILLIGRAM(S): 80 SOLUTION/ DROPS ORAL at 03:30

## 2024-01-01 RX ADMIN — Medication 5 MILLIGRAM(S): at 05:23

## 2024-01-01 RX ADMIN — LEVETIRACETAM 400 MILLIGRAM(S): 100 SOLUTION ORAL at 10:54

## 2024-01-01 RX ADMIN — Medication 1 MILLIGRAM(S): at 11:18

## 2024-01-01 RX ADMIN — SODIUM CHLORIDE 500 MILLILITER(S): 9 INJECTION, SOLUTION INTRAVENOUS at 13:07

## 2024-01-01 RX ADMIN — ATORVASTATIN CALCIUM 40 MILLIGRAM(S): 40 TABLET, FILM COATED ORAL at 22:43

## 2024-01-01 RX ADMIN — LORAZEPAM 1 MILLIGRAM(S): 1 TABLET ORAL at 08:22

## 2024-01-01 RX ADMIN — LABETALOL HCL 10 MILLIGRAM(S): 300 TABLET, FILM COATED ORAL at 19:44

## 2024-01-01 RX ADMIN — Medication 130 MILLIGRAM(S): at 17:00

## 2024-01-01 RX ADMIN — Medication 5 MILLIGRAM(S): at 21:30

## 2024-01-01 RX ADMIN — FENTANYL 50 MICROGRAM(S): 75 PATCH, EXTENDED RELEASE TRANSDERMAL at 20:12

## 2024-01-01 RX ADMIN — Medication 10 MILLIGRAM(S): at 05:16

## 2024-01-01 RX ADMIN — Medication 2 MILLIGRAM(S): at 01:00

## 2024-01-01 RX ADMIN — Medication 25 MILLIGRAM(S): at 05:16

## 2024-01-01 RX ADMIN — ENOXAPARIN SODIUM 40 MILLIGRAM(S): 60 INJECTION INTRAVENOUS; SUBCUTANEOUS at 23:20

## 2024-01-01 RX ADMIN — VALPROIC ACID 52.5 MILLIGRAM(S): 250 SOLUTION ORAL at 11:06

## 2024-01-01 RX ADMIN — Medication 1: at 17:25

## 2024-01-01 RX ADMIN — PROPOFOL 6.1 MICROGRAM(S)/KG/MIN: 10 INJECTION, EMULSION INTRAVENOUS at 09:05

## 2024-01-01 RX ADMIN — PIPERACILLIN AND TAZOBACTAM 25 GRAM(S): 3; .375 INJECTION, POWDER, LYOPHILIZED, FOR SOLUTION INTRAVENOUS at 11:17

## 2024-01-01 RX ADMIN — Medication 1 TABLET(S): at 11:01

## 2024-01-01 RX ADMIN — HEPARIN SODIUM 5000 UNIT(S): 1000 INJECTION, SOLUTION INTRAVENOUS; SUBCUTANEOUS at 13:46

## 2024-01-01 RX ADMIN — PANTOPRAZOLE 40 MILLIGRAM(S): 40 TABLET, DELAYED RELEASE ORAL at 11:07

## 2024-01-01 RX ADMIN — Medication 25 MILLIGRAM(S): at 13:46

## 2024-01-01 RX ADMIN — ACETAMINOPHEN 1000 MILLIGRAM(S): 80 SOLUTION/ DROPS ORAL at 13:05

## 2024-01-01 RX ADMIN — HYDRALAZINE HYDROCHLORIDE 100 MILLIGRAM(S): 10 TABLET ORAL at 21:43

## 2024-01-01 RX ADMIN — Medication 4 MILLILITER(S): at 16:30

## 2024-01-01 RX ADMIN — DEXMEDETOMIDINE HYDROCHLORIDE 5.09 MICROGRAM(S)/KG/HR: 4 INJECTION, SOLUTION INTRAVENOUS at 22:30

## 2024-01-01 RX ADMIN — SODIUM CHLORIDE 500 MILLILITER(S): 9 INJECTION, SOLUTION INTRAMUSCULAR; INTRAVENOUS; SUBCUTANEOUS at 13:00

## 2024-01-01 RX ADMIN — Medication 50 MILLIGRAM(S): at 23:52

## 2024-01-01 RX ADMIN — Medication 4 MILLILITER(S): at 02:14

## 2024-01-01 RX ADMIN — MIDAZOLAM HYDROCHLORIDE 2 MILLIGRAM(S): 1 INJECTION INTRAMUSCULAR; INTRAVENOUS at 06:45

## 2024-01-01 RX ADMIN — CHLORHEXIDINE GLUCONATE 1 APPLICATION(S): 1.2 RINSE ORAL at 12:12

## 2024-01-01 RX ADMIN — VALPROIC ACID 52.5 MILLIGRAM(S): 250 SOLUTION ORAL at 06:28

## 2024-01-01 RX ADMIN — FENTANYL 25 MICROGRAM(S): 75 PATCH, EXTENDED RELEASE TRANSDERMAL at 17:32

## 2024-01-01 RX ADMIN — ACETAMINOPHEN 1000 MILLIGRAM(S): 80 SOLUTION/ DROPS ORAL at 17:20

## 2024-01-01 RX ADMIN — CARVEDILOL 25 MILLIGRAM(S): 25 TABLET, FILM COATED ORAL at 05:16

## 2024-01-01 RX ADMIN — PROTHROMBIN COMPLEX CONCENTRATE (HUMAN) 400 INTERNATIONAL UNIT(S): 25.5; 16.5; 24; 22; 22; 26 POWDER, FOR SOLUTION INTRAVENOUS at 17:50

## 2024-01-01 RX ADMIN — LABETALOL HCL 10 MILLIGRAM(S): 300 TABLET, FILM COATED ORAL at 15:45

## 2024-01-01 RX ADMIN — VALPROIC ACID 52.5 MILLIGRAM(S): 250 SOLUTION ORAL at 00:48

## 2024-01-01 RX ADMIN — FENTANYL 50 MICROGRAM(S): 75 PATCH, EXTENDED RELEASE TRANSDERMAL at 13:40

## 2024-01-01 RX ADMIN — HYDRALAZINE HYDROCHLORIDE 10 MILLIGRAM(S): 10 TABLET ORAL at 15:33

## 2024-01-01 RX ADMIN — CHLORHEXIDINE GLUCONATE 1 APPLICATION(S): 1.2 RINSE ORAL at 12:31

## 2024-01-01 RX ADMIN — VALPROIC ACID 52.5 MILLIGRAM(S): 250 SOLUTION ORAL at 17:15

## 2024-01-01 RX ADMIN — Medication 4 MILLILITER(S): at 10:00

## 2024-01-01 RX ADMIN — PIPERACILLIN AND TAZOBACTAM 200 GRAM(S): 3; .375 INJECTION, POWDER, LYOPHILIZED, FOR SOLUTION INTRAVENOUS at 12:40

## 2024-01-01 RX ADMIN — ALBUTEROL SULFATE 2.5 MILLIGRAM(S): 90 INHALANT RESPIRATORY (INHALATION) at 09:15

## 2024-01-01 RX ADMIN — PIPERACILLIN AND TAZOBACTAM 25 GRAM(S): 3; .375 INJECTION, POWDER, LYOPHILIZED, FOR SOLUTION INTRAVENOUS at 21:57

## 2024-01-01 RX ADMIN — SENNOSIDES 2 TABLET(S): 8.6 TABLET, FILM COATED ORAL at 01:18

## 2024-01-01 RX ADMIN — ALBUTEROL SULFATE 2.5 MILLIGRAM(S): 90 INHALANT RESPIRATORY (INHALATION) at 08:33

## 2024-01-01 RX ADMIN — LEVETIRACETAM 750 MILLIGRAM(S): 100 SOLUTION ORAL at 18:37

## 2024-01-01 RX ADMIN — HEPARIN SODIUM 5000 UNIT(S): 1000 INJECTION, SOLUTION INTRAVENOUS; SUBCUTANEOUS at 06:28

## 2024-01-01 RX ADMIN — Medication 50 MILLIGRAM(S): at 11:07

## 2024-01-01 RX ADMIN — Medication 17 GRAM(S): at 11:35

## 2024-01-01 RX ADMIN — FENTANYL 50 MICROGRAM(S): 75 PATCH, EXTENDED RELEASE TRANSDERMAL at 17:55

## 2024-01-01 RX ADMIN — DOXAZOSIN 4 MILLIGRAM(S): 1 TABLET ORAL at 22:43

## 2024-01-01 RX ADMIN — Medication 1 MILLIGRAM(S): at 11:22

## 2024-05-25 ENCOUNTER — TRANSCRIPTION ENCOUNTER (OUTPATIENT)
Age: 69
End: 2024-05-25

## 2024-06-03 NOTE — PATIENT PROFILE ADULT - HISTORY OF COVID-19 VACCINATION
Patient calling for fax #, provided her with fax #, said she will be faxing over a form about her Jardiance.   
Yes

## 2024-06-08 ENCOUNTER — TRANSCRIPTION ENCOUNTER (OUTPATIENT)
Age: 69
End: 2024-06-08

## 2024-12-21 NOTE — PATIENT PROFILE ADULT - FALL HARM RISK - HARM RISK INTERVENTIONS

## 2024-12-21 NOTE — PATIENT PROFILE ADULT - DO YOU EVER NEED HELP READING HOSPITAL MATERIALS?
Telephone Encounter by Sydni Parrish at 9/24/2020 11:08 AM     Author: Sydni Parrish Service: -- Author Type: --    Filed: 9/24/2020 11:08 AM Encounter Date: 9/21/2020 Status: Signed    : Sydni Parrish APPROVED:    Approval start date: 8/23/2020  Approval end date:  9/23/2021    Pharmacy has been notified of approval and will contact patient when medication is ready for pickup.                   no
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
## 2024-12-21 NOTE — H&P ADULT - NSHPPHYSICALEXAM_GEN_ALL_CORE
(off sedation for 5 min)    Intubated  pupils 2 and sluggish  + corneals on both   +cough, no gag  no gaze preference  not moving any extremities
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
## 2024-12-21 NOTE — CONSULT NOTE ADULT - SUBJECTIVE AND OBJECTIVE BOX
Patient is a 69y old  Male who presents with a chief complaint of ICH (21 Dec 2024 16:13)    HPI: 69 yr old male history of htn, hld , on afib eliquis with prior ischemic stroke 2018, frequent utis, kidney stones, turp, cabg x 2, 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  went to [place], vomited gcs 13 intubated.  Transferred to [facility] for possible SOC.  On arrival patient is sedated on versed, propofol and fent. (21 Dec 2024 16:13)      PAST MEDICAL & SURGICAL HISTORY:  Calculus of kidney  Hypertension  Hyperlipidemia  Stroke  History of hip replacement, total, left      FAMILY HISTORY:  Family history of kidney stones (Father)      Allergies  No Known Allergies      REVIEW OF SYSTEMS  Negative except as noted in HPI      HOME MEDICATIONS:  Home Medications:  tamsulosin 0.4 mg oral capsule: 1 cap(s) orally once a day (30 Apr 2021 07:55)      MEDICATIONS:  Antibiotics:    Neuro:    Anticoagulation:    OTHER:  atorvastatin 40 milliGRAM(s) Oral at bedtime  chlorhexidine 0.12% Liquid 15 milliLiter(s) Oral Mucosa every 12 hours  niCARdipine Infusion 5 mG/Hr IV Continuous <Continuous>  pantoprazole  Injectable 40 milliGRAM(s) IV Push daily  polyethylene glycol 3350 17 Gram(s) Oral every 12 hours  senna 2 Tablet(s) Oral at bedtime  tamsulosin 0.4 milliGRAM(s) Oral at bedtime    IVF:  sodium chloride 2% . 1000 milliLiter(s) IV Continuous <Continuous>      Vital Signs Last 24 Hrs  T(C): --  T(F): --  HR: 50 (21 Dec 2024 16:04) (50 - 50)  BP: --  BP(mean): --  RR: --  SpO2: 100% (21 Dec 2024 16:04) (100% - 100%)      PHYSICAL EXAM:  GENERAL: Intubated, sedated but paused for exam  HEAD:  Atraumatic, normocephalic  LAURA COMA SCORE: E- V- M- =       E: 4= opens eyes spontaneously 3= to voice 2= to noxious 1= no opening       V: 5= oriented 4= confused 3= inappropriate words 2= incomprehensible sounds 1= nonverbal 1T= intubated       M: 6= follows commands 5= localizes 4= withdraws 3= flexor posturing 2= extensor posturing 1= no movement  MENTAL STATUS: AAO x3; Awake/Comatose; Opens eyes spontaneously/to voice/to light touch/to noxious stimuli; Appropriately conversant without aphasia/Nonverbal; following simple commands/mimicking/not following commands  CRANIAL NERVES: Visual acuity normal for age, visual fields full to confrontation, PERRL. EOMI without nystagmus. Facial sensation intact V1-3 distribution b/l. Face symmetric w/ normal eye closure and smile, tongue midline. Hearing grossly intact. Speech clear. Head turning and shoulder shrug intact.   MOTOR:   SENSATION: grossly intact to light touch all extremities        RADIOLOGY & ADDITIONAL STUDIES:        CAPRINI SCORE [CLOT]:  Patient has an estimated Caprini score of greater than 5.  However, the patient's unique clinical situation will be addressed in an individual manner to determine appropriate anticoagulation treatment, if any. Patient is a 69y old  Male who presents with a chief complaint of ICH (21 Dec 2024 16:13)    HPI: 69 yr old male history of htn, hld , on afib eliquis with prior ischemic stroke 2018, frequent utis, kidney stones, turp, cabg x 2, 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  went to [place], [facility], vomited gcs 13 intubated.  Transferred to [place] for possible SOC.  On arrival patient is sedated on versed, propofol and fent. (21 Dec 2024 16:13)      PAST MEDICAL & SURGICAL HISTORY:  Calculus of kidney  Hypertension  Hyperlipidemia  Stroke  History of hip replacement, total, left      FAMILY HISTORY:  Family history of kidney stones (Father)      Allergies  No Known Allergies      REVIEW OF SYSTEMS  Negative except as noted in HPI      HOME MEDICATIONS:  Home Medications:  tamsulosin 0.4 mg oral capsule: 1 cap(s) orally once a day (30 Apr 2021 07:55)      MEDICATIONS:  Antibiotics:    Neuro:    Anticoagulation:    OTHER:  atorvastatin 40 milliGRAM(s) Oral at bedtime  chlorhexidine 0.12% Liquid 15 milliLiter(s) Oral Mucosa every 12 hours  niCARdipine Infusion 5 mG/Hr IV Continuous <Continuous>  pantoprazole  Injectable 40 milliGRAM(s) IV Push daily  polyethylene glycol 3350 17 Gram(s) Oral every 12 hours  senna 2 Tablet(s) Oral at bedtime  tamsulosin 0.4 milliGRAM(s) Oral at bedtime    IVF:  sodium chloride 2% . 1000 milliLiter(s) IV Continuous <Continuous>      Vital Signs Last 24 Hrs  T(C): --  T(F): --  HR: 50 (21 Dec 2024 16:04) (50 - 50)  BP: --  BP(mean): --  RR: --  SpO2: 100% (21 Dec 2024 16:04) (100% - 100%)      PHYSICAL EXAM:  GENERAL: Intubated, sedated but paused for exam  HEAD:  Atraumatic, normocephalic  LAURA COMA SCORE: E1 V1T M4 =6T       E: 4= opens eyes spontaneously 3= to voice 2= to noxious 1= no opening       V: 5= oriented 4= confused 3= inappropriate words 2= incomprehensible sounds 1= nonverbal 1T= intubated       M: 6= follows commands 5= localizes 4= withdraws 3= flexor posturing 2= extensor posturing 1= no movement  MENTAL STATUS: Intubated, sedated. No eye opening, nonverbal, not following commands.   CRANIAL NERVES: PERRL. No blink to threat. +cough/gag  MOTOR: b/l UE 0/5, RLE trace WD to deep noxious, LLE 0/5   SENSATION: grossly intact to light touch all extremities        RADIOLOGY & ADDITIONAL STUDIES:  repeat CTH pending      CAPRINI SCORE [CLOT]:  Patient has an estimated Caprini score of greater than 5.  However, the patient's unique clinical situation will be addressed in an individual manner to determine appropriate anticoagulation treatment, if any. Patient is a 69y old  Male who presents with a chief complaint of ICH (21 Dec 2024 16:13)    HPI: 69 yr old male history of htn, hld , on afib eliquis with prior ischemic stroke 2018, frequent utis, kidney stones, turp, cabg x 2, 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  went to [place], [facility], vomited gcs 13 intubated.  Transferred to [place] for possible SOC.  On arrival patient is sedated on versed, propofol and fent. (21 Dec 2024 16:13)      PAST MEDICAL & SURGICAL HISTORY:  Calculus of kidney  Hypertension  Hyperlipidemia  Stroke  History of hip replacement, total, left      FAMILY HISTORY:  Family history of kidney stones (Father)      Allergies  No Known Allergies      REVIEW OF SYSTEMS  Negative except as noted in HPI      HOME MEDICATIONS:  Home Medications:  tamsulosin 0.4 mg oral capsule: 1 cap(s) orally once a day (30 Apr 2021 07:55)      MEDICATIONS:  Antibiotics:    Neuro:    Anticoagulation:    OTHER:  atorvastatin 40 milliGRAM(s) Oral at bedtime  chlorhexidine 0.12% Liquid 15 milliLiter(s) Oral Mucosa every 12 hours  niCARdipine Infusion 5 mG/Hr IV Continuous <Continuous>  pantoprazole  Injectable 40 milliGRAM(s) IV Push daily  polyethylene glycol 3350 17 Gram(s) Oral every 12 hours  senna 2 Tablet(s) Oral at bedtime  tamsulosin 0.4 milliGRAM(s) Oral at bedtime    IVF:  sodium chloride 2% . 1000 milliLiter(s) IV Continuous <Continuous>      Vital Signs Last 24 Hrs  T(C): --  T(F): --  HR: 50 (21 Dec 2024 16:04) (50 - 50)  BP: --  BP(mean): --  RR: --  SpO2: 100% (21 Dec 2024 16:04) (100% - 100%)      PHYSICAL EXAM:  GENERAL: Intubated, sedated but paused for exam  HEAD:  Atraumatic, normocephalic  LAURA COMA SCORE: E1 V1T M4 =6T       E: 4= opens eyes spontaneously 3= to voice 2= to noxious 1= no opening       V: 5= oriented 4= confused 3= inappropriate words 2= incomprehensible sounds 1= nonverbal 1T= intubated       M: 6= follows commands 5= localizes 4= withdraws 3= flexor posturing 2= extensor posturing 1= no movement  MENTAL STATUS: Intubated, sedated. No eye opening, nonverbal, not following commands.   CRANIAL NERVES: PERRL. No blink to threat. +cough/gag  MOTOR: b/l UE 0/5, RLE trace WD to deep noxious, LLE 0/5           RADIOLOGY & ADDITIONAL STUDIES:  repeat CTH pending      CAPRINI SCORE [CLOT]:  Patient has an estimated Caprini score of greater than 5.  However, the patient's unique clinical situation will be addressed in an individual manner to determine appropriate anticoagulation treatment, if any.

## 2024-12-21 NOTE — BRIEF OPERATIVE NOTE - NSICDXBRIEFPROCEDURE_GEN_ALL_CORE_FT
PROCEDURES:  Suboccipital craniectomy with biopsy 21-Dec-2024 22:21:00  Shala Briseno   PROCEDURES:  Suboccipital craniectomy with biopsy 21-Dec-2024 22:21:00 suboccipital craniectomy and clot evacuation Shala Briseno

## 2024-12-21 NOTE — H&P ADULT - HISTORY OF PRESENT ILLNESS
69 yr old male history of htn, hld , on afib eliquis with prior ischemic stroke 2018, frequent utis, kidney stones, turp, cabg x 2, 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  went to [place], ich, vomited gcs 13 intubated.  Transferred to [facility] for possible SOC.  On arrival patient is sedated on versed, propofol and fent.  69 yr old male history of htn, hld , on afib eliquis with prior ischemic stroke 2018, frequent utis, kidney stones, turp, cabg x 2, 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  [facility] vomited gcs 13 intubated.  Transferred to [facility] for possible SOC.  On arrival patient is sedated on versed, propofol and fent.    MRS 0  ICH score 3  NIHSS 25

## 2024-12-21 NOTE — INITIAL ORGAN DONATION REFERRAL - NSORGANDONATIONCLINICALTRIGGER_GEN_ALL_CORE
[place] Coma Scale is less than or equal to 5/Family discussion withdrawal of life-sustaining therapies is anticipated

## 2024-12-21 NOTE — CONSULT NOTE ADULT - ASSESSMENT
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 GCS 13 on arrival but began vomiting so intubated for airway protection & transferred to [facility].    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 GCS 13 on arrival but began vomiting so intubated for airway protection & transferred to [facility].      PLAN  - case d/w team  - neuro checks q1, SOC watch but likely plan for OR tonight pending family discussion with attending  - h/o ETOH, CIWA protocol  - repeat CTH pending  - pain control as needed  - ICP optimization, Na goal 140-150  - normotension  - SCDs for dvt ppx, hold AC/AP in setting of acute bleed  - further care per NSICU  - will continue to follow

## 2024-12-22 NOTE — PROVIDER CONTACT NOTE (CHANGE IN STATUS NOTIFICATION) - SITUATION
Patient neuro exam unchanged, prior score was for intubated patient. Patient NIH score increased. PA Sonny at bedside, made aware

## 2024-12-22 NOTE — PROGRESS NOTE ADULT - CRITICAL CARE ATTENDING COMMENT
My full attention was spent providing medically necessary critical care to the patient with details documented in my note above.   Critical care time spent examining patient, reviewing vitals, labs, medications, imaging and discussing with the team goals of care   The combined critical care time provided to the patient was 66 minutes  This time does not include bedside procedures that are documented separately.

## 2024-12-22 NOTE — PROGRESS NOTE ADULT - SUBJECTIVE AND OBJECTIVE BOX
Chief complaint:   Patient is a 69y old  Male who presents with a chief complaint of ICH (22 Dec 2024 00:19)    HPI:   69 yr old male history of htn, hld , on afib eliquis with prior ischemic stroke 2018, frequent utis, kidney stones, turp, cabg x 2, 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  went to [place], ich, vomited gcs 13 intubated.  Transferred to [facility] for possible SOC.  On arrival patient is sedated on versed, propofol and fent. (21 Dec 2024 16:13)        24hr EVENTS:      ROS: [ ]  Unable to assess due to mental status   All other systems negative    -----------------------------------------------------------------------------------------------------------------------------------------------------------------------------------  ICU Vital Signs Last 24 Hrs  T(C): 37.2 (22 Dec 2024 09:00), Max: 37.2 (22 Dec 2024 08:00)  T(F): 99 (22 Dec 2024 09:00), Max: 99 (22 Dec 2024 08:00)  HR: 60 (22 Dec 2024 09:00) (48 - 94)  BP: 110/61 (22 Dec 2024 09:00) (90/81 - 229/105)  BP(mean): 76 (22 Dec 2024 09:00) (76 - 142)  ABP: 120/72 (22 Dec 2024 07:00) (112/44 - 209/89)  ABP(mean): 87 (22 Dec 2024 07:00) (62 - 132)  RR: 21 (22 Dec 2024 09:00) (15 - 30)  SpO2: 96% (22 Dec 2024 09:00) (94% - 100%)    O2 Parameters below as of 22 Dec 2024 08:00  Patient On (Oxygen Delivery Method): room air            I&O's Summary    21 Dec 2024 07:01  -  22 Dec 2024 07:00  --------------------------------------------------------  IN: 1048.5 mL / OUT: 845 mL / NET: 203.5 mL    22 Dec 2024 07:01  -  22 Dec 2024 09:31  --------------------------------------------------------  IN: 225.3 mL / OUT: 60 mL / NET: 165.3 mL        MEDICATIONS  (STANDING):  atorvastatin 40 milliGRAM(s) Oral at bedtime  ceFAZolin  Injectable. 2000 milliGRAM(s) IV Push every 8 hours  chlorhexidine 0.12% Liquid 15 milliLiter(s) Oral Mucosa every 12 hours  chlorhexidine 2% Cloths 1 Application(s) Topical daily  dexMEDEtomidine Infusion 0.2 MICROgram(s)/kG/Hr (5.09 mL/Hr) IV Continuous <Continuous>  dextrose 5%. 1000 milliLiter(s) (100 mL/Hr) IV Continuous <Continuous>  dextrose 5%. 1000 milliLiter(s) (50 mL/Hr) IV Continuous <Continuous>  dextrose 50% Injectable 25 Gram(s) IV Push once  dextrose 50% Injectable 12.5 Gram(s) IV Push once  dextrose 50% Injectable 25 Gram(s) IV Push once  influenza  Vaccine (HIGH DOSE) 0.5 milliLiter(s) IntraMuscular once  insulin lispro (ADMELOG) corrective regimen sliding scale   SubCutaneous every 6 hours  niCARdipine Infusion 5 mG/Hr (25 mL/Hr) IV Continuous <Continuous>  pantoprazole  Injectable 40 milliGRAM(s) IV Push daily  polyethylene glycol 3350 17 Gram(s) Oral every 12 hours  senna 2 Tablet(s) Oral at bedtime  sodium chloride 2% . 1000 milliLiter(s) (60 mL/Hr) IV Continuous <Continuous>  tamsulosin 0.4 milliGRAM(s) Oral at bedtime      RESPIRATORY:  Mode: CPAP with PS  FiO2: 40  PEEP: 6  PS: 6  ITime: 1  MAP: 8        IMAGING:   Recent imaging studies were reviewed.    LAB RESULTS:                          16.6   11.47 )-----------( 196      ( 22 Dec 2024 02:27 )             48.8       PT/INR - ( 22 Dec 2024 02:27 )   PT: 11.7 sec;   INR: 1.04 ratio         PTT - ( 22 Dec 2024 02:27 )  PTT:30.8 sec    12-22    138  |  104  |  24.0[H]  ----------------------------<  219[H]  4.4   |  20.0[L]  |  1.58[H]    Ca    8.5      22 Dec 2024 02:27  Phos  4.1     12-22  Mg     1.9     12-22    TPro  6.4[L]  /  Alb  3.7  /  TBili  0.7  /  DBili  x   /  AST  32  /  ALT  22  /  AlkPhos  63  12-21      ABG - ( 21 Dec 2024 22:15 )  pH, Arterial: 7.380 pH, Blood: x     /  pCO2: 33    /  pO2: 152   / HCO3: 20    / Base Excess: -5.6  /  SaO2: 98.8        -----------------------------------------------------------------------------------------------------------------------------------------------------------------------------------    PHYSICAL EXAM:  General: Calm, intubated  HEENT: MMM  Neuro:  -Mental status- No acute distress  -CN- PERRL 3mm, EOMI, tongue midline, face symmetric    CV: RRR  Pulm: clear to auscultation  Abd: Soft, nontender, nondistended  Ext: no noted edema in lower ext  Skin: warm, dry       Chief complaint:   Patient is a 69y old  Male who presents with a chief complaint of ICH (22 Dec 2024 00:19)    HPI:   69 yr old male history of htn, hld , on afib eliquis with prior ischemic stroke 2018, frequent utis, kidney stones, turp, cabg x 2, 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  went to [place], ich, vomited gcs 13 intubated.  Transferred to [facility] for possible SOC.  On arrival patient is sedated on versed, propofol and fent. (21 Dec 2024 16:13)    24hr EVENTS:  acute SOC  improved exam  rash with elisha    ROS: [x ]  Unable to assess due to mental status   All other systems negative    -----------------------------------------------------------------------------------------------------------------------------------------------------------------------------------  ICU Vital Signs Last 24 Hrs  T(C): 37.2 (22 Dec 2024 09:00), Max: 37.2 (22 Dec 2024 08:00)  T(F): 99 (22 Dec 2024 09:00), Max: 99 (22 Dec 2024 08:00)  HR: 60 (22 Dec 2024 09:00) (48 - 94)  BP: 110/61 (22 Dec 2024 09:00) (90/81 - 229/105)  BP(mean): 76 (22 Dec 2024 09:00) (76 - 142)  ABP: 120/72 (22 Dec 2024 07:00) (112/44 - 209/89)  ABP(mean): 87 (22 Dec 2024 07:00) (62 - 132)  RR: 21 (22 Dec 2024 09:00) (15 - 30)  SpO2: 96% (22 Dec 2024 09:00) (94% - 100%)    O2 Parameters below as of 22 Dec 2024 08:00  Patient On (Oxygen Delivery Method): room air        I&O's Summary    21 Dec 2024 07:01  -  22 Dec 2024 07:00  --------------------------------------------------------  IN: 1048.5 mL / OUT: 845 mL / NET: 203.5 mL    22 Dec 2024 07:01  -  22 Dec 2024 09:31  --------------------------------------------------------  IN: 225.3 mL / OUT: 60 mL / NET: 165.3 mL        MEDICATIONS  (STANDING):  atorvastatin 40 milliGRAM(s) Oral at bedtime  ceFAZolin  Injectable. 2000 milliGRAM(s) IV Push every 8 hours  chlorhexidine 0.12% Liquid 15 milliLiter(s) Oral Mucosa every 12 hours  chlorhexidine 2% Cloths 1 Application(s) Topical daily  dexMEDEtomidine Infusion 0.2 MICROgram(s)/kG/Hr (5.09 mL/Hr) IV Continuous <Continuous>  dextrose 5%. 1000 milliLiter(s) (100 mL/Hr) IV Continuous <Continuous>  dextrose 5%. 1000 milliLiter(s) (50 mL/Hr) IV Continuous <Continuous>  dextrose 50% Injectable 25 Gram(s) IV Push once  dextrose 50% Injectable 12.5 Gram(s) IV Push once  dextrose 50% Injectable 25 Gram(s) IV Push once  influenza  Vaccine (HIGH DOSE) 0.5 milliLiter(s) IntraMuscular once  insulin lispro (ADMELOG) corrective regimen sliding scale   SubCutaneous every 6 hours  niCARdipine Infusion 5 mG/Hr (25 mL/Hr) IV Continuous <Continuous>  pantoprazole  Injectable 40 milliGRAM(s) IV Push daily  polyethylene glycol 3350 17 Gram(s) Oral every 12 hours  senna 2 Tablet(s) Oral at bedtime  sodium chloride 2% . 1000 milliLiter(s) (60 mL/Hr) IV Continuous <Continuous>  tamsulosin 0.4 milliGRAM(s) Oral at bedtime      RESPIRATORY:  Mode: CPAP with PS  FiO2: 40  PEEP: 6  PS: 6  ITime: 1  MAP: 8        IMAGING:   Recent imaging studies were reviewed.    LAB RESULTS:                          16.6   11.47 )-----------( 196      ( 22 Dec 2024 02:27 )             48.8       PT/INR - ( 22 Dec 2024 02:27 )   PT: 11.7 sec;   INR: 1.04 ratio         PTT - ( 22 Dec 2024 02:27 )  PTT:30.8 sec    12-22    138  |  104  |  24.0[H]  ----------------------------<  219[H]  4.4   |  20.0[L]  |  1.58[H]    Ca    8.5      22 Dec 2024 02:27  Phos  4.1     12-22  Mg     1.9     12-22    TPro  6.4[L]  /  Alb  3.7  /  TBili  0.7  /  DBili  x   /  AST  32  /  ALT  22  /  AlkPhos  63  12-21      ABG - ( 21 Dec 2024 22:15 )  pH, Arterial: 7.380 pH, Blood: x     /  pCO2: 33    /  pO2: 152   / HCO3: 20    / Base Excess: -5.6  /  SaO2: 98.8        -----------------------------------------------------------------------------------------------------------------------------------------------------------------------------------    PHYSICAL EXAM:  General: Calm, intubated  HEENT: MMM  Neuro:  -Mental status- No acute distress, EO spont, following commands  agitated  -CN- PERRL 3mm, EOMI, tongue midline, face symmetric  MORA    CV: RRR  Pulm: clear to auscultation  Abd: Soft, nontender, nondistended  Ext: no noted edema in lower ext  Skin: warm, dry

## 2024-12-22 NOTE — PROGRESS NOTE ADULT - ASSESSMENT
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   GCS 13 on arrival but began vomiting so intubated for airway protection & transferred to [facility].  now S/p suboccipital craniectomy with clot evacuation. Exam post op with improvement.    PLAN  - Q1 neuro checks  - Na goal 140-145  - SBP<140, on cardene   - CT in AM  - HOB >30  - Ancef x 24 hours  - no drains  - Pt with alvares  - dvt ppx- scds, lovenox POD#1    case and plan discussed with Dr. Delaney

## 2024-12-22 NOTE — PROGRESS NOTE ADULT - ASSESSMENT
69 yr old male history of htn, hld , on afib eliquis with prior ischemic stroke 2018, frequent utis, kidney stones, turp, cabg x 2, 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  went to [place], ich, vomited gcs 13 intubated.  Transferred to [facility] for possible SOC.  On arrival patient is sedated on versed, propofol and fent.      neuro  neuro and vital checks q 1  appreciate neurosurgery consult  SOC watch  goal -150  history of etoh abuse monitor for withdrawl    Resp  intubated  cxr     cardio   keep sbp 100-160  afib: goal hr 70 hold eliquis in light of ICH  cabg history: ekg  htn: home meds on hold continue cardene  hld: start lipitor 40    gi:  ogt  miralax, senna for bowel regimen  NPO  history of vomiting prior to intubation    gu:  alvares  goal Na 140-150, start 2%    endo:   follow up A!C  NIHSS  ISS    scds        69 yr old male history of htn, hld , on afib eliquis with prior ischemic stroke 2018, frequent utis, kidney stones, turp, cabg x 2, 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  went to [place], ich, vomited gcs 13 intubated.  Transferred to [facility] for possible SOC.  On arrival patient is sedated on versed, propofol and fent.    Neuro  POD 1- SOC  neuro and vital checks q 1  history of etoh abuse monitor for withdrawl  thiamine, folate, MVT    Resp  intubated  SBT as tolerated    cardio   keep sbp 100-160  afib: goal hr 70, hold eliquis in light of ICH  cabg history: ekg  htn: home meds- metoprolol and lisinopril  start metop 12.5 bid  continue cardene  hld: lipitor 40    gi:  ogt  miralax, senna for bowel regimen  NPO  history of vomiting prior to intubation- zofran prn  LBM 12/21    gu:  alvares  goal Na 135-145, 2%  q8h BMP  flomax    endo: A1c 6  NIHSS  ISS    scds        69 yr old male history of htn, hld , on afib eliquis with prior ischemic stroke 2018, frequent utis, kidney stones, turp, cabg x 2, 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  went to [place], ich, vomited gcs 13 intubated.  Transferred to [facility] for possible SOC.  On arrival patient is sedated on versed, propofol and fent.    Neuro  POD 1- SOC  neuro and vital checks q 1  history of etoh abuse monitor for withdrawl  thiamine, folate, MVT    Resp  intubated  SBT as tolerated    cardio   keep sbp 100-160  afib: goal hr 70, hold eliquis in light of ICH  cabg history: ekg  htn: home meds- metoprolol and lisinopril  start metop 12.5 bid  continue cardene  hld: lipitor 40    gi:  miralax, senna for bowel regimen  NPO  sp/sw eval when extubated  history of vomiting prior to intubation- zofran prn  LBM 12/21    gu:  alvares  goal Na 135-145, 2%  q8h BMP  flomax    endo: A1c 6  NIHSS  ISS    heme  scds, start lovenox pod 1  hold eliquis  s/p PCC

## 2024-12-23 NOTE — CONSULT NOTE ADULT - ASSESSMENT
PENDING COMPLETE AND UPDATED NOTE *********    ASSESSMENT:     NEURO:   -Neurologically --- , re-intubated this AM  -Continue close monitoring for neurologic deterioration    -Stroke neuro checks q 1h  -SBP goal <160, avoiding rapid fluctuations and hypotension.   -ANTITHROMBOTIC THERAPY: currently on hold in setting of ICH  -Titrate statin to LDL goal less than 70 ; continue Lipitor 40mg via NGT  -MRI Brain w/o when clinically able  -Dysphagia screen: fail, NGT in place  -Physical therapy/OT/Speech eval/treatment.   -Check TTE once able  -Cardiac monitoring w/ telemetry for now, further evaluation pending findings of noted workup  , +/- ILR             -Patient should have all age and risk appropriate malignancy screenings with PCP or sooner if clinically suspected   -DVT ppx: Heparin s.c [] LMWH [X] SCD []    -Maintain adequate hydration    -Na Goal: 135-145   -Monitor for si/sx of infection   -LDL/A1C as above  -Stroke education     OTHER: Condition and plan of care d/w patient, questions and concerns addressed.     DISPOSITION: Rehab or home depending on PT eval once stable and workup is complete      CORE MEASURES:        Admission NIHSS:      Tenecteplase : [] YES [X] NO      LDL/A1C: 88/6.0     Depression Screen - if depression hx and/or present      Statin Therapy:      Dysphagia Screen: [] PASS [X] FAIL -- NGT     Smoking [] YES [X] NO      Afib [] YES [] NO     Stroke Education [] YES [] NO   PENDING COMPLETE AND UPDATED NOTE *********    ASSESSMENT:   The pt is a 69y M PMHx ischemic stroke (2018), atrial fibrillation on Eliquis, ETOH abuse, HTN, HLD, frequent UTI, hx kidney stones, s/p TURP, s/p CABG x2, and babesiasis sepsis in 06/2024 who initially presented to [facility] on 12/21/24 complaining of dizziness while driving with associated nausea and confusion. While at [facility], GCS was noted to be 13. (+) vomiting. CT imaging revealed large cerebellar hemorrhage w/ severe mass effect and mild dissection into the fourth ventricle; associated hydrocephalus, 3.6mm left-to-right midline shift, b/l occipital horn intraventricular hemorrhage and mild SAH in the Left cerebellum. Also notable for 2.2mm left frontal convexity subdural hygroma. Patient was subsequently intubated and transferred to [facility] for possible suboccipital craniotomy. Upon arrival patient was sedated on Versed, Propofol and Fentanyl; admitted to NSICU.       NEURO:   -Neurologically --- , re-intubated this AM  -Continue close monitoring for neurologic deterioration    -Stroke neuro checks q 1h  -SBP goal <160, avoiding rapid fluctuations and hypotension.   -ANTITHROMBOTIC THERAPY: currently on hold in setting of ICH  -Titrate statin to LDL goal less than 70 ; continue Lipitor 40mg via NGT  -MRI Brain w/o when clinically able  -Dysphagia screen: fail, NGT in place  -Physical therapy/OT/Speech eval/treatment.   -Check TTE once able  -Cardiac monitoring w/ telemetry for now, further evaluation pending findings of noted workup  , +/- ILR             -Patient should have all age and risk appropriate malignancy screenings with PCP or sooner if clinically suspected   -DVT ppx: Heparin s.c [] LMWH [X] SCD []    -Maintain adequate hydration    -Na Goal: 135-145   -Monitor for si/sx of infection   -LDL/A1C as above  -Stroke education     OTHER: Condition and plan of care d/w patient, questions and concerns addressed.     DISPOSITION: Rehab or home depending on PT eval once stable and workup is complete      CORE MEASURES:        Admission NIHSS:      Tenecteplase : [] YES [X] NO      LDL/A1C: 88/6.0     Depression Screen - if depression hx and/or present      Statin Therapy:      Dysphagia Screen: [] PASS [X] FAIL -- NGT     Smoking [] YES [X] NO      Afib [] YES [] NO     Stroke Education [] YES [] NO   ASSESSMENT:   The pt is a 69y M PMHx ischemic stroke (2018), atrial fibrillation on Eliquis, ETOH abuse, HTN, HLD, frequent UTI, hx kidney stones, s/p TURP, s/p CABG x2, and babesiasis sepsis in 06/2024 who [place] [facility] [place] complaining of dizziness while driving with associated nausea and confusion. [place] [facility] GCS was noted to be 13. (+) vomiting. CT imaging revealed large cerebellar hemorrhage w/ severe mass effect and mild dissection into the fourth ventricle; associated hydrocephalus, 3.6mm left-to-right midline shift, b/l occipital horn intraventricular hemorrhage, 2.2mm left frontal convexity subdural hygroma and mild SAH in the Left cerebellum. Transferred to [facility] for suboccipital craniectomy.    Impression: Acute non-traumatic cerebellar ICH with vasogenic edema, cerebellar compression and obstructive hydrocephalus, s/p Left decompressive SOC 12/21/24; etiology likely hypertensive with superimposed ETOH abuse vs underlying coagulopathy.     NEURO:   -Neurologically limited exam, intubated and sedated.   -Continue close monitoring for neurologic deterioration    -Coma neuro checks q 1h  -SBP goal <160, avoiding rapid fluctuations and hypotension.   -ANTITHROMBOTIC THERAPY: currently on hold in setting of ICH.   -Hx AF: Eliquis on hold in setting of ICH, reinitiation timeline per NS however from a stroke neuro standpoint would hold for at least 1 month.   -Titrate statin to LDL goal less than 70 ; can continue Lipitor 40mg via NGT  -MRI Brain w/o once clinically able  -Dysphagia screen: fail, NGT in place  -Physical therapy/OT/Speech eval/treatment.   -Check TTE once able  -Cardiac monitoring w/ telemetry for now, further evaluation pending findings of noted workup  , +/- ILR             -Patient should have all age and risk appropriate malignancy screenings with PCP or sooner if clinically suspected   -DVT ppx: Heparin s.c [] LMWH [X] SCD []    -Maintain adequate hydration    -Na Goal: 135-145   -Monitor for si/sx of infection   -LDL/A1C as above  -Stroke education     OTHER: Condition and plan of care d/w patient, questions and concerns addressed.     DISPOSITION: Rehab or home depending on PT eval once stable and workup is complete      CORE MEASURES:        Admission NIHSS:      Tenecteplase : [] YES [X] NO      LDL/A1C: 88 / 6.0     Depression Screen - if depression hx and/or present      Statin Therapy:      Dysphagia Screen: [] PASS [X] FAIL -- NGT     Smoking [] YES [X] NO      Afib [X] YES [] NO     Stroke Education [] YES [] NO   ASSESSMENT:   The pt is a 69y M PMHx ischemic stroke (2018), atrial fibrillation on Eliquis, ETOH abuse, HTN, HLD, frequent UTI, hx kidney stones, s/p TURP, s/p CABG x2, and babesiasis sepsis in 06/2024 who initially presented to [facility] on 12/21/24 complaining of dizziness while driving with associated nausea and confusion. While at [facility], GCS was noted to be 13. (+) vomiting. CT imaging revealed large cerebellar hemorrhage w/ severe mass effect and mild dissection into the fourth ventricle; associated hydrocephalus, 3.6mm left-to-right midline shift, b/l occipital horn intraventricular hemorrhage, 2.2mm left frontal convexity subdural hygroma and mild SAH in the Left cerebellum. Transferred to [facility] for suboccipital craniectomy.    Impression: Acute non-traumatic cerebellar ICH with vasogenic edema, cerebellar compression and obstructive hydrocephalus, s/p Left decompressive SOC 12/21/24; etiology likely hypertensive with superimposed ETOH abuse vs underlying coagulopathy.     NEURO:   -Neurologically limited exam, intubated and sedated.   -Continue close monitoring for neurologic deterioration    -Coma neuro checks q 1h  -SBP goal <160, avoiding rapid fluctuations and hypotension.   -ANTITHROMBOTIC THERAPY: currently on hold in setting of ICH.   -Hx AF: Eliquis on hold in setting of ICH, reinitiation timeline per NS however from a stroke neuro standpoint would hold for at least 1 month.   -statin not indicated in the setting of ICH  -MRI Brain w/o once clinically able, not likely to change mgmt at this time  -Dysphagia screen: fail, NGT in place  -Physical therapy/OT/Speech eval/treatment.   -Check TTE once able  -Cardiac monitoring w/ telemetry           -DVT ppx: Heparin s.c [] LMWH [X] SCD []    -Maintain adequate hydration    -Na Goal: 135-145   -Monitor for si/sx of infection   -LDL/A1C as above  -Stroke education     OTHER: Condition and plan of care d/w patient, questions and concerns addressed.     DISPOSITION: Rehab or home depending on PT eval once stable and workup is complete      CORE MEASURES:        Admission NIHSS:      Tenecteplase : [] YES [X] NO      LDL/A1C: 88 / 6.0     Depression Screen - if depression hx and/or present      Statin Therapy:      Dysphagia Screen: [] PASS [X] FAIL -- NGT     Smoking [] YES [X] NO      Afib [X] YES [] NO     Stroke Education [] YES [] NO

## 2024-12-23 NOTE — PROGRESS NOTE ADULT - SUBJECTIVE AND OBJECTIVE BOX
INTERVAL HPI/ACUTE EVENTS: 69yMale POD#1 s/p suboccipital craniectomy and clot evacuation, remained extubated post procedure. Patient extubated by NSICU team on 12/22, patient remains on precedex @1.5 and has required PRNs for CIWA protocol and agitation.  Patient remains MORA AG very strong, PERRL, patient unable to participate in the remainder of neuro exam which is unchanged from patient's exam during day shift.     Vital Signs Last 24 Hrs  T(C): 36.9 (23 Dec 2024 00:00), Max: 37.6 (22 Dec 2024 16:45)  T(F): 98.4 (23 Dec 2024 00:00), Max: 99.7 (22 Dec 2024 16:45)  HR: 69 (23 Dec 2024 00:00) (54 - 98)  BP: 148/73 (23 Dec 2024 00:00) (101/58 - 180/97)  BP(mean): 96 (23 Dec 2024 00:00) (70 - 148)  RR: 26 (23 Dec 2024 00:00) (15 - 30)  SpO2: 100% (23 Dec 2024 00:00) (93% - 100%)    Parameters below as of 22 Dec 2024 16:00  Patient On (Oxygen Delivery Method): room air    Physical Exam:  General: NAD  Cardio: NSR on monitor  Pulm: extubated on 4L NC  Abdomen: soft, non-distended  Neuro: PERRL, MORA AG spontaneously, garbled speech and says the word "no" when trying to examine patient.  Does not follow commands, patient unable to participate in remainder of neuro exam.     RADIOLOGY:  < from: CT Head No Cont (12.22.24 @ 06:42) >ACC: [id number] EXAM:  CT BRAIN   ORDERED BY: KAYLEIGH FELICIANO -PROCEDURE DATE:  12/22/2024    INTERPRETATION:  CLINICAL INFORMATION: intracranial hemorrhage      interval imaging      5mm axial sections of the brain were obtained from base to vertex,   without the intravenous administration of contrast material. Coronal and   sagittal computer generated reconstructed views are available.    Comparison is made with the prior CT of 12/21/2024 at 10:47 PM.    There has been a left suboccipital craniectomy. The large midline vermian   mass previously measuring 3 x 4.7 cm has been removed. There is a small   amount of residual hemorrhage, postoperative changes with lucency. There   is residual mass effect on the fourth ventricle. Hemorrhage layers in the   occipital horns. There is air in the frontal horns. Subarachnoid air and   subdural air is identified in the frontal regions. Ventricles are mildly   dilated.    IMPRESSION: No change since 12/21/2024 at 10:47 PM. Left suboccipital   craniectomy with postoperative changes and residual hemorrhage in the   cerebellar vermis. Intraventricular air and hemorrhage. Mild ventricular   dilatation.    --- End of Report ---      DEANNA DRISCOLL MD; Attending Radiologist  This document has been electronically signed. Dec 22 2024  7:53AM    < end of copied text >      LABS:                        16.6   11.47 )-----------( 196      ( 22 Dec 2024 02:27 )             48.8       12-22    144  |  109[H]  |  25.6[H]  ----------------------------<  147[H]  4.3   |  21.0[L]  |  1.44[H]    Ca    8.1[L]      22 Dec 2024 14:23  Phos  4.1     12-22  Mg     1.9     12-22    TPro  6.4[L]  /  Alb  3.7  /  TBili  0.7  /  DBili  x   /  AST  32  /  ALT  22  /  AlkPhos  63  12-21          Urinalysis Basic - ( 22 Dec 2024 14:23 )  Color: x / Appearance: x / SG: x / pH: x  Gluc: 147 mg/dL / Ketone: x  / Bili: x / Urobili: x   Blood: x / Protein: x / Nitrite: x   Leuk Esterase: x / RBC: x / WBC x   Sq Epi: x / Non Sq Epi: x / Bacteria: x      PT/INR - ( 22 Dec 2024 02:27 )   PT: 11.7 sec;   INR: 1.04 ratio    PTT - ( 22 Dec 2024 02:27 )  PTT:30.8 sec      CAPILLARY BLOOD GLUCOSE  POCT Blood Glucose.: 158 mg/dL (22 Dec 2024 23:56)             INTERVAL HPI/ACUTE EVENTS: 69yMale POD#1 s/p suboccipital craniectomy and clot evacuation, remained extubated post procedure. Patient extubated by NSICU team on 12/22, patient remains on precedex @1.5 and has required PRNs for CIWA protocol and agitation.  Patient remains MORA AG very strong, PERRL, patient unable to participate in the remainder of neuro exam which is unchanged from patient's exam during day shift.     Vital Signs Last 24 Hrs  T(C): 36.9 (23 Dec 2024 00:00), Max: 37.6 (22 Dec 2024 16:45)  T(F): 98.4 (23 Dec 2024 00:00), Max: 99.7 (22 Dec 2024 16:45)  HR: 69 (23 Dec 2024 00:00) (54 - 98)  BP: 148/73 (23 Dec 2024 00:00) (101/58 - 180/97)  BP(mean): 96 (23 Dec 2024 00:00) (70 - 148)  RR: 26 (23 Dec 2024 00:00) (15 - 30)  SpO2: 100% (23 Dec 2024 00:00) (93% - 100%)    Parameters below as of 22 Dec 2024 16:00  Patient On (Oxygen Delivery Method): room air    Physical Exam:  General: NAD  Cardio: NSR on monitor  Pulm: extubated on 4L NC  Abdomen: soft, non-distended  Neuro: PERRL, MROA AG spontaneously, garbled speech and says the word "no" when trying to examine patient.  Does not follow commands, patient unable to participate in remainder of neuro exam. (d/w NSICU Dr. Harry and Day RN this exam is unchanged since post extubation).     RADIOLOGY:  < from: CT Head No Cont (12.22.24 @ 06:42) >ACC: [id number] EXAM:  CT BRAIN   ORDERED BY: KAYLEIGH FELICIANO -PROCEDURE DATE:  12/22/2024    INTERPRETATION:  CLINICAL INFORMATION: intracranial hemorrhage      interval imaging      5mm axial sections of the brain were obtained from base to vertex,   without the intravenous administration of contrast material. Coronal and   sagittal computer generated reconstructed views are available.    Comparison is made with the prior CT of 12/21/2024 at 10:47 PM.    There has been a left suboccipital craniectomy. The large midline vermian   mass previously measuring 3 x 4.7 cm has been removed. There is a small   amount of residual hemorrhage, postoperative changes with lucency. There   is residual mass effect on the fourth ventricle. Hemorrhage layers in the   occipital horns. There is air in the frontal horns. Subarachnoid air and   subdural air is identified in the frontal regions. Ventricles are mildly   dilated.    IMPRESSION: No change since 12/21/2024 at 10:47 PM. Left suboccipital   craniectomy with postoperative changes and residual hemorrhage in the   cerebellar vermis. Intraventricular air and hemorrhage. Mild ventricular   dilatation.    --- End of Report ---      DEANNA DRISCOLL MD; Attending Radiologist  This document has been electronically signed. Dec 22 2024  7:53AM    < end of copied text >      LABS:                        16.6   11.47 )-----------( 196      ( 22 Dec 2024 02:27 )             48.8       12-22    144  |  109[H]  |  25.6[H]  ----------------------------<  147[H]  4.3   |  21.0[L]  |  1.44[H]    Ca    8.1[L]      22 Dec 2024 14:23  Phos  4.1     12-22  Mg     1.9     12-22    TPro  6.4[L]  /  Alb  3.7  /  TBili  0.7  /  DBili  x   /  AST  32  /  ALT  22  /  AlkPhos  63  12-21          Urinalysis Basic - ( 22 Dec 2024 14:23 )  Color: x / Appearance: x / SG: x / pH: x  Gluc: 147 mg/dL / Ketone: x  / Bili: x / Urobili: x   Blood: x / Protein: x / Nitrite: x   Leuk Esterase: x / RBC: x / WBC x   Sq Epi: x / Non Sq Epi: x / Bacteria: x      PT/INR - ( 22 Dec 2024 02:27 )   PT: 11.7 sec;   INR: 1.04 ratio    PTT - ( 22 Dec 2024 02:27 )  PTT:30.8 sec      CAPILLARY BLOOD GLUCOSE  POCT Blood Glucose.: 158 mg/dL (22 Dec 2024 23:56)

## 2024-12-23 NOTE — CONSULT NOTE ADULT - SUBJECTIVE AND OBJECTIVE BOX
Preliminary note, official note pending attending review/signature.  Seen and examined by Stroke team attending/team, assessment/ plan as discussed with stroke team attending/team as noted.     [facility] Stroke Team    PENDING COMPLETE AND UPDATED NOTE ********    HPI:  69 yr old male history of htn, hld , on afib eliquis with prior ischemic stroke 2018, frequent utis, kidney stones, turp, cabg x 2, 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  went to [place], ich, vomited gcs 13 intubated.  Transferred to [facility] for possible SOC.  On arrival patient is sedated on versed, propofol and fent.    PAST MEDICAL & SURGICAL HISTORY:  Calculus of kidney  Hypertension  Hyperlipidemia  Stroke  History of hip replacement, total, left      MEDICATIONS  (STANDING):  atorvastatin 40 milliGRAM(s) Oral at bedtime  chlorhexidine 0.12% Liquid 15 milliLiter(s) Oral Mucosa every 12 hours  chlorhexidine 2% Cloths 1 Application(s) Topical daily  dexMEDEtomidine Infusion 0.2 MICROgram(s)/kG/Hr (5.09 mL/Hr) IV Continuous <Continuous>  dextrose 5%. 1000 milliLiter(s) (100 mL/Hr) IV Continuous <Continuous>  dextrose 5%. 1000 milliLiter(s) (50 mL/Hr) IV Continuous <Continuous>  dextrose 50% Injectable 25 Gram(s) IV Push once  dextrose 50% Injectable 12.5 Gram(s) IV Push once  dextrose 50% Injectable 25 Gram(s) IV Push once  enoxaparin Injectable 40 milliGRAM(s) SubCutaneous every 24 hours  etomidate Injectable 20 milliGRAM(s) IV Push once  folic acid Injectable 1 milliGRAM(s) IV Push daily  influenza  Vaccine (HIGH DOSE) 0.5 milliLiter(s) IntraMuscular once  insulin lispro (ADMELOG) corrective regimen sliding scale   SubCutaneous every 6 hours  niCARdipine Infusion 5 mG/Hr (25 mL/Hr) IV Continuous <Continuous>  pantoprazole  Injectable 40 milliGRAM(s) IV Push daily  propofol Infusion 10 MICROgram(s)/kG/Min (6.1 mL/Hr) IV Continuous <Continuous>  propofol Injectable 30 milliGRAM(s) IV Push once  rocuronium Injectable 100 milliGRAM(s) IV Push once  sodium chloride 0.9%. 1000 milliLiter(s) (75 mL/Hr) IV Continuous <Continuous>  thiamine IVPB 500 milliGRAM(s) IV Intermittent every 8 hours    MEDICATIONS  (PRN):  hydrALAZINE Injectable 10 milliGRAM(s) IV Push every 2 hours PRN SBP >160  labetalol Injectable 10 milliGRAM(s) IV Push every 2 hours PRN Systolic blood pressure > 160  LORazepam   Injectable 1 milliGRAM(s) IV Push every 2 hours PRN CIWA-Ar score increase by 2 points and a total score of 7 or less  ondansetron Injectable 4 milliGRAM(s) IV Push every 6 hours PRN Nausea and/or Vomiting      Allergies  rocuronium (Rash (Severe))    SOCIAL HISTORY:  no tob,   ETOH abuse     FAMILY HISTORY:  Family history of kidney stones (Father)    ROS: 14 point ROS negative other than what is present in HPI or below    Vital Signs Last 24 Hrs  T(C): 37.5 (23 Dec 2024 08:30), Max: 37.6 (22 Dec 2024 16:45)  T(F): 99.5 (23 Dec 2024 08:30), Max: 99.7 (22 Dec 2024 16:45)  HR: 100 (23 Dec 2024 08:30) (54 - 100)  BP: 186/96 (23 Dec 2024 08:30) (101/58 - 186/96)  BP(mean): 122 (23 Dec 2024 08:30) (70 - 148)  RR: 29 (23 Dec 2024 08:30) (16 - 29)  SpO2: 100% (23 Dec 2024 08:30) (93% - 100%)    Parameters below as of 22 Dec 2024 16:00  Patient On (Oxygen Delivery Method): room air      Physical Exam:  General: No acute distress.       Detailed Neurologic Exam:    Mental status: The patient is awake and alert and has normal attention span.  The patient is fully oriented in 3 spheres. The patient is oriented to current events. The patient is able to name objects, follow commands, repeat sentences.    Cranial nerves: Pupils equal and react symmetrically to light. There is no visual field deficit to confrontation. Extraocular motion is full with no nystagmus. There is no ptosis. Facial sensation is intact. Facial musculature is symmetric. Palate elevates symmetrically. Tongue is midline.    Motor: There is normal bulk and tone.  There is no tremor.  Strength is 5/5 in the right arm and leg.   Strength is 5/5 in the left arm and leg.    Sensation: Intact to light touch and pin in 4 extremities    Reflexes: 1-2+ throughout and plantar responses are flexor.    Cerebellar: There is no dysmetria on finger to nose testing.    Gait : deferred      [facility] SS:  DATE:  TIME:    1A: Level of consciousness (0-3):   1B: Questions (0-2):   1C: Commands (0-2):   2: Gaze (0-2):   3: Visual fields (0-3):   4: Facial palsy (0-3):     MOTOR:  5A: Left arm motor drift (0-4):   5B: Right arm motor drift (0-4):   6A: Left leg motor drift (0-4):   6B: Right leg motor drift (0-4):   7: Limb ataxia (0-2):     SENSORY:  8: Sensation (0-2):     SPEECH:  9: Language (0-3):   10: Dysarthria (0-2):     EXTINCTION:  11: Extinction/inattention (0-2):     TOTAL SCORE:     prehospital mRS =      LABS:                         15.0   17.00 )-----------( 186      ( 23 Dec 2024 03:39 )             45.3       12-23    142  |  108  |  21.5[H]  ----------------------------<  142[H]  5.0   |  23.0  |  1.13    Ca    8.2[L]      23 Dec 2024 03:39  Phos  3.9     12-23  Mg     2.3     12-23    TPro  6.4[L]  /  Alb  3.7  /  TBili  0.7  /  DBili  x   /  AST  32  /  ALT  22  /  AlkPhos  63  12-21    PT/INR - ( 22 Dec 2024 02:27 )   PT: 11.7 sec;   INR: 1.04 ratio      PTT - ( 22 Dec 2024 02:27 )  PTT:30.8 sec    12-21 Chol 159 LDL -88- HDL 37[L] Trig 168[H]    A1C: 6.0      RADIOLOGY & ADDITIONAL STUDIES (independently reviewed unless otherwise noted):    CT Head No Cont (12.22.24 @ 06:42)  IMPRESSION: No change since 12/21/2024 at 10:47 PM. Left suboccipital   craniectomy with postoperative changes and residual hemorrhage in the   cerebellar vermis. Intraventricular air and hemorrhage. Mild ventricular dilatation.    CT Head No Cont (12.21.24 @ 22:48)  IMPRESSION:  1. Decompression of the hematoma in the cerebellum, without interval rebleeding.    CT Head No Cont (12.21.24 @ 18:20)  IMPRESSION: Midline posterior fossa cerebellar hemorrhage is   re-demonstrated measuring 4.2 x 3.4 x 4.0 cm, mildly larger in size.   Severe mass effect on the fourth ventricle. Mild dissection of hemorrhage   into the fourth ventricle. Mild hydrocephalus. In the intraventricular   hemorrhage in the bilateral occipital horns. Small anterior left frontal   convexity subdural hygroma measuring 2.2 mm in thickness. Left-to-right   midline shift is 3.6 mm. Mild subarachnoid hemorrhage in the left cerebellum. Preliminary note, official note pending attending review/signature.  Seen and examined by Stroke team attending/team, assessment/ plan as discussed with stroke team attending/team as noted.     [facility] Stroke Team      HPI:  The pt is a 69y M PMHx ischemic stroke (2018), atrial fibrillation on Eliquis, ETOH abuse, HTN, HLD, frequent UTI, hx kidney stones, s/p TURP, s/p CABG x2, and babesiasis sepsis in 06/2024 who [place] [facility] [place] complaining of dizziness while driving with associated nausea and confusion. [place] at [facility] [place] GCS was noted to be 13. (+) vomiting. CT imaging revealed large cerebellar hemorrhage w/ severe mass effect and mild dissection into the fourth ventricle; associated hydrocephalus, 3.6mm left-to-right midline shift, b/l occipital horn intraventricular hemorrhage and mild SAH in the Left cerebellum. Also notable for 2.2mm left frontal convexity subdural hygroma. Patient was subsequently intubated and transferred to [facility] for possible suboccipital craniotomy. Upon arrival patient was sedated on Versed, Propofol and Fentanyl; admitted to NSICU. Now s/p decompressive SOC 12/21/2024.      PAST MEDICAL & SURGICAL HISTORY:  Calculus of kidney  Hypertension  Hyperlipidemia  Stroke  History of hip replacement, total, left      MEDICATIONS  (STANDING):  atorvastatin 40 milliGRAM(s) Oral at bedtime  chlorhexidine 0.12% Liquid 15 milliLiter(s) Oral Mucosa every 12 hours  chlorhexidine 2% Cloths 1 Application(s) Topical daily  dexMEDEtomidine Infusion 0.2 MICROgram(s)/kG/Hr (5.09 mL/Hr) IV Continuous <Continuous>  dextrose 5%. 1000 milliLiter(s) (100 mL/Hr) IV Continuous <Continuous>  dextrose 5%. 1000 milliLiter(s) (50 mL/Hr) IV Continuous <Continuous>  dextrose 50% Injectable 25 Gram(s) IV Push once  dextrose 50% Injectable 12.5 Gram(s) IV Push once  dextrose 50% Injectable 25 Gram(s) IV Push once  enoxaparin Injectable 40 milliGRAM(s) SubCutaneous every 24 hours  etomidate Injectable 20 milliGRAM(s) IV Push once  folic acid Injectable 1 milliGRAM(s) IV Push daily  influenza  Vaccine (HIGH DOSE) 0.5 milliLiter(s) IntraMuscular once  insulin lispro (ADMELOG) corrective regimen sliding scale   SubCutaneous every 6 hours  niCARdipine Infusion 5 mG/Hr (25 mL/Hr) IV Continuous <Continuous>  pantoprazole  Injectable 40 milliGRAM(s) IV Push daily  propofol Infusion 10 MICROgram(s)/kG/Min (6.1 mL/Hr) IV Continuous <Continuous>  propofol Injectable 30 milliGRAM(s) IV Push once  rocuronium Injectable 100 milliGRAM(s) IV Push once  sodium chloride 0.9%. 1000 milliLiter(s) (75 mL/Hr) IV Continuous <Continuous>  thiamine IVPB 500 milliGRAM(s) IV Intermittent every 8 hours    MEDICATIONS  (PRN):  hydrALAZINE Injectable 10 milliGRAM(s) IV Push every 2 hours PRN SBP >160  labetalol Injectable 10 milliGRAM(s) IV Push every 2 hours PRN Systolic blood pressure > 160  LORazepam   Injectable 1 milliGRAM(s) IV Push every 2 hours PRN CIWA-Ar score increase by 2 points and a total score of 7 or less  ondansetron Injectable 4 milliGRAM(s) IV Push every 6 hours PRN Nausea and/or Vomiting      Allergies  rocuronium (Rash (Severe))    SOCIAL HISTORY:  no tob,   ETOH abuse     FAMILY HISTORY:  Family history of kidney stones (Father)    ROS: 14 point ROS negative other than what is present in HPI or below    Vital Signs Last 24 Hrs  T(C): 37.5 (23 Dec 2024 08:30), Max: 37.6 (22 Dec 2024 16:45)  T(F): 99.5 (23 Dec 2024 08:30), Max: 99.7 (22 Dec 2024 16:45)  HR: 100 (23 Dec 2024 08:30) (54 - 100)  BP: 186/96 (23 Dec 2024 08:30) (101/58 - 186/96)  BP(mean): 122 (23 Dec 2024 08:30) (70 - 148)  RR: 29 (23 Dec 2024 08:30) (16 - 29)  SpO2: 100% (23 Dec 2024 08:30) (93% - 100%)    Parameters below as of 22 Dec 2024 16:00  Patient On (Oxygen Delivery Method): room air      Physical Exam:  General: No acute distress. Intubated and sedated.       Detailed Neurologic Exam:    Mental status: Intubated and sedated. Gag and cough intact.     Cranial nerves: Pupils equal and react symmetrically to light, sluggish. (+) corneal reflexes b/l.     Motor: deferred    Sensation:  RUE/LUE 0/5 to painful stimuli.  RLE/LLE very subtle withdrawal to painful stimuli.     Cerebellar: deferred    Gait : deferred      [facility] SS:  DATE: 12/23/24  TIME: 1200    1A: Level of consciousness (0-3):   1B: Questions (0-2):   1C: Commands (0-2):   2: Gaze (0-2):   3: Visual fields (0-3):   4: Facial palsy (0-3):     MOTOR:  5A: Left arm motor drift (0-4):   5B: Right arm motor drift (0-4):   6A: Left leg motor drift (0-4):   6B: Right leg motor drift (0-4):   7: Limb ataxia (0-2):     SENSORY:  8: Sensation (0-2):     SPEECH:  9: Language (0-3):   10: Dysarthria (0-2):     EXTINCTION:  11: Extinction/inattention (0-2):     TOTAL SCORE:     prehospital mRS =      LABS:                         15.0   17.00 )-----------( 186      ( 23 Dec 2024 03:39 )             45.3       12-23    142  |  108  |  21.5[H]  ----------------------------<  142[H]  5.0   |  23.0  |  1.13    Ca    8.2[L]      23 Dec 2024 03:39  Phos  3.9     12-23  Mg     2.3     12-23    TPro  6.4[L]  /  Alb  3.7  /  TBili  0.7  /  DBili  x   /  AST  32  /  ALT  22  /  AlkPhos  63  12-21    PT/INR - ( 22 Dec 2024 02:27 )   PT: 11.7 sec;   INR: 1.04 ratio      PTT - ( 22 Dec 2024 02:27 )  PTT:30.8 sec    12-21 Chol 159 LDL -88- HDL 37[L] Trig 168[H]    A1C: 6.0      RADIOLOGY & ADDITIONAL STUDIES (independently reviewed unless otherwise noted):    CT Head No Cont (12.22.24 @ 06:42)  IMPRESSION: No change since 12/21/2024 at 10:47 PM. Left suboccipital   craniectomy with postoperative changes and residual hemorrhage in the   cerebellar vermis. Intraventricular air and hemorrhage. Mild ventricular dilatation.    CT Head No Cont (12.21.24 @ 22:48)  IMPRESSION:  1. Decompression of the hematoma in the cerebellum, without interval rebleeding.    CT Head No Cont (12.21.24 @ 18:20)  IMPRESSION: Midline posterior fossa cerebellar hemorrhage is   re-demonstrated measuring 4.2 x 3.4 x 4.0 cm, mildly larger in size.   Severe mass effect on the fourth ventricle. Mild dissection of hemorrhage   into the fourth ventricle. Mild hydrocephalus. In the intraventricular   hemorrhage in the bilateral occipital horns. Small anterior left frontal   convexity subdural hygroma measuring 2.2 mm in thickness. Left-to-right   midline shift is 3.6 mm. Mild subarachnoid hemorrhage in the left cerebellum. Preliminary note, official note pending attending review/signature.  Seen and examined by Stroke team attending/team, assessment/ plan as discussed with stroke team attending/team as noted.     [facility] Stroke Team      HPI:  The pt is a 69y M PMHx ischemic stroke (2018), atrial fibrillation on Eliquis, ETOH abuse, HTN, HLD, frequent UTI, hx kidney stones, s/p TURP, s/p CABG x2, and babesiasis sepsis in 06/2024 who [place] [facility] [place] complaining of dizziness while driving with associated nausea and confusion. [place] [facility] [place] was noted to be 13. (+) vomiting. CT imaging revealed large cerebellar hemorrhage w/ severe mass effect and mild dissection into the fourth ventricle; associated hydrocephalus, 3.6mm left-to-right midline shift, b/l occipital horn intraventricular hemorrhage and mild SAH in the Left cerebellum. Also notable for 2.2mm left frontal convexity subdural hygroma. Patient was subsequently intubated and transferred to [facility] for possible suboccipital craniectomy. Upon arrival patient was sedated on Versed, Propofol and Fentanyl; admitted to NSICU. Now s/p decompressive SOC 12/21/2024.      PAST MEDICAL & SURGICAL HISTORY:  Calculus of kidney  Hypertension  Hyperlipidemia  Stroke  History of hip replacement, total, left      MEDICATIONS  (STANDING):  atorvastatin 40 milliGRAM(s) Oral at bedtime  chlorhexidine 0.12% Liquid 15 milliLiter(s) Oral Mucosa every 12 hours  chlorhexidine 2% Cloths 1 Application(s) Topical daily  dexMEDEtomidine Infusion 0.2 MICROgram(s)/kG/Hr (5.09 mL/Hr) IV Continuous <Continuous>  dextrose 5%. 1000 milliLiter(s) (100 mL/Hr) IV Continuous <Continuous>  dextrose 5%. 1000 milliLiter(s) (50 mL/Hr) IV Continuous <Continuous>  dextrose 50% Injectable 25 Gram(s) IV Push once  dextrose 50% Injectable 12.5 Gram(s) IV Push once  dextrose 50% Injectable 25 Gram(s) IV Push once  enoxaparin Injectable 40 milliGRAM(s) SubCutaneous every 24 hours  etomidate Injectable 20 milliGRAM(s) IV Push once  folic acid Injectable 1 milliGRAM(s) IV Push daily  influenza  Vaccine (HIGH DOSE) 0.5 milliLiter(s) IntraMuscular once  insulin lispro (ADMELOG) corrective regimen sliding scale   SubCutaneous every 6 hours  niCARdipine Infusion 5 mG/Hr (25 mL/Hr) IV Continuous <Continuous>  pantoprazole  Injectable 40 milliGRAM(s) IV Push daily  propofol Infusion 10 MICROgram(s)/kG/Min (6.1 mL/Hr) IV Continuous <Continuous>  propofol Injectable 30 milliGRAM(s) IV Push once  rocuronium Injectable 100 milliGRAM(s) IV Push once  sodium chloride 0.9%. 1000 milliLiter(s) (75 mL/Hr) IV Continuous <Continuous>  thiamine IVPB 500 milliGRAM(s) IV Intermittent every 8 hours    MEDICATIONS  (PRN):  hydrALAZINE Injectable 10 milliGRAM(s) IV Push every 2 hours PRN SBP >160  labetalol Injectable 10 milliGRAM(s) IV Push every 2 hours PRN Systolic blood pressure > 160  LORazepam   Injectable 1 milliGRAM(s) IV Push every 2 hours PRN CIWA-Ar score increase by 2 points and a total score of 7 or less  ondansetron Injectable 4 milliGRAM(s) IV Push every 6 hours PRN Nausea and/or Vomiting      Allergies  rocuronium (Rash (Severe))    SOCIAL HISTORY:  no tob,   ETOH abuse     FAMILY HISTORY:  Family history of kidney stones (Father)    ROS: 14 point ROS negative other than what is present in HPI or below    Vital Signs Last 24 Hrs  T(C): 37.5 (23 Dec 2024 08:30), Max: 37.6 (22 Dec 2024 16:45)  T(F): 99.5 (23 Dec 2024 08:30), Max: 99.7 (22 Dec 2024 16:45)  HR: 100 (23 Dec 2024 08:30) (54 - 100)  BP: 186/96 (23 Dec 2024 08:30) (101/58 - 186/96)  BP(mean): 122 (23 Dec 2024 08:30) (70 - 148)  RR: 29 (23 Dec 2024 08:30) (16 - 29)  SpO2: 100% (23 Dec 2024 08:30) (93% - 100%)    Parameters below as of 22 Dec 2024 16:00  Patient On (Oxygen Delivery Method): room air      Physical Exam:  General: No acute distress. Intubated and sedated.       Detailed Neurologic Exam:    Mental status: Intubated and sedated. Gag and cough intact.     Cranial nerves: Pupils equal and react symmetrically to light, sluggish. (+) corneal reflexes b/l.     Motor: deferred    Sensation:  RUE/LUE 0/5 to painful stimuli.  RLE/LLE very subtle withdrawal to painful stimuli.     Cerebellar: deferred    Gait : deferred      [facility] SS: 27  DATE: 12/23/24  TIME: 1200    1A: Level of consciousness (0-3): 3  1B: Questions (0-2): 1  1C: Commands (0-2): 2  2: Gaze (0-2): 0, limited exam  3: Visual fields (0-3): 0  4: Facial palsy (0-3): 0    MOTOR:  5A: Left arm motor drift (0-4): 4  5B: Right arm motor drift (0-4): 4  6A: Left leg motor drift (0-4): 4  6B: Right leg motor drift (0-4): 4  7: Limb ataxia (0-2): 0, intubated    SENSORY:  8: Sensation (0-2): 2    SPEECH:  9: Language (0-3): 3  10: Dysarthria (0-2): 0, intubated    EXTINCTION:  11: Extinction/inattention (0-2): 0, intubated    TOTAL SCORE: 27      LABS:                         15.0   17.00 )-----------( 186      ( 23 Dec 2024 03:39 )             45.3       12-23    142  |  108  |  21.5[H]  ----------------------------<  142[H]  5.0   |  23.0  |  1.13    Ca    8.2[L]      23 Dec 2024 03:39  Phos  3.9     12-23  Mg     2.3     12-23    TPro  6.4[L]  /  Alb  3.7  /  TBili  0.7  /  DBili  x   /  AST  32  /  ALT  22  /  AlkPhos  63  12-21    PT/INR - ( 22 Dec 2024 02:27 )   PT: 11.7 sec;   INR: 1.04 ratio      PTT - ( 22 Dec 2024 02:27 )  PTT:30.8 sec    12-21 Chol 159 LDL -88- HDL 37[L] Trig 168[H]    A1C: 6.0      RADIOLOGY & ADDITIONAL STUDIES (independently reviewed unless otherwise noted):    CT Head No Cont (12.22.24 @ 06:42)  IMPRESSION: No change since 12/21/2024 at 10:47 PM. Left suboccipital   craniectomy with postoperative changes and residual hemorrhage in the   cerebellar vermis. Intraventricular air and hemorrhage. Mild ventricular dilatation.    CT Head No Cont (12.21.24 @ 22:48)  IMPRESSION:  1. Decompression of the hematoma in the cerebellum, without interval rebleeding.    CT Head No Cont (12.21.24 @ 18:20)  IMPRESSION: Midline posterior fossa cerebellar hemorrhage is   re-demonstrated measuring 4.2 x 3.4 x 4.0 cm, mildly larger in size.   Severe mass effect on the fourth ventricle. Mild dissection of hemorrhage   into the fourth ventricle. Mild hydrocephalus. In the intraventricular   hemorrhage in the bilateral occipital horns. Small anterior left frontal   convexity subdural hygroma measuring 2.2 mm in thickness. Left-to-right   midline shift is 3.6 mm. Mild subarachnoid hemorrhage in the left cerebellum.   [facility] Stroke Team      HPI:  The pt is a 69y M PMHx ischemic stroke (2018), atrial fibrillation on Eliquis, ETOH abuse, HTN, HLD, frequent UTI, hx kidney stones, s/p TURP, s/p CABG x2, and babesiasis sepsis in 06/2024 who initially presented to [facility] [place] on 12/21/24 complaining of dizziness while driving with associated nausea and confusion. While at [facility] [place], GCS was noted to be 13. (+) vomiting. CT imaging revealed large cerebellar hemorrhage w/ severe mass effect and mild dissection into the fourth ventricle; associated hydrocephalus, 3.6mm left-to-right midline shift, b/l occipital horn intraventricular hemorrhage and mild SAH in the Left cerebellum. Also notable for 2.2mm left frontal convexity subdural hygroma. Patient was subsequently intubated and transferred to [facility] for possible suboccipital craniectomy. Upon arrival patient was sedated on Versed, Propofol and Fentanyl; admitted to NSICU. Now s/p decompressive SOC 12/21/2024.      PAST MEDICAL & SURGICAL HISTORY:  Calculus of kidney  Hypertension  Hyperlipidemia  Stroke  History of hip replacement, total, left      MEDICATIONS  (STANDING):  atorvastatin 40 milliGRAM(s) Oral at bedtime  chlorhexidine 0.12% Liquid 15 milliLiter(s) Oral Mucosa every 12 hours  chlorhexidine 2% Cloths 1 Application(s) Topical daily  dexMEDEtomidine Infusion 0.2 MICROgram(s)/kG/Hr (5.09 mL/Hr) IV Continuous <Continuous>  dextrose 5%. 1000 milliLiter(s) (100 mL/Hr) IV Continuous <Continuous>  dextrose 5%. 1000 milliLiter(s) (50 mL/Hr) IV Continuous <Continuous>  dextrose 50% Injectable 25 Gram(s) IV Push once  dextrose 50% Injectable 12.5 Gram(s) IV Push once  dextrose 50% Injectable 25 Gram(s) IV Push once  enoxaparin Injectable 40 milliGRAM(s) SubCutaneous every 24 hours  etomidate Injectable 20 milliGRAM(s) IV Push once  folic acid Injectable 1 milliGRAM(s) IV Push daily  influenza  Vaccine (HIGH DOSE) 0.5 milliLiter(s) IntraMuscular once  insulin lispro (ADMELOG) corrective regimen sliding scale   SubCutaneous every 6 hours  niCARdipine Infusion 5 mG/Hr (25 mL/Hr) IV Continuous <Continuous>  pantoprazole  Injectable 40 milliGRAM(s) IV Push daily  propofol Infusion 10 MICROgram(s)/kG/Min (6.1 mL/Hr) IV Continuous <Continuous>  propofol Injectable 30 milliGRAM(s) IV Push once  rocuronium Injectable 100 milliGRAM(s) IV Push once  sodium chloride 0.9%. 1000 milliLiter(s) (75 mL/Hr) IV Continuous <Continuous>  thiamine IVPB 500 milliGRAM(s) IV Intermittent every 8 hours    MEDICATIONS  (PRN):  hydrALAZINE Injectable 10 milliGRAM(s) IV Push every 2 hours PRN SBP >160  labetalol Injectable 10 milliGRAM(s) IV Push every 2 hours PRN Systolic blood pressure > 160  LORazepam   Injectable 1 milliGRAM(s) IV Push every 2 hours PRN CIWA-Ar score increase by 2 points and a total score of 7 or less  ondansetron Injectable 4 milliGRAM(s) IV Push every 6 hours PRN Nausea and/or Vomiting      Allergies  rocuronium (Rash (Severe))    SOCIAL HISTORY:  no tob,   ETOH abuse     FAMILY HISTORY:  Family history of kidney stones (Father)    ROS: 14 point ROS negative other than what is present in HPI or below    Vital Signs Last 24 Hrs  T(C): 37.5 (23 Dec 2024 08:30), Max: 37.6 (22 Dec 2024 16:45)  T(F): 99.5 (23 Dec 2024 08:30), Max: 99.7 (22 Dec 2024 16:45)  HR: 100 (23 Dec 2024 08:30) (54 - 100)  BP: 186/96 (23 Dec 2024 08:30) (101/58 - 186/96)  BP(mean): 122 (23 Dec 2024 08:30) (70 - 148)  RR: 29 (23 Dec 2024 08:30) (16 - 29)  SpO2: 100% (23 Dec 2024 08:30) (93% - 100%)    Parameters below as of 22 Dec 2024 16:00  Patient On (Oxygen Delivery Method): room air      Physical Exam:  General: No acute distress. Intubated and sedated.       Detailed Neurologic Exam:    Mental status: Intubated and sedated. Gag and cough intact.     Cranial nerves: Pupils equal and react symmetrically to light, sluggish. (+) corneal reflexes b/l.     Motor: deferred    Sensation:  RUE/LUE 0/5 to painful stimuli.  RLE/LLE very subtle withdrawal to painful stimuli.     Cerebellar: deferred    Gait : deferred      [facility] SS: 27  DATE: 12/23/24  TIME: 1200    1A: Level of consciousness (0-3): 3  1B: Questions (0-2): 1  1C: Commands (0-2): 2  2: Gaze (0-2): 0, limited exam  3: Visual fields (0-3): 0  4: Facial palsy (0-3): 0    MOTOR:  5A: Left arm motor drift (0-4): 4  5B: Right arm motor drift (0-4): 4  6A: Left leg motor drift (0-4): 4  6B: Right leg motor drift (0-4): 4  7: Limb ataxia (0-2): 0, intubated    SENSORY:  8: Sensation (0-2): 2    SPEECH:  9: Language (0-3): 3  10: Dysarthria (0-2): 0, intubated    EXTINCTION:  11: Extinction/inattention (0-2): 0, intubated    TOTAL SCORE: 27      LABS:                         15.0   17.00 )-----------( 186      ( 23 Dec 2024 03:39 )             45.3       12-23    142  |  108  |  21.5[H]  ----------------------------<  142[H]  5.0   |  23.0  |  1.13    Ca    8.2[L]      23 Dec 2024 03:39  Phos  3.9     12-23  Mg     2.3     12-23    TPro  6.4[L]  /  Alb  3.7  /  TBili  0.7  /  DBili  x   /  AST  32  /  ALT  22  /  AlkPhos  63  12-21    PT/INR - ( 22 Dec 2024 02:27 )   PT: 11.7 sec;   INR: 1.04 ratio      PTT - ( 22 Dec 2024 02:27 )  PTT:30.8 sec    12-21 Chol 159 LDL -88- HDL 37[L] Trig 168[H]    A1C: 6.0      RADIOLOGY & ADDITIONAL STUDIES (independently reviewed unless otherwise noted):    CT Head No Cont (12.22.24 @ 06:42)  IMPRESSION: No change since 12/21/2024 at 10:47 PM. Left suboccipital   craniectomy with postoperative changes and residual hemorrhage in the   cerebellar vermis. Intraventricular air and hemorrhage. Mild ventricular dilatation.    CT Head No Cont (12.21.24 @ 22:48)  IMPRESSION:  1. Decompression of the hematoma in the cerebellum, without interval rebleeding.    CT Head No Cont (12.21.24 @ 18:20)  IMPRESSION: Midline posterior fossa cerebellar hemorrhage is   re-demonstrated measuring 4.2 x 3.4 x 4.0 cm, mildly larger in size.   Severe mass effect on the fourth ventricle. Mild dissection of hemorrhage   into the fourth ventricle. Mild hydrocephalus. In the intraventricular   hemorrhage in the bilateral occipital horns. Small anterior left frontal   convexity subdural hygroma measuring 2.2 mm in thickness. Left-to-right   midline shift is 3.6 mm. Mild subarachnoid hemorrhage in the left cerebellum.

## 2024-12-23 NOTE — PROGRESS NOTE ADULT - SUBJECTIVE AND OBJECTIVE BOX
69 yr old male history of htn, hld , on afib eliquis with prior ischemic stroke 2018, frequent utis, kidney stones, turp, cabg x 2, 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  went to [place], ich, vomited gcs 13 intubated.  Transferred to [facility] for possible SOC.  On arrival patient is sedated on versed, propofol and fent. (21 Dec 2024 16:13)    24hr EVENTS:  agitated, combative overnight, with signs of EtOH wdrl; required PHB IV;  this morning with increased WOB, desaturation events; required re-intubation.     ROS: [x ]  Unable to assess due to mental status     -----------------------------------------------------------------------------------------------------------------------------------------------------------------------------------    ICU Vital Signs Last 24 Hrs  T(C): 37.3 (23 Dec 2024 11:15), Max: 37.8 (23 Dec 2024 09:00)  T(F): 99.1 (23 Dec 2024 11:15), Max: 100 (23 Dec 2024 09:00)  HR: 64 (23 Dec 2024 12:20) (57 - 101)  BP: 131/70 (23 Dec 2024 11:15) (121/65 - 222/105)  BP(mean): 89 (23 Dec 2024 11:15) (81 - 137)  RR: 18 (23 Dec 2024 11:15) (0 - 31)  SpO2: 100% (23 Dec 2024 12:20) (93% - 100%)    O2 Parameters below as of 22 Dec 2024 16:00  Patient On (Oxygen Delivery Method): room air      -----------------------------------------------------------------------------------------------------------------------------------------------------------------------------------    PHYSICAL EXAM: post intubation;  General: Calm, intubated  Neuro:  -Mental status- not FC, no EO, corneals +, PERRL, cough present, o/b the vent.  moves BL UE spont, BL LE wdrl.    CV: S1S2 present  Pulm: clear to auscultation BL  Abd: Soft, nontender, nondistended  Ext: no noted edema in lower ext  Skin: warm, dry

## 2024-12-23 NOTE — PROGRESS NOTE ADULT - ASSESSMENT
Assessment: 69M PMH HTN, HLD, A fib on Eliquis, Ischemic stroke 2018, TURP, CAD, CABG x2, kidney stones, ETOH, [place] to [facility] with [place]iness, found to have cerebellar ICH.   GCS 13 on arrival but began vomiting so intubated for airway protection & transferred to [facility].  now S/p suboccipital craniectomy with clot evacuation. Exam post op with improvement.    PLAN  - Q1 neuro checks  - Na goal is normonatremia  - SBP<160, on cardene   - CT in AM ordered for 12/23  - HOB >30  - Ancef x 24 hours  - no drains  - Pt with alvares  - dvt ppx- scds, lovenox POD#1 started    case and plan to be discussed with Dr. Delaney on am rounds.    Assessment: 69M PMH HTN, HLD, A fib on Eliquis, Ischemic stroke 2018, TURP, CAD, CABG x2, kidney stones, ETOH, presented to [facility] [place] with [place]iness, found to have cerebellar ICH.   GCS 13 on arrival but began vomiting so intubated for airway protection & transferred to [facility]. POD#1 S/p suboccipital craniectomy with clot evacuation.    PLAN  - Q1 neuro checks  - Na goal is normonatremia  - SBP<160, on cardene   - CT in AM ordered for 12/23  - HOB >30  - Ancef x 24 hours  - no drains  - Pt with alvares  - dvt ppx- scds, lovenox POD#1 started    case and plan to be discussed with Dr. Delaney on am rounds.

## 2024-12-23 NOTE — PROGRESS NOTE ADULT - ASSESSMENT
69 yr old male with acute non-traumatic cerebellar ICH, with vasogenic edema, cerebellar compression;  obstructive hydrocephalus.  Now s/p decompressive SOC 12/21/2024.  Acute hypoxemic resp failure due to EtOH wdrl, neurologic injury. Re-intubated 12/23 .  Suspected KEN, improving.  EtOH wdrl.  PMh of CAD/CABG x2, htn, hld , on afib eliquis with prior ischemic stroke 2018, frequent UTIs, kidney stones, turp, babesiasis sepsis 6/2024, etoh abuse.    Plan:  - neurochecks  - sedation with Propofol gtt  - add Librium PO + PRN IVp Ativan  - add VPA 25- q6hrs, check level and LFT 12/25/2024  - plan for CTh in am  - maintain Osats>92%, normocapnia; SBT as tolerated; obtain AbG, CXR  - maintain -160, avoid significant fluctuations; PRN meds  - HR control - aim for <110; hold metoprolol for now  - hold full AC in view of ICH  - monitor e-lytes, I/Os, IV fluids for now; maintain Na 140-150, BMP q12hrs; cont flomax  - start TF, bowel regimen; PPI for pressure ppx  -monitor FS, aim for 120-180, ISS  -SCDs, SQL for VTE ppx

## 2024-12-23 NOTE — PHARMACOTHERAPY INTERVENTION NOTE - COMMENTS
[facility]    HOME MEDICATIONS:  Eliquis 5 mg oral tablet: 1 tab(s) orally every 12 hours (23 Dec 2024 09:06)  ezetimibe-simvastatin 10 mg-40 mg oral tablet: 1 tab(s) orally once a day (23 Dec 2024 09:07)  Metoprolol Succinate ER 50 mg oral tablet, extended release: 1 tab(s) orally once a day (at bedtime) (23 Dec 2024 09:06)  ramipril 10 mg oral tablet: 10 tablet orally once a day (23 Dec 2024 09:05)  tamsulosin 0.4 mg oral capsule: 1 cap(s) orally once a day (23 Dec 2024 09:06)   Outpatient Medication Review updated using outpatient [facility] med fill records and prior outpatient physician note from 12/19. As per note, patient is currently also taking Aspirin 81 mg daily, Vit D daily, and MVT daily.     HOME MEDICATIONS:  aspirin 81 mg oral delayed release tablet: 1 tab(s) orally once a day (23 Dec 2024 10:36)  cholecalciferol 125 mcg (5000 intl units) oral tablet: 1 tab(s) orally once a day (23 Dec 2024 10:36)  Eliquis 5 mg oral tablet: 1 tab(s) orally every 12 hours (23 Dec 2024 09:06)  ezetimibe-simvastatin 10 mg-40 mg oral tablet: 1 tab(s) orally once a day (23 Dec 2024 09:07)  Metoprolol Succinate ER 50 mg oral tablet, extended release: 1 tab(s) orally once a day (at bedtime) (23 Dec 2024 09:06)  multivitamin: 1 tab(s) orally once a day (23 Dec 2024 10:36)  ramipril 10 mg oral tablet: 10 tablet orally once a day (23 Dec 2024 09:05)  tamsulosin 0.4 mg oral capsule: 1 cap(s) orally once a day (23 Dec 2024 09:06)

## 2024-12-24 NOTE — DIETITIAN INITIAL EVALUATION ADULT - PERTINENT MEDS FT
MEDICATIONS  (STANDING):  atorvastatin 40 milliGRAM(s) Oral at bedtime  bisacodyl Suppository 10 milliGRAM(s) Rectal once  chlordiazePOXIDE 50 milliGRAM(s) Oral every 6 hours  chlorhexidine 0.12% Liquid 15 milliLiter(s) Oral Mucosa every 12 hours  dexMEDEtomidine Infusion 0.2 MICROgram(s)/kG/Hr (5.09 mL/Hr) IV Continuous <Continuous>  dextrose 5%. 1000 milliLiter(s) (100 mL/Hr) IV Continuous <Continuous>  folic acid Injectable 1 milliGRAM(s) IV Push daily  heparin   Injectable 5000 Unit(s) SubCutaneous every 8 hours  influenza  Vaccine (HIGH DOSE) 0.5 milliLiter(s) IntraMuscular once  insulin lispro (ADMELOG) corrective regimen sliding scale   SubCutaneous every 6 hours  pantoprazole  Injectable 40 milliGRAM(s) IV Push daily  piperacillin/tazobactam IVPB.. 3.375 Gram(s) IV Intermittent every 8 hours  polyethylene glycol 3350 17 Gram(s) Oral two times a day  propofol Infusion 10 MICROgram(s)/kG/Min (6.1 mL/Hr) IV Continuous <Continuous>  senna 2 Tablet(s) Oral at bedtime  sodium chloride 0.9%. 1000 milliLiter(s) (50 mL/Hr) IV Continuous <Continuous>  thiamine IVPB 500 milliGRAM(s) IV Intermittent every 8 hours  valproate sodium   IVPB 250 milliGRAM(s) IV Intermittent every 6 hours    MEDICATIONS  (PRN):  fentaNYL    Injectable 25 MICROGram(s) IV Push every 2 hours PRN vent dysschrony  hydrALAZINE Injectable 10 milliGRAM(s) IV Push every 2 hours PRN SBP >160  labetalol Injectable 10 milliGRAM(s) IV Push every 2 hours PRN Systolic blood pressure > 160  LORazepam   Injectable 1 milliGRAM(s) IV Push every 2 hours PRN CIWA-Ar score increase by 2 points and a total score of 7 or less  ondansetron Injectable 4 milliGRAM(s) IV Push every 6 hours PRN Nausea and/or Vomiting

## 2024-12-24 NOTE — PROGRESS NOTE ADULT - SUBJECTIVE AND OBJECTIVE BOX
69 yr old male history of htn, hld , on afib eliquis with prior ischemic stroke 2018, frequent utis, kidney stones, turp, cabg x 2, 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  went to [place], ich, vomited gcs 13 intubated.  Transferred to [facility] for possible SOC.  On arrival patient is sedated on versed, propofol and fent. (21 Dec 2024 16:13)    24hr EVENTS: none reported; improving O2 requirements.    ROS: [x ]  Unable to assess due to mental status     -----------------------------------------------------------------------------------------------------------------------------------------------------------------------------------    ICU Vital Signs Last 24 Hrs  T(C): 38.4 (24 Dec 2024 12:00), Max: 38.4 (24 Dec 2024 04:00)  T(F): 101.1 (24 Dec 2024 12:00), Max: 101.1 (24 Dec 2024 04:00)  HR: 80 (24 Dec 2024 12:00) (66 - 113)  BP: 172/80 (24 Dec 2024 12:00) (104/63 - 185/87)  BP(mean): 106 (24 Dec 2024 12:00) (74 - 117)  ABP: --  ABP(mean): --  RR: 18 (24 Dec 2024 12:00) (16 - 25)  SpO2: 100% (24 Dec 2024 12:00) (93% - 100%)    O2 Parameters below as of 24 Dec 2024 12:00  Patient On (Oxygen Delivery Method): ventilator            -----------------------------------------------------------------------------------------------------------------------------------------------------------------------------------    PHYSICAL EXAM:  General: Calm, intubated, NAD  Neuro:  -Mental status- not FC, no EO, corneals +, weak dolls, PERRL, cough present, o/b the vent.  moves BL UE spont and loc, BL LE spont.    CV: S1S2 present  Pulm: clear to auscultation BL  Abd: Soft, nontender, nondistended  Ext: no noted edema in lower ext  Skin: warm, dry

## 2024-12-24 NOTE — PROGRESS NOTE ADULT - ASSESSMENT
ASSESSMENT:   The pt is a 69y M PMHx ischemic stroke (2018), atrial fibrillation on Eliquis, ETOH abuse, HTN, HLD, frequent UTI, hx kidney stones, s/p TURP, s/p CABG x2, and babesiasis sepsis in 06/2024 who [place] [facility] [place] complaining of dizziness while driving with associated nausea and confusion. [place] at [facility] [place], GCS was noted to be 13. (+) vomiting. CT imaging revealed large cerebellar hemorrhage w/ severe mass effect and mild dissection into the fourth ventricle; associated hydrocephalus, 3.6mm left-to-right midline shift, b/l occipital horn intraventricular hemorrhage, 2.2mm left frontal convexity subdural hygroma and mild SAH in the Left cerebellum. Transferred to [facility] for suboccipital craniectomy.    Impression: Acute non-traumatic cerebellar ICH with vasogenic edema, cerebellar compression and obstructive hydrocephalus, s/p Left decompressive SOC 12/21/24; etiology likely hypertensive with superimposed ETOH abuse vs underlying coagulopathy.     NEURO:   -Neurologically limited exam, intubated, attempting to wean sedation as tolerated    -Continue close monitoring for neurologic deterioration    -Coma neuro checks q 1h  -SBP goal <160, avoiding rapid fluctuations and hypotension.   -ANTITHROMBOTIC THERAPY: currently on hold in setting of ICH.   -Hx AF: Eliquis on hold in setting of ICH, reinitiation timeline per NS however from a stroke neuro standpoint would hold for at least 1 month.   -statin not indicated in the setting of ICH  -MRI Brain w/o once clinically able, not likely to change mgmt at this time  -Dysphagia screen: fail, NGT in place  -Physical therapy/OT/Speech eval/treatment.   -TTE as noted   -Cardiac monitoring w/ telemetry           -DVT ppx: Heparin s.c [] LMWH [X] SCD []    -Maintain adequate hydration    -Na Goal: 135-145   -Monitor for si/sx of infection   -LDL/A1C as above  -Stroke education     OTHER: Condition and plan of care d/w patient, questions and concerns addressed.     DISPOSITION: Rehab or home depending on PT eval once stable and workup is complete      CORE MEASURES:        Admission NIHSS: 27     Tenecteplase : [] YES [X] NO      LDL/A1C: 88 / 6.0     Depression Screen - if depression hx and/or present      Statin Therapy:      Dysphagia Screen: [] PASS [X] FAIL -- NGT     Smoking [] YES [X] NO      Afib [X] YES [] NO     Stroke Education [] YES [] NO [x] PENDING

## 2024-12-24 NOTE — PROGRESS NOTE ADULT - SUBJECTIVE AND OBJECTIVE BOX
INTERVAL HPI/ACUTE EVENTS: 69yMale POD#1 s/p suboccipital craniectomy and clot evacuation, remained extubated post procedure. Patient extubated by NSICU team on 12/22, patient remains on precedex @1.5 and has required PRNs for CIWA protocol and agitation.  Patient remains MORA AG very strong, PERRL, patient unable to participate in the remainder of neuro exam which is unchanged from patient's exam during day shift.  INTERVAL HPI/ACUTE EVENTS: 69yMale POD#1 s/p suboccipital craniectomy and clot evacuation, remained extubated post procedure. Patient extubated by NSICU team on 12/22, patient remains on precedex @1.5 and has required PRNs for CIWA protocol and agitation.  Yesterday patient's exam remained MORA AG very strong, PERRL, patient unable to participate in the remainder of neuro exam which is unchanged from patient's exam post extubation on 1/23.  During day shift on 12/24 patient re-intubated for severe agitation and belly breathing, medications adjusted.  Repeat CTH done and reviewed by neurosurgery team.      Vital Signs Last 24 Hrs  T(C): 38.2 (24 Dec 2024 01:00), Max: 38.2 (24 Dec 2024 01:00)  T(F): 100.8 (24 Dec 2024 01:00), Max: 100.8 (24 Dec 2024 01:00)  HR: 89 (24 Dec 2024 01:00) (61 - 113)  BP: 151/78 (24 Dec 2024 01:00) (107/75 - 222/105)  BP(mean): 89 (24 Dec 2024 01:00) (74 - 137)  RR: 19 (24 Dec 2024 01:00) (0 - 31)  SpO2: 98% (24 Dec 2024 01:00) (93% - 100%)    Physical Exam:  General: NAD  Cardio: NSR on monitor  Pulm: extubated on 4L NC  Abdomen: soft, non-distended  Neuro: PERRL, localizes in BUE to noxious stimuli, BLE withdraws to pain. Does not follow commands, patient unable to participate in remainder of neuro exam. +cough/+gag/+corneals, flap full but soft.    RADIOLOGY:  < from: CT Head No Cont (12.23.24 @ 09:19) >ACC: [id number] EXAM:  CT BRAIN   ORDERED BY: OMERO APONTE -PROCEDURE DATE:  12/23/2024    INTERPRETATION:  CLINICAL INFORMATION: s/p SOC    COMPARISON: None.    Multiple axial sections were performed from the base of skull to vertex   without contrast enhancement. Coronal and sagittal reconstructions were   performed    This exam is compared prior head CT performed on December 22, 2024    Postoperative changes compatible suboccipital craniectomy is identified.   Abnormal areas of low-attenuation involving the cerebellar vermis and   right cerebellar region are again seen with scattered areas of associated   high attenuation which could be compatible with some hemorrhagic   components. These findings were present on prior study. There is evidence   of extra-axial collection identified in the postop region which measures   approximately 2.8 cm in widest diameter and previously measured   approximately 2.9 cm widest diameter. This is compatible with a   pseudomeningocele. These findings can be further characterized with   contrast enhanced MRI of the brain if there are no contraindications.    Mass effect on the fourth ventricle is identified. Dilated lateral   ventricles are again identified. Intraventricular hemorrhage and   intraventricular air is again seen.    There is evidence of bilateral subdural collections again identified.   Associated subdural air is identified as well. The subdural collection on   the left side measures approximately 0.7 cm in widest diameter and   previously measured approximately 0.8 cm in widest diameter. The subdural   collection on the right side measures approximate 0.6 cm in widest   diameter and previously measured approximately 0.6 cm in widest diameter.   Previously noted areas of subarachnoid hemorrhage involving the right   parietal region is less conspicuous which is compatible with expected   evolutionary changes    Left maxillary polyp versus retention cyst is seen.    Left side NG tube is seen.    Both mastoid and meniscal clear.    IMPRESSION: Postop changes again identified with abnormal low-attenuation   involving the posterior fossa region associated hemorrhage. 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    There are bilateral subdural collections again identified.    Dilated lateral ventricles are seen with intraventricular air and   hemorrhage.    --- End of Report ---    JONNY CHADWICK MD; Attending Radiologist  This document has been electronically signed. Dec 23 2024 11:40AM    < end of copied text >    < from: CT Head No Cont (12.22.24 @ 06:42) >ACC: [id number] EXAM:  CT BRAIN   ORDERED BY: KAYLEIGH FELICIANO -PROCEDURE DATE:  12/22/2024    INTERPRETATION:  CLINICAL INFORMATION: intracranial hemorrhage      interval imaging      5mm axial sections of the brain were obtained from base to vertex,   without the intravenous administration of contrast material. Coronal and   sagittal computer generated reconstructed views are available.    Comparison is made with the prior CT of 12/21/2024 at 10:47 PM.    There has been a left suboccipital craniectomy. The large midline vermian   mass previously measuring 3 x 4.7 cm has been removed. There is a small   amount of residual hemorrhage, postoperative changes with lucency. There   is residual mass effect on the fourth ventricle. Hemorrhage layers in the   occipital horns. There is air in the frontal horns. Subarachnoid air and   subdural air is identified in the frontal regions. Ventricles are mildly   dilated.    IMPRESSION: No change since 12/21/2024 at 10:47 PM. Left suboccipital   craniectomy with postoperative changes and residual hemorrhage in the   cerebellar vermis. Intraventricular air and hemorrhage. Mild ventricular   dilatation.    --- End of Report ---    DEANAN DRISCOLL MD; Attending Radiologist  This document has been electronically signed. Dec 22 2024  7:53AM    < end of copied text >    LABS:                        15.0   17.00 )-----------( 186      ( 23 Dec 2024 03:39 )             45.3       12-23    142  |  108  |  27.3[H]  ----------------------------<  164[H]  4.1   |  22.0  |  1.35[H]    Ca    8.7      23 Dec 2024 22:26  Phos  3.9     12-23  Mg     2.3     12-23          Urinalysis Basic - ( 23 Dec 2024 22:26 )  Color: x / Appearance: x / SG: x / pH: x  Gluc: 164 mg/dL / Ketone: x  / Bili: x / Urobili: x   Blood: x / Protein: x / Nitrite: x   Leuk Esterase: x / RBC: x / WBC x   Sq Epi: x / Non Sq Epi: x / Bacteria: x      PT/INR - ( 22 Dec 2024 02:27 )   PT: 11.7 sec;   INR: 1.04 ratio    PTT - ( 22 Dec 2024 02:27 )  PTT:30.8 sec      CAPILLARY BLOOD GLUCOSE  POCT Blood Glucose.: 182 mg/dL (24 Dec 2024 00:43)

## 2024-12-24 NOTE — PROGRESS NOTE ADULT - ASSESSMENT
Assessment: 69M PMH HTN, HLD, A fib on Eliquis, Ischemic stroke 2018, TURP, CAD, CABG x2, kidney stones, ETOH, presented to [facility] [place] with dizziness, found to have cerebellar ICH. GCS 13 on arrival but began vomiting so intubated for airway protection & transferred to [facility]. POD#3 S/p suboccipital craniectomy with clot evacuation.    PLAN  - Q1 neuro checks  - Na goal is normonatremia  - SBP<160, on cardene   - CTH on 12/23 done and reviewed with Nsx team  - HOB >30  - Ancef x 24 hours  - no drains  - Pt with alvares  - dvt ppx- scds, lovenox POD#1 started    case and plan to be discussed with Dr. Delaney on am rounds.  Assessment: 69M PMH HTN, HLD, A fib on Eliquis, Ischemic stroke 2018, TURP, CAD, CABG x2, kidney stones, ETOH, [place] [facility] [place], found to have cerebellar ICH. GCS 13 on arrival but began vomiting so intubated for airway protection & transferred to [facility]. POD#3 S/p suboccipital craniectomy with clot evacuation.    PLAN  - Q1 neuro checks  - Na goal is normonatremia  - SBP<160, on cardene   - CTH on 12/23 done and reviewed with Nsx team  - HOB >30  - Ancef x 24 hours  - no drains  - Pt with alvares  - dvt ppx- scds, lovenox POD#1 started    case discussed with Dr. Barreto on am rounds.

## 2024-12-24 NOTE — PROGRESS NOTE ADULT - SUBJECTIVE AND OBJECTIVE BOX
Preliminary note, official recommendations pending attending review/signature   [facility] Stroke Team  Progress Note     HPI:  The pt is a 69y M PMHx ischemic stroke (2018), atrial fibrillation on Eliquis, ETOH abuse, HTN, HLD, frequent UTI, hx kidney stones, s/p TURP, s/p CABG x2, and babesiasis sepsis in 06/2024 who initially presented to [place] on 12/21/24 complaining of dizziness while driving with associated nausea and confusion. While at [place], GCS was noted to be 13. (+) vomiting. CT imaging revealed large cerebellar hemorrhage w/ severe mass effect and mild dissection into the fourth ventricle; associated hydrocephalus, 3.6mm left-to-right midline shift, b/l occipital horn intraventricular hemorrhage and mild SAH in the Left cerebellum. Also notable for 2.2mm left frontal convexity subdural hygroma. Patient was subsequently intubated and transferred to [facility] for possible suboccipital craniectomy. Upon arrival patient was sedated on Versed, Propofol and Fentanyl; admitted to NSICU. Now s/p decompressive SOC 12/21/2024.    SUBJECTIVE: No events overnight.  No new neurologic complaints.  ROS reported negative unless otherwise noted.    MEDICATIONS:  atorvastatin 40 milliGRAM(s) Oral at bedtime  chlordiazePOXIDE 50 milliGRAM(s) Oral every 6 hours  chlorhexidine 0.12% Liquid 15 milliLiter(s) Oral Mucosa every 12 hours  chlorhexidine 2% Cloths 1 Application(s) Topical daily  dexMEDEtomidine Infusion 0.2 MICROgram(s)/kG/Hr IV Continuous <Continuous>  dextrose 5%. 1000 milliLiter(s) IV Continuous <Continuous>  dextrose 5%. 1000 milliLiter(s) IV Continuous <Continuous>  dextrose 50% Injectable 25 Gram(s) IV Push once  dextrose 50% Injectable 12.5 Gram(s) IV Push once  dextrose 50% Injectable 25 Gram(s) IV Push once  doxazosin 4 milliGRAM(s) Oral at bedtime  fentaNYL    Injectable 25 MICROGram(s) IV Push every 2 hours PRN  folic acid Injectable 1 milliGRAM(s) IV Push daily  heparin   Injectable 5000 Unit(s) SubCutaneous every 8 hours  hydrALAZINE Injectable 10 milliGRAM(s) IV Push every 2 hours PRN  influenza  Vaccine (HIGH DOSE) 0.5 milliLiter(s) IntraMuscular once  insulin lispro (ADMELOG) corrective regimen sliding scale   SubCutaneous every 6 hours  labetalol Injectable 10 milliGRAM(s) IV Push every 2 hours PRN  LORazepam   Injectable 1 milliGRAM(s) IV Push every 2 hours PRN  metoprolol tartrate 25 milliGRAM(s) Oral every 12 hours  ondansetron Injectable 4 milliGRAM(s) IV Push every 6 hours PRN  pantoprazole  Injectable 40 milliGRAM(s) IV Push daily  piperacillin/tazobactam IVPB.. 3.375 Gram(s) IV Intermittent every 8 hours  polyethylene glycol 3350 17 Gram(s) Oral two times a day  propofol Infusion 10 MICROgram(s)/kG/Min IV Continuous <Continuous>  senna 2 Tablet(s) Oral at bedtime  sodium chloride 0.9% Bolus 500 milliLiter(s) IV Bolus once  sodium chloride 0.9%. 1000 milliLiter(s) IV Continuous <Continuous>  thiamine IVPB 500 milliGRAM(s) IV Intermittent every 8 hours  valproate sodium   IVPB 250 milliGRAM(s) IV Intermittent every 6 hours       Vital Signs Last 24 Hrs  T(C): 38.4 (24 Dec 2024 12:00), Max: 38.4 (24 Dec 2024 04:00)  T(F): 101.1 (24 Dec 2024 12:00), Max: 101.1 (24 Dec 2024 04:00)  HR: 80 (24 Dec 2024 12:00) (66 - 113)  BP: 172/80 (24 Dec 2024 12:00) (104/63 - 185/87)  BP(mean): 106 (24 Dec 2024 12:00) (74 - 117)  RR: 18 (24 Dec 2024 12:00) (16 - 25)  SpO2: 100% (24 Dec 2024 12:00) (93% - 100%)    Parameters below as of 24 Dec 2024 12:00  Patient On (Oxygen Delivery Method): ventilator      PHYSICAL EXAM:  ***Patient was on propofol gtt, now paused, however sedation may impact neurological exam***  General: No acute distress    NEUROLOGICAL EXAM:    Mental status: Intubated and sedated, propofol paused. Gag and cough intact. Patient not following commands. No verbal output.     Cranial nerves: Pupils equal and react symmetrically to light, sluggish. Dysconjugate gaze. (+) corneal reflexes b/l. +Weak occulocephalics b/l.     Motor: There is no tremor.  3/5 LUE  3/5 RUE  2/5 LLE  2/5 RLE     Sensation: Withdraws in all extremities to painful stimuli    Cerebellar: deferred    Gait : deferred      LABS:                        14.4   17.61 )-----------( 151      ( 24 Dec 2024 02:05 )             44.0    12-24    140  |  108  |  30.9[H]  ----------------------------<  173[H]  4.2   |  21.0[L]  |  1.52[H]    Ca    8.8      24 Dec 2024 02:05  Phos  2.1     12-24  Mg     2.5     12-24    LDL 88  A1C 6.0    IMAGING:   TTE W or WO Ultrasound Enhancing Agent (12.22.24 @ 09:19)  CONCLUSIONS:   1. Left ventricularcavity is mildly dilated. Left ventricular systolic function is normal with an ejection fraction of 53 % by Ac's method of disks with an ejection fraction visually estimated at 50 to 55 %.   2. Moderate left ventricular hypertrophy.   3. There is mild (grade 1) left ventricular diastolic dysfunction, with elevated left ventricular filling pressure.   4. Normal right ventricular cavity size and probably normal right ventricular systolic function.   5. Mild to moderate aortic stenosis.   6. Trace aortic regurgitation.   7. Aortic root at the sinuses of Valsalva is dilated, measuring 4.20 cm (indexed 1.92 cm/m²). Ascending aorta is dilated, measuring 4.00 cm (indexed 1.83 cm/m²).    CT Head No Cont (12.23.24 @ 09:19)  IMPRESSION: Postop changes again identified with abnormal low-attenuation   involving the posterior fossa region associated hemorrhage. 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    There are bilateral subdural collections again identified.  Dilated lateral ventricles are seen with intraventricular air and   hemorrhage.    CT Head No Cont (12.24.24 @ 06:01)   IMPRESSION: Stable follow-up CT exam.  No new areas of acute intracranial hemorrhage are noted.    CT Head No Cont (12.22.24 @ 06:42)  IMPRESSION: No change since 12/21/2024 at 10:47 PM. Left suboccipital   craniectomy with postoperative changes and residual hemorrhage in the   cerebellar vermis. Intraventricular air and hemorrhage. Mild ventricular dilatation.    CT Head No Cont (12.21.24 @ 22:48)  IMPRESSION:  1. Decompression of the hematoma in the cerebellum, without interval rebleeding.    CT Head No Cont (12.21.24 @ 18:20)  IMPRESSION: Midline posterior fossa cerebellar hemorrhage is   re-demonstrated measuring 4.2 x 3.4 x 4.0 cm, mildly larger in size.   Severe mass effect on the fourth ventricle. Mild dissection of hemorrhage   into the fourth ventricle. Mild hydrocephalus. In the intraventricular   hemorrhage in the bilateral occipital horns. Small anterior left frontal   convexity subdural hygroma measuring 2.2 mm in thickness. Left-to-right   midline shift is 3.6 mm. Mild subarachnoid hemorrhage in the left cerebellum.   [facility] Stroke Team  Progress Note     HPI:  The pt is a 69y M PMHx ischemic stroke (2018), atrial fibrillation on Eliquis, ETOH abuse, HTN, HLD, frequent UTI, hx kidney stones, s/p TURP, s/p CABG x2, and babesiasis sepsis in 06/2024 who [place] [facility] [place] complaining of dizziness while driving with associated nausea and confusion. [place] [facility] was noted to be 13. (+) vomiting. CT imaging revealed large cerebellar hemorrhage w/ severe mass effect and mild dissection into the fourth ventricle; associated hydrocephalus, 3.6mm left-to-right midline shift, b/l occipital horn intraventricular hemorrhage and mild SAH in the Left cerebellum. Also notable for 2.2mm left frontal convexity subdural hygroma. Patient was subsequently intubated and transferred to [facility] for possible suboccipital craniectomy. Upon arrival patient was sedated on Versed, Propofol and Fentanyl; admitted to NSICU. Now s/p decompressive SOC 12/21/2024.    SUBJECTIVE: No events overnight.  No new neurologic complaints.  ROS reported negative unless otherwise noted.    MEDICATIONS:  atorvastatin 40 milliGRAM(s) Oral at bedtime  chlordiazePOXIDE 50 milliGRAM(s) Oral every 6 hours  chlorhexidine 0.12% Liquid 15 milliLiter(s) Oral Mucosa every 12 hours  chlorhexidine 2% Cloths 1 Application(s) Topical daily  dexMEDEtomidine Infusion 0.2 MICROgram(s)/kG/Hr IV Continuous <Continuous>  dextrose 5%. 1000 milliLiter(s) IV Continuous <Continuous>  dextrose 5%. 1000 milliLiter(s) IV Continuous <Continuous>  dextrose 50% Injectable 25 Gram(s) IV Push once  dextrose 50% Injectable 12.5 Gram(s) IV Push once  dextrose 50% Injectable 25 Gram(s) IV Push once  doxazosin 4 milliGRAM(s) Oral at bedtime  fentaNYL    Injectable 25 MICROGram(s) IV Push every 2 hours PRN  folic acid Injectable 1 milliGRAM(s) IV Push daily  heparin   Injectable 5000 Unit(s) SubCutaneous every 8 hours  hydrALAZINE Injectable 10 milliGRAM(s) IV Push every 2 hours PRN  influenza  Vaccine (HIGH DOSE) 0.5 milliLiter(s) IntraMuscular once  insulin lispro (ADMELOG) corrective regimen sliding scale   SubCutaneous every 6 hours  labetalol Injectable 10 milliGRAM(s) IV Push every 2 hours PRN  LORazepam   Injectable 1 milliGRAM(s) IV Push every 2 hours PRN  metoprolol tartrate 25 milliGRAM(s) Oral every 12 hours  ondansetron Injectable 4 milliGRAM(s) IV Push every 6 hours PRN  pantoprazole  Injectable 40 milliGRAM(s) IV Push daily  piperacillin/tazobactam IVPB.. 3.375 Gram(s) IV Intermittent every 8 hours  polyethylene glycol 3350 17 Gram(s) Oral two times a day  propofol Infusion 10 MICROgram(s)/kG/Min IV Continuous <Continuous>  senna 2 Tablet(s) Oral at bedtime  sodium chloride 0.9% Bolus 500 milliLiter(s) IV Bolus once  sodium chloride 0.9%. 1000 milliLiter(s) IV Continuous <Continuous>  thiamine IVPB 500 milliGRAM(s) IV Intermittent every 8 hours  valproate sodium   IVPB 250 milliGRAM(s) IV Intermittent every 6 hours       Vital Signs Last 24 Hrs  T(C): 38.4 (24 Dec 2024 12:00), Max: 38.4 (24 Dec 2024 04:00)  T(F): 101.1 (24 Dec 2024 12:00), Max: 101.1 (24 Dec 2024 04:00)  HR: 80 (24 Dec 2024 12:00) (66 - 113)  BP: 172/80 (24 Dec 2024 12:00) (104/63 - 185/87)  BP(mean): 106 (24 Dec 2024 12:00) (74 - 117)  RR: 18 (24 Dec 2024 12:00) (16 - 25)  SpO2: 100% (24 Dec 2024 12:00) (93% - 100%)    Parameters below as of 24 Dec 2024 12:00  Patient On (Oxygen Delivery Method): ventilator      PHYSICAL EXAM:  ***Patient was on propofol gtt, now paused, however sedation may impact neurological exam***  General: No acute distress    NEUROLOGICAL EXAM:    Mental status: Intubated and sedated, propofol paused. Gag and cough intact. Patient not following commands. No verbal output.     Cranial nerves: Pupils equal and react symmetrically to light, sluggish. Dysconjugate gaze. (+) corneal reflexes b/l. +Weak occulocephalics b/l.     Motor: There is no tremor.  3/5 LUE  3/5 RUE  2/5 LLE  2/5 RLE     Sensation: Withdraws in all extremities to painful stimuli except RUE    Cerebellar: deferred    Gait : deferred      LABS:                        14.4   17.61 )-----------( 151      ( 24 Dec 2024 02:05 )             44.0    12-24    140  |  108  |  30.9[H]  ----------------------------<  173[H]  4.2   |  21.0[L]  |  1.52[H]    Ca    8.8      24 Dec 2024 02:05  Phos  2.1     12-24  Mg     2.5     12-24    LDL 88  A1C 6.0    IMAGING:   TTE W or WO Ultrasound Enhancing Agent (12.22.24 @ 09:19)  CONCLUSIONS:   1. Left ventricularcavity is mildly dilated. Left ventricular systolic function is normal with an ejection fraction of 53 % by Ac's method of disks with an ejection fraction visually estimated at 50 to 55 %.   2. Moderate left ventricular hypertrophy.   3. There is mild (grade 1) left ventricular diastolic dysfunction, with elevated left ventricular filling pressure.   4. Normal right ventricular cavity size and probably normal right ventricular systolic function.   5. Mild to moderate aortic stenosis.   6. Trace aortic regurgitation.   7. Aortic root at the sinuses of Valsalva is dilated, measuring 4.20 cm (indexed 1.92 cm/m²). Ascending aorta is dilated, measuring 4.00 cm (indexed 1.83 cm/m²).    CT Head No Cont (12.23.24 @ 09:19)  IMPRESSION: Postop changes again identified with abnormal low-attenuation   involving the posterior fossa region associated hemorrhage. 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    There are bilateral subdural collections again identified.  Dilated lateral ventricles are seen with intraventricular air and   hemorrhage.    CT Head No Cont (12.24.24 @ 06:01)   IMPRESSION: Stable follow-up CT exam.  No new areas of acute intracranial hemorrhage are noted.    CT Head No Cont (12.22.24 @ 06:42)  IMPRESSION: No change since 12/21/2024 at 10:47 PM. Left suboccipital   craniectomy with postoperative changes and residual hemorrhage in the   cerebellar vermis. Intraventricular air and hemorrhage. Mild ventricular dilatation.    CT Head No Cont (12.21.24 @ 22:48)  IMPRESSION:  1. Decompression of the hematoma in the cerebellum, without interval rebleeding.    CT Head No Cont (12.21.24 @ 18:20)  IMPRESSION: Midline posterior fossa cerebellar hemorrhage is   re-demonstrated measuring 4.2 x 3.4 x 4.0 cm, mildly larger in size.   Severe mass effect on the fourth ventricle. Mild dissection of hemorrhage   into the fourth ventricle. Mild hydrocephalus. In the intraventricular   hemorrhage in the bilateral occipital horns. Small anterior left frontal   convexity subdural hygroma measuring 2.2 mm in thickness. Left-to-right   midline shift is 3.6 mm. Mild subarachnoid hemorrhage in the left cerebellum.

## 2024-12-24 NOTE — DIETITIAN INITIAL EVALUATION ADULT - OTHER INFO
Pt is a 69 year old Male;  s/p suboccipital craniectomy and clot evacuation, remained extubated post procedure. Patient extubated by NSICU team on 12/22, patient.  Yesterday patient's exam remained MORA AG very strong, PERRL, patient unable to participate in the remainder of neuro exam which is unchanged from patient's exam post extubation on 1/23.  During day shift on 12/24 patient re-intubated for severe agitation and belly breathing, medications adjusted.  Repeat CTH done and reviewed by neurosurgery team.

## 2024-12-24 NOTE — PROGRESS NOTE ADULT - ASSESSMENT
69 yr old male with acute non-traumatic cerebellar ICH, with vasogenic edema, cerebellar compression;  obstructive hydrocephalus.  Now s/p decompressive SOC 12/21/2024.  Acute hypoxemic resp failure due to EtOH wdrl, neurologic injury. Re-intubated 12/23 .  Suspected KEN.  EtOH wdrl.  Fever, concern for aspiration PNA.  PMh of CAD/CABG x2, htn, hld , on afib eliquis with prior ischemic stroke 2018, frequent UTIs, kidney stones, turp, babesiasis sepsis 6/2024, etoh abuse.    Plan:  - neurochecks  - sedation with Propofol gtt - minimal requirements - switch to Precedex gtt  - cont  Librium PO - taper over 5 days + PRN IVp Ativan  - cont  VPA 25- q6hrs, check level and LFT in am  - pending MRIb w/contrast  - maintain Osats>92%, normocapnia; SBT as tolerated; wean to PEEP 6, fiO2 as tolerated  - maintain -160, avoid significant fluctuations; PRN meds  - HR control - aim for <110; restart Metoprolol  - hold full AC in view of ICH  - monitor e-lytes, I/Os, decrease IV fluids rate; maintain Na 140-150, BMP q12hrs; cont flomax  - cont TF, bowel regimen, add supp; PPI for ulcer ppx  -monitor FS, aim for 120-180, ISS  - started on empiric Zosyn cont, sepsis w/up in progress  -SCDs, switch to SQH for VTE ppx

## 2024-12-24 NOTE — DIETITIAN INITIAL EVALUATION ADULT - NSFNSGIIOFT_GEN_A_CORE
12-23-24 @ 07:01  -  12-24-24 @ 07:00  --------------------------------------------------------  OUT:  Total OUT: 0 mL    Total NET: 310 mL      12-24-24 @ 07:01  -  12-24-24 @ 09:57  --------------------------------------------------------  OUT:  Total OUT: 0 mL    Total NET: 120 mL

## 2024-12-24 NOTE — DIETITIAN INITIAL EVALUATION ADULT - ENTERAL
Change enteral regime per ICU Protocol; Jevity 1.5 @ 65ml/hr (x18 hours) 1170ml/day; 1755kcal, 75gm protein, 889ml free water

## 2024-12-24 NOTE — DIETITIAN INITIAL EVALUATION ADULT - NS FNS DIET ORDER
Diet, NPO with Tube Feed:   Tube Feeding Modality: Nasogastric  Jevity 1.5 Seun (JEVITY1.5RTH)  Total Volume for 24 Hours (mL): 1320  Continuous  Starting Tube Feed Rate {mL per Hour}: 10  Increase Tube Feed Rate by (mL): 10     Every 4 hours  Until Goal Tube Feed Rate (mL per Hour): 55  Tube Feed Duration (in Hours): 24  Tube Feed Start Time: 11:00 (12-23-24 @ 10:37)

## 2024-12-24 NOTE — DIETITIAN INITIAL EVALUATION ADULT - PERTINENT LABORATORY DATA
12-24    140  |  108  |  30.9[H]  ----------------------------<  173[H]  4.2   |  21.0[L]  |  1.52[H]    Ca    8.8      24 Dec 2024 02:05  Phos  2.1     12-24  Mg     2.5     12-24    POCT Blood Glucose.: 147 mg/dL (12-24-24 @ 06:32)  A1C with Estimated Average Glucose Result: 6.0 % (12-22-24 @ 02:27)  A1C with Estimated Average Glucose Result: 5.8 % (12-21-24 @ 16:25)

## 2024-12-25 NOTE — PROGRESS NOTE ADULT - SUBJECTIVE AND OBJECTIVE BOX
69 yr old male history of htn, hld , on afib eliquis with prior ischemic stroke 2018, frequent utis, kidney stones, turp, cabg x 2, 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  went to [place], ich, vomited gcs 13 intubated.  Transferred to [facility] for possible SOC.  On arrival patient is sedated on versed, propofol and fent. (21 Dec 2024 16:13)    24hr EVENTS: still with episodes of agitation overnight, required sedation IVp meds.    ROS: [x ]  Unable to assess due to mental status     -----------------------------------------------------------------------------------------------------------------------------------------------------------------------------------      ICU Vital Signs Last 24 Hrs  T(C): 38 (25 Dec 2024 15:45), Max: 38.3 (24 Dec 2024 17:15)  T(F): 100.4 (25 Dec 2024 15:45), Max: 100.9 (24 Dec 2024 17:15)  HR: 68 (25 Dec 2024 15:45) (52 - 98)  BP: 147/74 (25 Dec 2024 15:45) (104/48 - 218/104)  BP(mean): 96 (25 Dec 2024 15:45) (65 - 153)  ABP: --  ABP(mean): --  RR: 17 (25 Dec 2024 15:45) (11 - 26)  SpO2: 98% (25 Dec 2024 15:45) (94% - 100%)    O2 Parameters below as of 25 Dec 2024 15:00  Patient On (Oxygen Delivery Method): ventilator    O2 Concentration (%): 50        -----------------------------------------------------------------------------------------------------------------------------------------------------------------------------------    PHYSICAL EXAM:  General: Calm, intubated, NAD  Neuro:  -Mental status- not FC, no EO, corneals +, weak dolls, PERRL, cough present, o/b the vent;  moves BL UE spont and loc, BL LE spont.    CV: S1S2 present  Pulm: clear to auscultation BL  Abd: Soft, nontender, nondistended  Ext: no noted edema in lower ext  Skin: warm, dry

## 2024-12-25 NOTE — PROGRESS NOTE ADULT - ASSESSMENT
69 yr old male with acute non-traumatic cerebellar ICH, with vasogenic edema, cerebellar compression;  obstructive hydrocephalus.  S/p decompressive SOC 12/21/2024.  Acute hypoxemic resp failure due to EtOH wdrl, neurologic injury. Re-intubated 12/23 .  Suspected KEN.  EtOH wdrl.  Fever, concern for aspiration PNA.  PMh of CAD/CABG x2, htn, hld , on afib eliquis with prior ischemic stroke 2018, frequent UTIs, kidney stones, turp, babesiasis sepsis 6/2024, etoh abuse.    Plan:  - neurochecks  - MRI brain   - sedation with Precedex gtt + PRN Fentanyl IVp  - cont  Librium PO - keep as 50 q 6hrs for now + PRN IVp Ativan  - cont  VPA 25- q6hrs, check level and LFT 12/27  - maintain Osats>92%, normocapnia; SBT as tolerated  - maintain -160, avoid significant fluctuations; PRN meds  - HR control - aim for <110; cont Metoprolol  - hold full AC in view of fresh ICH  - monitor e-lytes, I/Os, IVf; maintain Na 140-150, BMP daily; cont flomax  - cont TF, bowel regimen; PPI for ulcer ppx  - monitor FS, aim for 120-180, ISS  - cont empiric Zosyn, sepsis w/up in progress  - SCDs, SQH for VTE ppx

## 2024-12-25 NOTE — PROGRESS NOTE ADULT - SUBJECTIVE AND OBJECTIVE BOX
HPI:   69 yr old male history of htn, hld , on afib eliquis with prior ischemic stroke 2018, frequent utis, kidney stones, turp, cabg x 2, 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  went to [place], ich, vomited gcs 13 intubated.  Transferred to [facility] for possible SOC.  On arrival patient is sedated on versed, propofol and fent.    MRS 0  ICH score 3  NIHSS 25 (21 Dec 2024 16:13)    OVERNIGHT EVENTS:  KANDACE overnight. Neuro exam stable.    Vital Signs Last 24 Hrs  T(C): 37.1 (25 Dec 2024 00:00), Max: 38.4 (24 Dec 2024 04:00)  T(F): 98.8 (25 Dec 2024 00:00), Max: 101.1 (24 Dec 2024 04:00)  HR: 60 (25 Dec 2024 00:00) (52 - 101)  BP: 128/66 (25 Dec 2024 00:00) (87/54 - 182/71)  BP(mean): 83 (25 Dec 2024 00:00) (60 - 127)  RR: 19 (25 Dec 2024 00:00) (14 - 27)  SpO2: 98% (25 Dec 2024 00:00) (94% - 100%)    Parameters below as of 25 Dec 2024 00:00  Patient On (Oxygen Delivery Method): ventilator        I&O's Summary    23 Dec 2024 07:01  -  24 Dec 2024 07:00  --------------------------------------------------------  IN: 2642.2 mL / OUT: 1465 mL / NET: 1177.2 mL    24 Dec 2024 07:01  -  25 Dec 2024 00:58  --------------------------------------------------------  IN: 2964.9 mL / OUT: 1375 mL / NET: 1589.9 mL        PHYSICAL EXAM:  General: NAD, pt is comfortably sitting up in bed, intubated  Cardiovascular: RRR, normal S1 and S2   Respiratory: lungs CTAB, no wheezing, rhonchi, or crackles   GI: normoactive BS to auscultation, abd soft  Neuro: Not OE, not FC  CNII-CXII: PERRL 3mm briskly reactive, unable to assess EOM, +cough/gag/corneals  Motor: Localizing UE b/l, moving LE spontaneously and bending knees b/l   Extremities: distal pulses 2+ x4 symmetric  Wound/incision: SOC incision site C/D/I    LABS:                        14.4   17.61 )-----------( 151      ( 24 Dec 2024 02:05 )             44.0     12-24    142  |  112[H]  |  43.5[H]  ----------------------------<  170[H]  4.4   |  20.0[L]  |  1.73[H]    Ca    8.5      24 Dec 2024 16:30  Phos  2.1     12-24  Mg     2.5     12-24        Urinalysis Basic - ( 24 Dec 2024 16:30 )    Color: x / Appearance: x / SG: x / pH: x  Gluc: 170 mg/dL / Ketone: x  / Bili: x / Urobili: x   Blood: x / Protein: x / Nitrite: x   Leuk Esterase: x / RBC: x / WBC x   Sq Epi: x / Non Sq Epi: x / Bacteria: x          CAPILLARY BLOOD GLUCOSE      POCT Blood Glucose.: 146 mg/dL (24 Dec 2024 21:41)  POCT Blood Glucose.: 142 mg/dL (24 Dec 2024 17:18)  POCT Blood Glucose.: 190 mg/dL (24 Dec 2024 11:21)  POCT Blood Glucose.: 147 mg/dL (24 Dec 2024 06:32)      Drug Levels: [] N/A    CSF Analysis: [] N/A      Allergies    rocuronium (Rash (Severe))    Intolerances      MEDICATIONS:  Antibiotics:  piperacillin/tazobactam IVPB.. 3.375 Gram(s) IV Intermittent every 8 hours    Neuro:  chlordiazePOXIDE 50 milliGRAM(s) Oral every 8 hours  chlordiazePOXIDE   Oral   dexMEDEtomidine Infusion 0.2 MICROgram(s)/kG/Hr IV Continuous <Continuous>  fentaNYL    Injectable 25 MICROGram(s) IV Push every 2 hours PRN  LORazepam   Injectable 1 milliGRAM(s) IV Push every 2 hours PRN  ondansetron Injectable 4 milliGRAM(s) IV Push every 6 hours PRN  propofol Infusion 10 MICROgram(s)/kG/Min IV Continuous <Continuous>  valproate sodium   IVPB 250 milliGRAM(s) IV Intermittent every 6 hours    Anticoagulation:  heparin   Injectable 5000 Unit(s) SubCutaneous every 8 hours    OTHER:  atorvastatin 40 milliGRAM(s) Oral at bedtime  chlorhexidine 0.12% Liquid 15 milliLiter(s) Oral Mucosa every 12 hours  chlorhexidine 2% Cloths 1 Application(s) Topical daily  dextrose 50% Injectable 25 Gram(s) IV Push once  dextrose 50% Injectable 12.5 Gram(s) IV Push once  dextrose 50% Injectable 25 Gram(s) IV Push once  doxazosin 4 milliGRAM(s) Oral at bedtime  hydrALAZINE Injectable 10 milliGRAM(s) IV Push every 2 hours PRN  influenza  Vaccine (HIGH DOSE) 0.5 milliLiter(s) IntraMuscular once  insulin lispro (ADMELOG) corrective regimen sliding scale   SubCutaneous every 6 hours  labetalol Injectable 10 milliGRAM(s) IV Push every 2 hours PRN  metoprolol tartrate 25 milliGRAM(s) Oral every 12 hours  pantoprazole  Injectable 40 milliGRAM(s) IV Push daily  polyethylene glycol 3350 17 Gram(s) Oral two times a day  senna 2 Tablet(s) Oral at bedtime    IVF:  dextrose 5%. 1000 milliLiter(s) IV Continuous <Continuous>  dextrose 5%. 1000 milliLiter(s) IV Continuous <Continuous>  folic acid Injectable 1 milliGRAM(s) IV Push daily  sodium chloride 0.9%. 1000 milliLiter(s) IV Continuous <Continuous>  thiamine IVPB 500 milliGRAM(s) IV Intermittent every 8 hours    CULTURES:  Culture Results:   No growth (12-21 @ 23:05)  Culture Results:   No growth at 48 Hours (12-21 @ 17:32)    RADIOLOGY & ADDITIONAL TESTS:      ASSESSMENT:  69M PMH HTN, HLD, A fib on Eliquis, Ischemic stroke 2018, TURP, CAD, CABG x2, kidney stones, ETOH, [place] [facility] [place], found to have cerebellar ICH. POD 4 s/p suboccipital craniectomy with clot evacuation 12/21/24    PLAN  - Q1 neuro checks  - stat CTH for any changes in neuro exam  - MRI brain w/wo pending  - HOB >30 degrees  - SBP goal 100-160  - full ventilatory support  - Na goal 140-145  - alvares- plan to remove and TOV  - zosyn for aspiration PNA  - DVT ppx: SCDs, SQ heparin  Final plan to be discussed in AM rounds

## 2024-12-26 NOTE — PROGRESS NOTE ADULT - ASSESSMENT
COMPLETE A/P PENDING   ASSESSMENT:   The pt is a 69y M PMHx ischemic stroke (2018), atrial fibrillation on Eliquis, ETOH abuse, HTN, HLD, frequent UTI, hx kidney stones, s/p TURP, s/p CABG x2, and babesiasis sepsis in 06/2024 [place] [facility] [place] complaining of dizziness while driving with associated nausea and confusion. [place] [facility] [place] GCS [place] noted to be 13. (+) vomiting. CT imaging revealed large cerebellar hemorrhage w/ severe mass effect and mild dissection into the fourth ventricle; associated hydrocephalus, 3.6mm left-to-right midline shift, b/l occipital horn intraventricular hemorrhage, 2.2mm left frontal convexity subdural hygroma and mild SAH in the Left cerebellum. Transferred to [facility] for suboccipital craniectomy.    Impression: Acute non-traumatic cerebellar ICH with vasogenic edema, cerebellar compression and obstructive hydrocephalus, s/p Left decompressive SOC 12/21/24. MRI w/ and w/o completed showing no   evidence of enhancing lesion although evaluation of the right cerebellar hematoma site. Etiology likely hypertensive with superimposed ETOH abuse vs underlying coagulopathy.     NEURO:   -Neurologically ***  -Continue close monitoring for neurologic deterioration    -Coma neuro checks q 1h  -SBP goal <160, avoiding rapid fluctuations and hypotension.   -ANTITHROMBOTIC THERAPY: currently on hold in setting of ICH.   -Hx AF: Eliquis on hold in setting of ICH, reinitiation timeline per NS however from a stroke neuro standpoint would hold for at least 1 month.   -statin not indicated in the setting of ICH  -MRI Brain w/o results as above.   -Dysphagia screen: fail, NGT in place  -Physical therapy/OT/Speech eval/treatment.   -TTE as noted   -Cardiac monitoring w/ telemetry           -DVT ppx: Heparin s.c [] LMWH [X] SCD []    -Maintain adequate hydration    -Na Goal: 135-145   -Monitor for si/sx of infection   -LDL/A1C as above  -Stroke education     OTHER: Condition and plan of care d/w patient, questions and concerns addressed.     DISPOSITION: Rehab or home depending on PT eval once stable and workup is complete      CORE MEASURES:        Admission NIHSS: 27     Tenecteplase : [] YES [X] NO      LDL/A1C: 88 / 6.0     Depression Screen - if depression hx and/or present      Statin Therapy:      Dysphagia Screen: [] PASS [X] FAIL -- NGT     Smoking [] YES [X] NO      Afib [X] YES [] NO     Stroke Education [] YES [] NO [x] PENDING         ASSESSMENT:   The pt is a 69y M PMHx ischemic stroke (2018), atrial fibrillation on Eliquis, ETOH abuse, HTN, HLD, frequent UTI, hx kidney stones, s/p TURP, s/p CABG x2, and babesiasis sepsis in 06/2024 [place] [facility] [place] complaining of dizziness while driving with associated nausea and confusion. [place] [facility] [place] GCS was noted to be 13. (+) vomiting. CT imaging revealed large cerebellar hemorrhage w/ severe mass effect and mild dissection into the fourth ventricle; associated hydrocephalus, 3.6mm left-to-right midline shift, b/l occipital horn intraventricular hemorrhage, 2.2mm left frontal convexity subdural hygroma and mild SAH in the Left cerebellum. Transferred to [facility] for suboccipital craniectomy.    Impression: Acute non-traumatic cerebellar ICH with vasogenic edema, cerebellar compression and obstructive hydrocephalus, s/p Left decompressive SOC 12/21/24. MRI w/ and w/o completed showing no   evidence of enhancing lesion although evaluation of the right cerebellar hematoma site. Etiology likely hypertensive with superimposed ETOH abuse vs underlying coagulopathy.     NEURO:   -Neurologically w/ limited exam secondary to intubation/sedation   -Continue close monitoring for neurologic deterioration    -Coma neuro checks q 1h  -SBP goal <160, avoiding rapid fluctuations and hypotension.   -ANTITHROMBOTIC THERAPY: currently on hold in setting of ICH.   -Hx AF: Eliquis on hold in setting of ICH, reinitiation timeline per NS however from a stroke neuro standpoint would hold for at least 1 month.   -statin not indicated in the setting of ICH  -MRI Brain w/o results as above.   -Dysphagia screen: fail, NGT in place  -Physical therapy/OT/Speech eval/treatment.   -TTE as noted   -Cardiac monitoring w/ telemetry           -DVT ppx: Heparin s.c [] LMWH [X] SCD []    -Maintain adequate hydration    -Na Goal: 135-145   -Monitor for si/sx of infection   -LDL/A1C as above  -Stroke education     OTHER: Condition and plan of care d/w patient, questions and concerns addressed.     DISPOSITION: Rehab or home depending on PT eval once stable and workup is complete      CORE MEASURES:        Admission NIHSS: 27     Tenecteplase : [] YES [X] NO      LDL/A1C: 88 / 6.0     Depression Screen - if depression hx and/or present      Statin Therapy:      Dysphagia Screen: [] PASS [X] FAIL -- NGT     Smoking [] YES [X] NO      Afib [X] YES [] NO     Stroke Education [] YES [] NO [x] PENDING

## 2024-12-26 NOTE — PROGRESS NOTE ADULT - SUBJECTIVE AND OBJECTIVE BOX
Preliminary note, offical recommendations pending attending review/signature   [facility] Stroke Team  Progress Note     HPI:   69 yr old male history of htn, hld , on afib eliquis with prior ischemic stroke 2018, frequent utis, kidney stones, turp, cabg x 2, 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  went to [place], ich, vomited gcs 13 intubated.  Transferred to [facility] for possible SOC.  On arrival patient is sedated on versed, propofol and fent.    MRS 0  ICH score 3  NIHSS 25 (21 Dec 2024 16:13)      Admission NIHSS  Pre-MRS  ICH Score (if applicable)    SUBJECTIVE: No events overnight.  No new neurologic complaints.  ROS reported negative unless otherwise noted.    amLODIPine   Tablet 5 milliGRAM(s) Oral daily  atorvastatin 40 milliGRAM(s) Oral at bedtime  chlordiazePOXIDE 50 milliGRAM(s) Oral every 6 hours  chlorhexidine 0.12% Liquid 15 milliLiter(s) Oral Mucosa every 12 hours  chlorhexidine 2% Cloths 1 Application(s) Topical daily  dexMEDEtomidine Infusion 0.2 MICROgram(s)/kG/Hr IV Continuous <Continuous>  dextrose 5%. 1000 milliLiter(s) IV Continuous <Continuous>  dextrose 5%. 1000 milliLiter(s) IV Continuous <Continuous>  dextrose 50% Injectable 25 Gram(s) IV Push once  dextrose 50% Injectable 12.5 Gram(s) IV Push once  dextrose 50% Injectable 25 Gram(s) IV Push once  doxazosin 4 milliGRAM(s) Oral at bedtime  enoxaparin Injectable 40 milliGRAM(s) SubCutaneous <User Schedule>  fentaNYL    Injectable 50 MICROGram(s) IV Push every 2 hours PRN  folic acid Injectable 1 milliGRAM(s) IV Push daily  hydrALAZINE Injectable 10 milliGRAM(s) IV Push every 2 hours PRN  insulin lispro (ADMELOG) corrective regimen sliding scale   SubCutaneous every 6 hours  labetalol Injectable 10 milliGRAM(s) IV Push every 2 hours PRN  LORazepam   Injectable 1 milliGRAM(s) IV Push every 2 hours PRN  metoprolol tartrate 25 milliGRAM(s) Oral every 12 hours  ondansetron Injectable 4 milliGRAM(s) IV Push every 6 hours PRN  pantoprazole  Injectable 40 milliGRAM(s) IV Push daily  piperacillin/tazobactam IVPB.. 3.375 Gram(s) IV Intermittent every 8 hours  polyethylene glycol 3350 17 Gram(s) Oral two times a day  senna 2 Tablet(s) Oral at bedtime  valproate sodium   IVPB 250 milliGRAM(s) IV Intermittent every 6 hours      PHYSICAL EXAM:   Vital Signs Last 24 Hrs  T(C): 37.3 (26 Dec 2024 10:30), Max: 38.1 (25 Dec 2024 18:00)  T(F): 99.1 (26 Dec 2024 10:30), Max: 100.6 (25 Dec 2024 18:00)  HR: 64 (26 Dec 2024 10:30) (56 - 98)  BP: 159/67 (26 Dec 2024 10:30) (110/74 - 218/104)  BP(mean): 94 (26 Dec 2024 10:30) (75 - 135)  RR: 18 (26 Dec 2024 10:30) (13 - 26)  SpO2: 98% (26 Dec 2024 10:30) (93% - 100%)    Parameters below as of 26 Dec 2024 10:00  Patient On (Oxygen Delivery Method): ventilator    O2 Concentration (%): 50    COMPLETE PE PENDING     General: No acute distress.   HEENT: Head normocephalic, atraumatic. Conjunctivae clear w/o exudates or hemorrhage. Sclera non-icteric. Nares are patent bilaterally. Mucous membranes pink and moist.  No tonsillar swelling or exudates.    Neck: Supple, no adenopathy. Trachea midline. No JVD.  Cardiac: Normal rate and rhythm. S1, S2 auscultated. No murmurs, gallops, or rubs.     Respiratory: Lung sounds clear in all fields. Chest wall symmetric, nontender.   Abdominal: Soft, nondistended, nontender. Bowel sounds normoactive x 4 quadrants. No masses, hepatomegaly, or splenomegaly.   Skin: Skin is warm, dry and intact without rashes or lesions. Appropriate color for ethnicity. Nailbeds pink with no cyanosis or clubbing.   Extremities: No edema, 2+ peripheral pulses in b/l upper and b/l lower extremities. Full range of motion in all joints.     NEUROLOGICAL EXAM:  Mental status: Awake, alert, oriented x3, speech fluent, follows commands, no neglect, normal memory   Cranial Nerves: No facial asymmetry, no nystagmus, no dysarthria,  tongue midline  Motor exam: Normal tone, no drift, 5/5 RUE, 5/5 RLE, 5/5 LUE, 5/5 LLE, normal fine finger movements.  Sensation: Intact to light touch   Coordination/ Gait: No dysmetria, gait not tested    LABS:                        13.8   11.13 )-----------( 133      ( 26 Dec 2024 03:20 )             44.3    12-26    146[H]  |  113[H]  |  28.0[H]  ----------------------------<  173[H]  4.4   |  21.0[L]  |  0.95    Ca    8.4      26 Dec 2024 03:20  Phos  2.5     12-26  Mg     2.6     12-26    TPro  5.7[L]  /  Alb  2.8[L]  /  TBili  0.3[L]  /  DBili  0.1  /  AST  18  /  ALT  17  /  AlkPhos  50  12-25 12-21 Chol 159 LDL -- HDL 37[L] Trig 168[H]    A1C:    IMAGING: Reviewed by me.      Preliminary note, offical recommendations pending attending review/signature   [facility] Stroke Team  Progress Note     HPI:  The pt is a 69y M PMHx ischemic stroke (2018), atrial fibrillation on Eliquis, ETOH abuse, HTN, HLD, frequent UTI, hx kidney stones, s/p TURP, s/p CABG x2, and babesiasis sepsis in 06/2024 who initially presented to [facility] on 12/21/24 complaining of dizziness while driving with associated nausea and confusion. While at [facility], GCS was noted to be 13. (+) vomiting. CT imaging revealed large cerebellar hemorrhage w/ severe mass effect and mild dissection into the fourth ventricle; associated hydrocephalus, 3.6mm left-to-right midline shift, b/l occipital horn intraventricular hemorrhage and mild SAH in the Left cerebellum. Also notable for 2.2mm left frontal convexity subdural hygroma. Patient was subsequently intubated and transferred to [facility] for possible suboccipital craniectomy. Upon arrival patient was sedated on Versed, Propofol and Fentanyl; admitted to NSICU. Now s/p decompressive SOC 12/21/2024    SUBJECTIVE: No events overnight. Patient remains intubated. Unable to obtain complete ROS secondary to intubated status.     amLODIPine   Tablet 5 milliGRAM(s) Oral daily  atorvastatin 40 milliGRAM(s) Oral at bedtime  chlordiazePOXIDE 50 milliGRAM(s) Oral every 6 hours  chlorhexidine 0.12% Liquid 15 milliLiter(s) Oral Mucosa every 12 hours  chlorhexidine 2% Cloths 1 Application(s) Topical daily  dexMEDEtomidine Infusion 0.2 MICROgram(s)/kG/Hr IV Continuous <Continuous>  dextrose 5%. 1000 milliLiter(s) IV Continuous <Continuous>  dextrose 5%. 1000 milliLiter(s) IV Continuous <Continuous>  dextrose 50% Injectable 25 Gram(s) IV Push once  dextrose 50% Injectable 12.5 Gram(s) IV Push once  dextrose 50% Injectable 25 Gram(s) IV Push once  doxazosin 4 milliGRAM(s) Oral at bedtime  enoxaparin Injectable 40 milliGRAM(s) SubCutaneous <User Schedule>  fentaNYL    Injectable 50 MICROGram(s) IV Push every 2 hours PRN  folic acid Injectable 1 milliGRAM(s) IV Push daily  hydrALAZINE Injectable 10 milliGRAM(s) IV Push every 2 hours PRN  insulin lispro (ADMELOG) corrective regimen sliding scale   SubCutaneous every 6 hours  labetalol Injectable 10 milliGRAM(s) IV Push every 2 hours PRN  LORazepam   Injectable 1 milliGRAM(s) IV Push every 2 hours PRN  metoprolol tartrate 25 milliGRAM(s) Oral every 12 hours  ondansetron Injectable 4 milliGRAM(s) IV Push every 6 hours PRN  pantoprazole  Injectable 40 milliGRAM(s) IV Push daily  piperacillin/tazobactam IVPB.. 3.375 Gram(s) IV Intermittent every 8 hours  polyethylene glycol 3350 17 Gram(s) Oral two times a day  senna 2 Tablet(s) Oral at bedtime  valproate sodium   IVPB 250 milliGRAM(s) IV Intermittent every 6 hours      PHYSICAL EXAM:   Vital Signs Last 24 Hrs  T(C): 37.3 (26 Dec 2024 10:30), Max: 38.1 (25 Dec 2024 18:00)  T(F): 99.1 (26 Dec 2024 10:30), Max: 100.6 (25 Dec 2024 18:00)  HR: 64 (26 Dec 2024 10:30) (56 - 98)  BP: 159/67 (26 Dec 2024 10:30) (110/74 - 218/104)  BP(mean): 94 (26 Dec 2024 10:30) (75 - 135)  RR: 18 (26 Dec 2024 10:30) (13 - 26)  SpO2: 98% (26 Dec 2024 10:30) (93% - 100%)    Parameters below as of 26 Dec 2024 10:00  Patient On (Oxygen Delivery Method): ventilator    O2 Concentration (%): 50    COMPLETE PE PENDING     General: No acute distress    NEUROLOGICAL EXAM:    Mental status: Intubated and sedated, propofol paused. Gag and cough intact. Patient not following commands. No verbal output.     Cranial nerves: Pupils equal and react symmetrically to light, sluggish. Dysconjugate gaze. (+) corneal reflexes b/l. +Weak occulocephalics b/l.     Motor: There is no tremor.  3/5 LUE  3/5 RUE  2/5 LLE  2/5 RLE     Sensation: Withdraws in all extremities to painful stimuli except RUE    Cerebellar: deferred    Gait : deferred    LABS:                        13.8   11.13 )-----------( 133      ( 26 Dec 2024 03:20 )             44.3    12-26    146[H]  |  113[H]  |  28.0[H]  ----------------------------<  173[H]  4.4   |  21.0[L]  |  0.95    Ca    8.4      26 Dec 2024 03:20  Phos  2.5     12-26  Mg     2.6     12-26    TPro  5.7[L]  /  Alb  2.8[L]  /  TBili  0.3[L]  /  DBili  0.1  /  AST  18  /  ALT  17  /  AlkPhos  50  12-25      LDL 88  A1C 6.0    IMAGING:    MR Head w/wo IV Cont (12.25.24 @ 12:26)  IMPRESSION:  Status post suboccipital craniectomy for partial evacuation of right   cerebellar hematoma with associated postsurgical changes as detailed   above. Small residual hematoma the right cerebellar hemisphere. No   evidence of enhancing lesion although evaluation of the right cerebellar   hematoma site is partially degraded by intrinsic T1 hyperintense signal   from early subacute hemorrhage.    CT Head No Cont (12.24.24 @ 06:01)   IMPRESSION: Stable follow-up CT exam.  No new areas of acute intracranial hemorrhage are noted.      CT Head No Cont (12.23.24 @ 09:19)  IMPRESSION: Postop changes again identified with abnormal low-attenuation   involving the posterior fossa region associated hemorrhage. 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    There are bilateral subdural collections again identified.  Dilated lateral ventricles are seen with intraventricular air and   hemorrhage.    CT Head No Cont (12.24.24 @ 06:01)   IMPRESSION: Stable follow-up CT exam.  No new areas of acute intracranial hemorrhage are noted.    CT Head No Cont (12.22.24 @ 06:42)  IMPRESSION: No change since 12/21/2024 at 10:47 PM. Left suboccipital   craniectomy with postoperative changes and residual hemorrhage in the   cerebellar vermis. Intraventricular air and hemorrhage. Mild ventricular dilatation.    CT Head No Cont (12.21.24 @ 22:48)  IMPRESSION:  1. Decompression of the hematoma in the cerebellum, without interval rebleeding.    CT Head No Cont (12.21.24 @ 18:20)  IMPRESSION: Midline posterior fossa cerebellar hemorrhage is   re-demonstrated measuring 4.2 x 3.4 x 4.0 cm, mildly larger in size.   Severe mass effect on the fourth ventricle. Mild dissection of hemorrhage   into the fourth ventricle. Mild hydrocephalus. In the intraventricular   hemorrhage in the bilateral occipital horns. Small anterior left frontal   convexity subdural hygroma measuring 2.2 mm in thickness. Left-to-right   midline shift is 3.6 mm. Mild subarachnoid hemorrhage in the left cerebellum.  --  CARDIAC STUDIES:  TTE W or WO Ultrasound Enhancing Agent (12.22.24 @ 09:19)  CONCLUSIONS:   1. Left ventricularcavity is mildly dilated. Left ventricular systolic function is normal with an ejection fraction of 53 % by Ac's method of disks with an ejection fraction visually estimated at 50 to 55 %.   2. Moderate left ventricular hypertrophy.   3. There is mild (grade 1) left ventricular diastolic dysfunction, with elevated left ventricular filling pressure.   4. Normal right ventricular cavity size and probably normal right ventricular systolic function.   5. Mild to moderate aortic stenosis.   6. Trace aortic regurgitation.   7. Aortic root at the sinuses of Valsalva is dilated, measuring 4.20 cm (indexed 1.92 cm/m²). Ascending aorta is dilated, measuring 4.00 cm (indexed 1.83 cm/m²).     Preliminary note, offical recommendations pending attending review/signature   [facility] Stroke Team  Progress Note     HPI:  The pt is a 69y M PMHx ischemic stroke (2018), atrial fibrillation on Eliquis, ETOH abuse, HTN, HLD, frequent UTI, hx kidney stones, s/p TURP, s/p CABG x2, and babesiasis sepsis in 06/2024 who initially presented to [facility] on 12/21/24 complaining of dizziness while driving with associated nausea and confusion. While at [facility], GCS was noted to be 13. (+) vomiting. CT imaging revealed large cerebellar hemorrhage w/ severe mass effect and mild dissection into the fourth ventricle; associated hydrocephalus, 3.6mm left-to-right midline shift, b/l occipital horn intraventricular hemorrhage and mild SAH in the Left cerebellum. Also notable for 2.2mm left frontal convexity subdural hygroma. Patient was subsequently intubated and transferred to [facility] for possible suboccipital craniectomy. Upon arrival patient was sedated on Versed, Propofol and Fentanyl; admitted to NSICU. Now s/p decompressive SOC 12/21/2024    SUBJECTIVE: No events overnight. Patient remains intubated. Unable to obtain complete ROS secondary to intubated status.     amLODIPine   Tablet 5 milliGRAM(s) Oral daily  atorvastatin 40 milliGRAM(s) Oral at bedtime  chlordiazePOXIDE 50 milliGRAM(s) Oral every 6 hours  chlorhexidine 0.12% Liquid 15 milliLiter(s) Oral Mucosa every 12 hours  chlorhexidine 2% Cloths 1 Application(s) Topical daily  dexMEDEtomidine Infusion 0.2 MICROgram(s)/kG/Hr IV Continuous <Continuous>  dextrose 5%. 1000 milliLiter(s) IV Continuous <Continuous>  dextrose 5%. 1000 milliLiter(s) IV Continuous <Continuous>  dextrose 50% Injectable 25 Gram(s) IV Push once  dextrose 50% Injectable 12.5 Gram(s) IV Push once  dextrose 50% Injectable 25 Gram(s) IV Push once  doxazosin 4 milliGRAM(s) Oral at bedtime  enoxaparin Injectable 40 milliGRAM(s) SubCutaneous <User Schedule>  fentaNYL    Injectable 50 MICROGram(s) IV Push every 2 hours PRN  folic acid Injectable 1 milliGRAM(s) IV Push daily  hydrALAZINE Injectable 10 milliGRAM(s) IV Push every 2 hours PRN  insulin lispro (ADMELOG) corrective regimen sliding scale   SubCutaneous every 6 hours  labetalol Injectable 10 milliGRAM(s) IV Push every 2 hours PRN  LORazepam   Injectable 1 milliGRAM(s) IV Push every 2 hours PRN  metoprolol tartrate 25 milliGRAM(s) Oral every 12 hours  ondansetron Injectable 4 milliGRAM(s) IV Push every 6 hours PRN  pantoprazole  Injectable 40 milliGRAM(s) IV Push daily  piperacillin/tazobactam IVPB.. 3.375 Gram(s) IV Intermittent every 8 hours  polyethylene glycol 3350 17 Gram(s) Oral two times a day  senna 2 Tablet(s) Oral at bedtime  valproate sodium   IVPB 250 milliGRAM(s) IV Intermittent every 6 hours      PHYSICAL EXAM:   Vital Signs Last 24 Hrs  T(C): 37.3 (26 Dec 2024 10:30), Max: 38.1 (25 Dec 2024 18:00)  T(F): 99.1 (26 Dec 2024 10:30), Max: 100.6 (25 Dec 2024 18:00)  HR: 64 (26 Dec 2024 10:30) (56 - 98)  BP: 159/67 (26 Dec 2024 10:30) (110/74 - 218/104)  BP(mean): 94 (26 Dec 2024 10:30) (75 - 135)  RR: 18 (26 Dec 2024 10:30) (13 - 26)  SpO2: 98% (26 Dec 2024 10:30) (93% - 100%)    Parameters below as of 26 Dec 2024 10:00  Patient On (Oxygen Delivery Method): ventilator    O2 Concentration (%): 50    Limited exam secondary to intubated/sedated status     General: No acute distress    NEUROLOGICAL EXAM:    Mental status: Intubated and sedated. Gag and cough intact. Patient not following commands. No verbal output.     Cranial nerves: Pupils equal and react symmetrically to light, sluggish. Dysconjugate gaze. (+) corneal reflexes b/l. +Weak occulocephalics b/l.     Motor: There is no tremor.  3/5 LUE  3/5 RUE  2/5 LLE  2/5 RLE     Sensation: Withdraws in all extremities to painful stimuli except RUE    Cerebellar: deferred    Gait : deferred    LABS:                        13.8   11.13 )-----------( 133      ( 26 Dec 2024 03:20 )             44.3    12-26    146[H]  |  113[H]  |  28.0[H]  ----------------------------<  173[H]  4.4   |  21.0[L]  |  0.95    Ca    8.4      26 Dec 2024 03:20  Phos  2.5     12-26  Mg     2.6     12-26    TPro  5.7[L]  /  Alb  2.8[L]  /  TBili  0.3[L]  /  DBili  0.1  /  AST  18  /  ALT  17  /  AlkPhos  50  12-25      LDL 88  A1C 6.0    IMAGING:    MR Head w/wo IV Cont (12.25.24 @ 12:26)  IMPRESSION:  Status post suboccipital craniectomy for partial evacuation of right   cerebellar hematoma with associated postsurgical changes as detailed   above. Small residual hematoma the right cerebellar hemisphere. No   evidence of enhancing lesion although evaluation of the right cerebellar   hematoma site is partially degraded by intrinsic T1 hyperintense signal   from early subacute hemorrhage.    CT Head No Cont (12.24.24 @ 06:01)   IMPRESSION: Stable follow-up CT exam.  No new areas of acute intracranial hemorrhage are noted.      CT Head No Cont (12.23.24 @ 09:19)  IMPRESSION: Postop changes again identified with abnormal low-attenuation   involving the posterior fossa region associated hemorrhage. 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    There are bilateral subdural collections again identified.  Dilated lateral ventricles are seen with intraventricular air and   hemorrhage.    CT Head No Cont (12.24.24 @ 06:01)   IMPRESSION: Stable follow-up CT exam.  No new areas of acute intracranial hemorrhage are noted.    CT Head No Cont (12.22.24 @ 06:42)  IMPRESSION: No change since 12/21/2024 at 10:47 PM. Left suboccipital   craniectomy with postoperative changes and residual hemorrhage in the   cerebellar vermis. Intraventricular air and hemorrhage. Mild ventricular dilatation.    CT Head No Cont (12.21.24 @ 22:48)  IMPRESSION:  1. Decompression of the hematoma in the cerebellum, without interval rebleeding.    CT Head No Cont (12.21.24 @ 18:20)  IMPRESSION: Midline posterior fossa cerebellar hemorrhage is   re-demonstrated measuring 4.2 x 3.4 x 4.0 cm, mildly larger in size.   Severe mass effect on the fourth ventricle. Mild dissection of hemorrhage   into the fourth ventricle. Mild hydrocephalus. In the intraventricular   hemorrhage in the bilateral occipital horns. Small anterior left frontal   convexity subdural hygroma measuring 2.2 mm in thickness. Left-to-right   midline shift is 3.6 mm. Mild subarachnoid hemorrhage in the left cerebellum.  --  CARDIAC STUDIES:  TTE W or WO Ultrasound Enhancing Agent (12.22.24 @ 09:19)  CONCLUSIONS:   1. Left ventricularcavity is mildly dilated. Left ventricular systolic function is normal with an ejection fraction of 53 % by Ac's method of disks with an ejection fraction visually estimated at 50 to 55 %.   2. Moderate left ventricular hypertrophy.   3. There is mild (grade 1) left ventricular diastolic dysfunction, with elevated left ventricular filling pressure.   4. Normal right ventricular cavity size and probably normal right ventricular systolic function.   5. Mild to moderate aortic stenosis.   6. Trace aortic regurgitation.   7. Aortic root at the sinuses of Valsalva is dilated, measuring 4.20 cm (indexed 1.92 cm/m²). Ascending aorta is dilated, measuring 4.00 cm (indexed 1.83 cm/m²).       [facility] Stroke Team  Progress Note     HPI:  The pt is a 69y M PMHx ischemic stroke (2018), atrial fibrillation on Eliquis, ETOH abuse, HTN, HLD, frequent UTI, hx kidney stones, s/p TURP, s/p CABG x2, and babesiasis sepsis in 06/2024 who initially presented to [facility] on 12/21/24 complaining of dizziness while driving with associated nausea and confusion. While at [facility], GCS was noted to be 13. (+) vomiting. CT imaging revealed large cerebellar hemorrhage w/ severe mass effect and mild dissection into the fourth ventricle; associated hydrocephalus, 3.6mm left-to-right midline shift, b/l occipital horn intraventricular hemorrhage and mild SAH in the Left cerebellum. Also notable for 2.2mm left frontal convexity subdural hygroma. Patient was subsequently intubated and transferred to [facility] for possible suboccipital craniectomy. Upon arrival patient was sedated on Versed, Propofol and Fentanyl; admitted to NSICU. Now s/p decompressive SOC 12/21/2024    SUBJECTIVE: No events overnight. Patient remains intubated. Unable to obtain complete ROS secondary to intubated status.     amLODIPine   Tablet 5 milliGRAM(s) Oral daily  atorvastatin 40 milliGRAM(s) Oral at bedtime  chlordiazePOXIDE 50 milliGRAM(s) Oral every 6 hours  chlorhexidine 0.12% Liquid 15 milliLiter(s) Oral Mucosa every 12 hours  chlorhexidine 2% Cloths 1 Application(s) Topical daily  dexMEDEtomidine Infusion 0.2 MICROgram(s)/kG/Hr IV Continuous <Continuous>  dextrose 5%. 1000 milliLiter(s) IV Continuous <Continuous>  dextrose 5%. 1000 milliLiter(s) IV Continuous <Continuous>  dextrose 50% Injectable 25 Gram(s) IV Push once  dextrose 50% Injectable 12.5 Gram(s) IV Push once  dextrose 50% Injectable 25 Gram(s) IV Push once  doxazosin 4 milliGRAM(s) Oral at bedtime  enoxaparin Injectable 40 milliGRAM(s) SubCutaneous <User Schedule>  fentaNYL    Injectable 50 MICROGram(s) IV Push every 2 hours PRN  folic acid Injectable 1 milliGRAM(s) IV Push daily  hydrALAZINE Injectable 10 milliGRAM(s) IV Push every 2 hours PRN  insulin lispro (ADMELOG) corrective regimen sliding scale   SubCutaneous every 6 hours  labetalol Injectable 10 milliGRAM(s) IV Push every 2 hours PRN  LORazepam   Injectable 1 milliGRAM(s) IV Push every 2 hours PRN  metoprolol tartrate 25 milliGRAM(s) Oral every 12 hours  ondansetron Injectable 4 milliGRAM(s) IV Push every 6 hours PRN  pantoprazole  Injectable 40 milliGRAM(s) IV Push daily  piperacillin/tazobactam IVPB.. 3.375 Gram(s) IV Intermittent every 8 hours  polyethylene glycol 3350 17 Gram(s) Oral two times a day  senna 2 Tablet(s) Oral at bedtime  valproate sodium   IVPB 250 milliGRAM(s) IV Intermittent every 6 hours      PHYSICAL EXAM:   Vital Signs Last 24 Hrs  T(C): 37.3 (26 Dec 2024 10:30), Max: 38.1 (25 Dec 2024 18:00)  T(F): 99.1 (26 Dec 2024 10:30), Max: 100.6 (25 Dec 2024 18:00)  HR: 64 (26 Dec 2024 10:30) (56 - 98)  BP: 159/67 (26 Dec 2024 10:30) (110/74 - 218/104)  BP(mean): 94 (26 Dec 2024 10:30) (75 - 135)  RR: 18 (26 Dec 2024 10:30) (13 - 26)  SpO2: 98% (26 Dec 2024 10:30) (93% - 100%)    Parameters below as of 26 Dec 2024 10:00  Patient On (Oxygen Delivery Method): ventilator    O2 Concentration (%): 50    Limited exam secondary to intubated/sedated status     General: No acute distress    NEUROLOGICAL EXAM:    Mental status: Intubated and sedated. Gag and cough intact. Patient not following commands. No verbal output.     Cranial nerves: Pupils equal and react symmetrically to light, sluggish. Dysconjugate gaze. (+) corneal reflexes b/l. +Weak occulocephalics b/l.     Motor: There is no tremor.  3/5 LUE  3/5 RUE  2/5 LLE  2/5 RLE     Sensation: Withdraws in all extremities to painful stimuli except RUE    Cerebellar: deferred    Gait : deferred    LABS:                        13.8   11.13 )-----------( 133      ( 26 Dec 2024 03:20 )             44.3    12-26    146[H]  |  113[H]  |  28.0[H]  ----------------------------<  173[H]  4.4   |  21.0[L]  |  0.95    Ca    8.4      26 Dec 2024 03:20  Phos  2.5     12-26  Mg     2.6     12-26    TPro  5.7[L]  /  Alb  2.8[L]  /  TBili  0.3[L]  /  DBili  0.1  /  AST  18  /  ALT  17  /  AlkPhos  50  12-25      LDL 88  A1C 6.0    IMAGING:    MR Head w/wo IV Cont (12.25.24 @ 12:26)  IMPRESSION:  Status post suboccipital craniectomy for partial evacuation of right   cerebellar hematoma with associated postsurgical changes as detailed   above. Small residual hematoma the right cerebellar hemisphere. No   evidence of enhancing lesion although evaluation of the right cerebellar   hematoma site is partially degraded by intrinsic T1 hyperintense signal   from early subacute hemorrhage.    CT Head No Cont (12.24.24 @ 06:01)   IMPRESSION: Stable follow-up CT exam.  No new areas of acute intracranial hemorrhage are noted.      CT Head No Cont (12.23.24 @ 09:19)  IMPRESSION: Postop changes again identified with abnormal low-attenuation   involving the posterior fossa region associated hemorrhage. 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    There are bilateral subdural collections again identified.  Dilated lateral ventricles are seen with intraventricular air and   hemorrhage.    CT Head No Cont (12.24.24 @ 06:01)   IMPRESSION: Stable follow-up CT exam.  No new areas of acute intracranial hemorrhage are noted.    CT Head No Cont (12.22.24 @ 06:42)  IMPRESSION: No change since 12/21/2024 at 10:47 PM. Left suboccipital   craniectomy with postoperative changes and residual hemorrhage in the   cerebellar vermis. Intraventricular air and hemorrhage. Mild ventricular dilatation.    CT Head No Cont (12.21.24 @ 22:48)  IMPRESSION:  1. Decompression of the hematoma in the cerebellum, without interval rebleeding.    CT Head No Cont (12.21.24 @ 18:20)  IMPRESSION: Midline posterior fossa cerebellar hemorrhage is   re-demonstrated measuring 4.2 x 3.4 x 4.0 cm, mildly larger in size.   Severe mass effect on the fourth ventricle. Mild dissection of hemorrhage   into the fourth ventricle. Mild hydrocephalus. In the intraventricular   hemorrhage in the bilateral occipital horns. Small anterior left frontal   convexity subdural hygroma measuring 2.2 mm in thickness. Left-to-right   midline shift is 3.6 mm. Mild subarachnoid hemorrhage in the left cerebellum.  --  CARDIAC STUDIES:  TTE W or WO Ultrasound Enhancing Agent (12.22.24 @ 09:19)  CONCLUSIONS:   1. Left ventricularcavity is mildly dilated. Left ventricular systolic function is normal with an ejection fraction of 53 % by Ac's method of disks with an ejection fraction visually estimated at 50 to 55 %.   2. Moderate left ventricular hypertrophy.   3. There is mild (grade 1) left ventricular diastolic dysfunction, with elevated left ventricular filling pressure.   4. Normal right ventricular cavity size and probably normal right ventricular systolic function.   5. Mild to moderate aortic stenosis.   6. Trace aortic regurgitation.   7. Aortic root at the sinuses of Valsalva is dilated, measuring 4.20 cm (indexed 1.92 cm/m²). Ascending aorta is dilated, measuring 4.00 cm (indexed 1.83 cm/m²).

## 2024-12-26 NOTE — PROGRESS NOTE ADULT - SUBJECTIVE AND OBJECTIVE BOX
69 yr old male history of htn, hld , on afib eliquis with prior ischemic stroke 2018, frequent utis, kidney stones, turp, cabg x 2, 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  went to [place], ich, vomited gcs 13 intubated.  Transferred to [facility] for possible SOC.  On arrival patient is sedated on versed, propofol and fent. (21 Dec 2024 16:13)    24hr EVENTS: still with episodes of agitation with weaning off sedation.  Abundant ETT secretions.  ROS: [x ]  Unable to assess due to mental status     -----------------------------------------------------------------------------------------------------------------------------------------------------------------------------------    ICU Vital Signs Last 24 Hrs  T(C): 37.8 (26 Dec 2024 14:00), Max: 38.1 (25 Dec 2024 18:00)  T(F): 100 (26 Dec 2024 14:00), Max: 100.6 (25 Dec 2024 18:00)  HR: 73 (26 Dec 2024 14:00) (56 - 91)  BP: 130/75 (26 Dec 2024 14:00) (110/74 - 188/80)  BP(mean): 93 (26 Dec 2024 14:00) (82 - 130)  ABP: --  ABP(mean): --  RR: 21 (26 Dec 2024 14:00) (13 - 29)  SpO2: 99% (26 Dec 2024 14:00) (93% - 100%)    O2 Parameters below as of 26 Dec 2024 14:00  Patient On (Oxygen Delivery Method): ventilator    O2 Concentration (%): 50          -----------------------------------------------------------------------------------------------------------------------------------------------------------------------------------    PHYSICAL EXAM:  General: Calm, intubated, NAD  Neuro:  -Mental status- not FC, no EO, corneals +, dolls+, PERRL, cough present, o/b the vent;  moves BL UE spont and loc, BL LE spont.    CV: S1S2 present  Pulm: clear to auscultation BL  Abd: Soft, nontender, nondistended  Ext: no noted edema in lower ext  Skin: warm, dry

## 2024-12-26 NOTE — PROGRESS NOTE ADULT - SUBJECTIVE AND OBJECTIVE BOX
HPI:   69 yr old male history of htn, hld , on afib eliquis with prior ischemic stroke 2018, frequent utis, kidney stones, turp, cabg x 2, 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  went to [place], ich, vomited gcs 13 intubated.  Transferred to [facility] for possible SOC.  On arrival patient is sedated on versed, propofol and fent.    MRS 0  ICH score 3  NIHSS 25 (21 Dec 2024 16:13)    OVERNIGHT EVENTS:  KANDACE overnight. Neuro exam stable.    Vital Signs Last 24 Hrs  T(C): 37.4 (26 Dec 2024 02:00), Max: 38.1 (25 Dec 2024 18:00)  T(F): 99.3 (26 Dec 2024 02:00), Max: 100.6 (25 Dec 2024 18:00)  HR: 74 (26 Dec 2024 02:00) (57 - 98)  BP: 140/81 (26 Dec 2024 02:00) (110/74 - 218/104)  BP(mean): 99 (26 Dec 2024 02:00) (75 - 135)  RR: 17 (26 Dec 2024 02:00) (11 - 26)  SpO2: 98% (26 Dec 2024 02:00) (93% - 100%)    Parameters below as of 26 Dec 2024 00:00  Patient On (Oxygen Delivery Method): ventilator        I&O's Summary    24 Dec 2024 07:01  -  25 Dec 2024 07:00  --------------------------------------------------------  IN: 3940.5 mL / OUT: 1920 mL / NET: 2020.5 mL    25 Dec 2024 07:01  -  26 Dec 2024 03:11  --------------------------------------------------------  IN: 2721.5 mL / OUT: 1660 mL / NET: 1061.5 mL    PHYSICAL EXAM:  General: NAD, pt is comfortably sitting up in bed, intubated  Cardiovascular: RRR, normal S1 and S2   Respiratory: lungs CTAB, no wheezing, rhonchi, or crackles   GI: normoactive BS to auscultation, abd soft  Neuro: Not OE, not FC  CNII-CXII: PERRL 3mm briskly reactive, unable to assess EOM, +cough/gag/corneals  Motor: Localizing UE b/l, moving LE spontaneously and bending knees b/l   Extremities: distal pulses 2+ x4 symmetric  Wound/incision: SOC incision site C/D/I    LABS:                        13.8   12.03 )-----------( 130      ( 25 Dec 2024 03:03 )             42.9     12-25    145  |  115[H]  |  39.4[H]  ----------------------------<  174[H]  3.7   |  20.0[L]  |  1.42[H]    Ca    8.2[L]      25 Dec 2024 03:03  Phos  3.1     12-25  Mg     2.8     12-25    TPro  5.7[L]  /  Alb  2.8[L]  /  TBili  0.3[L]  /  DBili  0.1  /  AST  18  /  ALT  17  /  AlkPhos  50  12-25      Urinalysis Basic - ( 25 Dec 2024 03:03 )    Color: x / Appearance: x / SG: x / pH: x  Gluc: 174 mg/dL / Ketone: x  / Bili: x / Urobili: x   Blood: x / Protein: x / Nitrite: x   Leuk Esterase: x / RBC: x / WBC x   Sq Epi: x / Non Sq Epi: x / Bacteria: x          CAPILLARY BLOOD GLUCOSE      POCT Blood Glucose.: 136 mg/dL (25 Dec 2024 23:51)  POCT Blood Glucose.: 143 mg/dL (25 Dec 2024 17:21)  POCT Blood Glucose.: 173 mg/dL (25 Dec 2024 11:08)      Drug Levels: [] N/A  Valproic Acid Level, Serum: 26.1 ug/mL (12-25 @ 03:03)    CSF Analysis: [] N/A      Allergies    rocuronium (Rash (Severe))    Intolerances      MEDICATIONS:  Antibiotics:  piperacillin/tazobactam IVPB.. 3.375 Gram(s) IV Intermittent every 8 hours    Neuro:  chlordiazePOXIDE 50 milliGRAM(s) Oral every 6 hours  dexMEDEtomidine Infusion 0.2 MICROgram(s)/kG/Hr IV Continuous <Continuous>  fentaNYL    Injectable 50 MICROGram(s) IV Push every 2 hours PRN  LORazepam   Injectable 1 milliGRAM(s) IV Push every 2 hours PRN  ondansetron Injectable 4 milliGRAM(s) IV Push every 6 hours PRN  valproate sodium   IVPB 250 milliGRAM(s) IV Intermittent every 6 hours    Anticoagulation:  heparin   Injectable 5000 Unit(s) SubCutaneous every 8 hours    OTHER:  amLODIPine   Tablet 5 milliGRAM(s) Oral daily  atorvastatin 40 milliGRAM(s) Oral at bedtime  chlorhexidine 0.12% Liquid 15 milliLiter(s) Oral Mucosa every 12 hours  chlorhexidine 2% Cloths 1 Application(s) Topical daily  dextrose 50% Injectable 25 Gram(s) IV Push once  dextrose 50% Injectable 12.5 Gram(s) IV Push once  dextrose 50% Injectable 25 Gram(s) IV Push once  doxazosin 4 milliGRAM(s) Oral at bedtime  hydrALAZINE Injectable 10 milliGRAM(s) IV Push every 2 hours PRN  insulin lispro (ADMELOG) corrective regimen sliding scale   SubCutaneous every 6 hours  labetalol Injectable 10 milliGRAM(s) IV Push every 2 hours PRN  metoprolol tartrate 25 milliGRAM(s) Oral every 12 hours  pantoprazole  Injectable 40 milliGRAM(s) IV Push daily  polyethylene glycol 3350 17 Gram(s) Oral two times a day  senna 2 Tablet(s) Oral at bedtime    IVF:  dextrose 5%. 1000 milliLiter(s) IV Continuous <Continuous>  dextrose 5%. 1000 milliLiter(s) IV Continuous <Continuous>  folic acid Injectable 1 milliGRAM(s) IV Push daily  sodium chloride 0.9%. 1000 milliLiter(s) IV Continuous <Continuous>    CULTURES:  Culture Results:   Commensal mayra consistent with body site (12-24 @ 02:11)  Culture Results:   No growth at 24 hours (12-24 @ 02:05)    RADIOLOGY & ADDITIONAL TESTS:      ASSESSMENT:  69M PMH HTN, HLD, A fib on Eliquis, Ischemic stroke 2018, TURP, CAD, CABG x2, kidney stones, ETOH, [place] [facility] [place], found to have cerebellar ICH. POD 5 s/p suboccipital craniectomy with clot evacuation 12/21/24    PLAN  - Q4 neuro checks  - stat CTH for any changes in neuro exam  - MRI brain completed 12/25 with findings of associated postsurgical changes and small residual hematoma in the right cerebellar hemisphere  - HOB >30 degrees  - SBP goal 100-160, continue metoprolol and norvasc  - full ventilatory support  - Na goal 140-145  - alvares- plan to remove and TOV  - zosyn for aspiration PNA  - DVT ppx: SCDs, SQ heparin  Final plan to be discussed in AM rounds

## 2024-12-26 NOTE — PROGRESS NOTE ADULT - ASSESSMENT
69 yr old male with acute non-traumatic cerebellar ICH, with vasogenic edema, cerebellar compression;  obstructive hydrocephalus.  S/p decompressive SOC 12/21/2024.  Acute hypoxemic resp failure due to EtOH wdrl, neurologic injury. Re-intubated 12/23 .  Suspected KEN.  EtOH wdrl.  Fever, concern for aspiration PNA.  PMh of CAD/CABG x2, htn, hld , on afib eliquis with prior ischemic stroke 2018, frequent UTIs, kidney stones, turp, babesiasis sepsis 6/2024, etoh abuse.    Plan:  - cont neurochecks  - start cEEG in view of poor exam  - sedation with Precedex gtt - plan to minimize  - cont  Librium PO - keep as 50 q 6hrs for now + PRN IVp Ativan  - cont  VPA 25- q6hrs, check level and LFT 12/27  - add Oxy 5 q8hrs for 48 hrs for suspected pain syndr contributing to agitation   - maintain Osats>92%, normocapnia; SBT as tolerated  - maintain -160, avoid significant fluctuations; PRN meds  - HR control - aim for <110; cont Metoprolol  - hold full AC in view of fresh ICH  - monitor e-lytes, I/Os, IVf; maintain Na 140-150, BMP daily; cont flomax  - cont TF, bowel regimen; PPI for ulcer ppx  - monitor FS, aim for 120-180, ISS  - cont empiric Zosyn, sepsis w/up in progress - negative so far  - SCDs, switch to SQL for VTE ppx

## 2024-12-27 NOTE — DISCHARGE NOTE NURSING/CASE MANAGEMENT/SOCIAL WORK - PATIENT PORTAL LINK FT
You can access the FollowMyHealth Patient Portal offered by [facility] by registering at the following website: [email or web address]. By joining [employer]’s FollowMyHealth portal, you will also be able to view your health information using other applications (apps) compatible with our system.

## 2024-12-27 NOTE — PROGRESS NOTE ADULT - ASSESSMENT
COMPLETE A/P PENDING   ASSESSMENT:   The pt is a 69y M PMHx ischemic stroke (2018), atrial fibrillation on Eliquis, ETOH abuse, HTN, HLD, frequent UTI, hx kidney stones, s/p TURP, s/p CABG x2, and babesiasis sepsis in 06/2024 who [place] [facility] [place] complaining of dizziness while driving with associated nausea and confusion. [place] at [facility] GCS was noted to be 13. (+) vomiting. CT imaging revealed large cerebellar hemorrhage w/ severe mass effect and mild dissection into the fourth ventricle; associated hydrocephalus, 3.6mm left-to-right midline shift, b/l occipital horn intraventricular hemorrhage, 2.2mm left frontal convexity subdural hygroma and mild SAH in the Left cerebellum. Transferred to [facility] for suboccipital craniectomy.    Impression: Acute non-traumatic cerebellar ICH with vasogenic edema, cerebellar compression and obstructive hydrocephalus, s/p Left decompressive SOC 12/21/24. MRI w/ and w/o completed showing no   evidence of enhancing lesion. Etiology likely hypertensive with superimposed ETOH abuse vs underlying coagulopathy.     NEURO:   -Neurologically ***  -Continue close monitoring for neurologic deterioration    -Coma neuro checks q4hrs per NSICU recommendations   -MRI completed 12/25/24 with findings of associated postsurgical changes and small residual hematoma in the right cerebellar hemisphere.   -EEG  12/27/24 negative for seizure activity   -SBP goal <160, avoiding rapid fluctuations and hypotension.   -ANTITHROMBOTIC THERAPY: currently on hold in setting of ICH.   -Hx AF: Eliquis on hold in setting of ICH, reinitiation timeline per NS however from a stroke neuro standpoint would hold for at least 1 month.   -statin not indicated in the setting of ICH  -Dysphagia screen: fail, NGT in place  -Physical therapy/OT/Speech eval/treatment.   -TTE as noted   -Cardiac monitoring w/ telemetry           -DVT ppx: Heparin s.c [] LMWH [X] SCD []    -Maintain adequate hydration    -Na Goal: 135-145   -Monitor for si/sx of infection   -LDL/A1C as above  -Stroke education     OTHER: Condition and plan of care d/w primary team; questions and concerns addressed.     CORE MEASURES:        Admission NIHSS: 27     Tenecteplase : [] YES [X] NO      LDL/A1C: 88 / 6.0     Depression Screen - if depression hx and/or present      Statin Therapy:  N/a      Dysphagia Screen: [] PASS [X] FAIL -- NGT     Smoking [] YES [X] NO      Afib [X] YES [] NO     Stroke Education [x] YES 12/27/24 [] NO    ASSESSMENT:   The pt is a 69y M PMHx ischemic stroke (2018), atrial fibrillation on Eliquis, ETOH abuse, HTN, HLD, frequent UTI, hx kidney stones, s/p TURP, s/p CABG x2, and babesiasis sepsis in 06/2024 who initially presented to [facility] [place] on [place]/21/24 complaining of dizziness while driving with associated nausea and confusion. While at [facility], GCS was noted to be 13. (+) vomiting. CT imaging revealed large cerebellar hemorrhage w/ severe mass effect and mild dissection into the fourth ventricle; associated hydrocephalus, 3.6mm left-to-right midline shift, b/l occipital horn intraventricular hemorrhage, 2.2mm left frontal convexity subdural hygroma and mild SAH in the Left cerebellum. Transferred to [facility] for suboccipital craniectomy.    Impression: Acute non-traumatic cerebellar ICH with vasogenic edema, cerebellar compression and obstructive hydrocephalus, s/p Left decompressive SOC 12/21/24. MRI w/ and w/o completed showing no   evidence of enhancing lesion. Etiology likely hypertensive with superimposed ETOH abuse vs underlying coagulopathy.     NEURO:   -Neurologically off sedation, less responsive compared to previous examination   -Continue close monitoring for neurologic deterioration    -Coma neuro checks per NSICU recommendations   -MRI completed 12/25/24 with findings of associated postsurgical changes and small residual hematoma in the right cerebellar hemisphere.   -EEG  12/27/24 negative for seizure activity   -SBP goal <160, avoiding rapid fluctuations and hypotension.   -ANTITHROMBOTIC THERAPY: currently on hold in setting of ICH.   -Hx AF: Eliquis on hold in setting of ICH, reinitiation timeline per NS however from a stroke neuro standpoint would hold for at least 1 month.   -statin not indicated in the setting of ICH  -Dysphagia screen: fail, NGT in place  -Physical therapy/OT/Speech eval/treatment.   -TTE as noted   -Cardiac monitoring w/ telemetry           -DVT ppx: Heparin s.c [] LMWH [X] SCD []    -Maintain adequate hydration    -Na Goal: 135-145   -Monitor for si/sx of infection   -LDL/A1C as above  -Stroke education     OTHER: Condition and plan of care d/w primary team; questions and concerns addressed.     CORE MEASURES:        Admission NIHSS: 27     Tenecteplase : [] YES [X] NO      LDL/A1C: 88 / 6.0     Depression Screen - if depression hx and/or present      Statin Therapy:  N/a      Dysphagia Screen: [] PASS [X] FAIL -- NGT     Smoking [] YES [X] NO      Afib [X] YES [] NO     Stroke Education [x] YES 12/27/24 [] NO

## 2024-12-27 NOTE — PROGRESS NOTE ADULT - ASSESSMENT
69 yr old male with acute non-traumatic cerebellar ICH, with vasogenic edema, cerebellar compression; obstructive hydrocephalus.  S/p decompressive SOC 12/21/2024 and 12/27.  Acute hypoxemic resp failure due to EtOH wdrl, neurologic injury. Re-intubated 12/23 .  Suspected KEN.  EtOH wdrl.  Fever, concern for aspiration PNA.  PMh of CAD/CABG x2, htn, hld , on afib eliquis with prior ischemic stroke 2018, frequent UTIs, kidney stones, turp, babesiasis sepsis 6/2024, etoh abuse.    Plan:  - cont neurochecks  - no szs, d/c CEEG  - minimize sedation  - taper  Librium PO - 25 q 8 hrs for now, plan to wean over the next 2 days  - d/c VPA and Oxy  - maintain Osats>92%, normocapnia; vent support, SBT as tolerated  - maintain -140 postop, avoid significant fluctuations; PRN meds, on Cardene gtt; add Hydralazine  - HR control - aim for <110; cont Metoprolol  - hold full AC in view of fresh ICH  - monitor e-lytes, I/Os, IVf; maintain Na 140-150, BMP daily; cont flomax  - cont TF, bowel regimen; PPI for ulcer ppx  - monitor FS, aim for 120-180, ISS  - switch back to empiric Ceftriaxone (7 days in total), sepsis w/up in progress - negative so far  - SCDs, hold anti-thrombotics as fresh PO

## 2024-12-27 NOTE — PROGRESS NOTE ADULT - SUBJECTIVE AND OBJECTIVE BOX
69 yr old male history of htn, hld , on afib eliquis with prior ischemic stroke 2018, frequent utis, kidney stones, turp, cabg x 2, 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  went to [place], ich, vomited gcs 13 intubated.  Transferred to [facility] for possible SOC.  On arrival patient is sedated on versed, propofol and fent. (21 Dec 2024 16:13)    24hr EVENTS: worsening exam this am; CTh with R cerebellar ICH.  Went to OR for decompression.    ROS: [x ]  Unable to assess due to mental status     -----------------------------------------------------------------------------------------------------------------------------------------------------------------------------------        ICU Vital Signs Last 24 Hrs  T(C): 36.4 (27 Dec 2024 17:30), Max: 38.1 (26 Dec 2024 18:30)  T(F): 97.5 (27 Dec 2024 17:30), Max: 100.6 (26 Dec 2024 18:30)  HR: 65 (27 Dec 2024 17:30) (59 - 78)  BP: 140/72 (27 Dec 2024 17:30) (119/55 - 193/83)  BP(mean): 91 (27 Dec 2024 17:30) (75 - 112)  ABP: 170/54 (27 Dec 2024 15:15) (170/54 - 170/54)  ABP(mean): 85 (27 Dec 2024 15:15) (85 - 85)  RR: 19 (27 Dec 2024 17:30) (11 - 20)  SpO2: 100% (27 Dec 2024 17:30) (98% - 100%)    O2 Parameters below as of 27 Dec 2024 17:00  Patient On (Oxygen Delivery Method): ventilator    O2 Concentration (%): 50      -----------------------------------------------------------------------------------------------------------------------------------------------------------------------------------    PHYSICAL EXAM:  General: Calm, intubated, NAD  Neuro:  -Mental status- not FC, no EO, corneals +, dolls+, PERRL, cough present, o/b the vent;  LUE wdrl, RUE trace movements, BL LE spont.    CV: S1S2 present  Pulm: clear to auscultation BL  Abd: Soft, nontender, nondistended  Ext: no noted edema in lower ext  Skin: warm, dry

## 2024-12-27 NOTE — PROGRESS NOTE ADULT - SUBJECTIVE AND OBJECTIVE BOX
Preliminary note, offical recommendations pending attending review/signature   [facility] Stroke Team  Progress Note     HPI:  The pt is a 69y M PMHx ischemic stroke (2018), atrial fibrillation on Eliquis, ETOH abuse, HTN, HLD, frequent UTI, hx kidney stones, s/p TURP, s/p CABG x2, and babesiasis sepsis in 06/2024 who initially presented to [facility] [place] on 12/21/24 complaining of dizziness while driving with associated nausea and confusion. While at [facility] [place], GCS was noted to be 13. (+) vomiting. CT imaging revealed large cerebellar hemorrhage w/ severe mass effect and mild dissection into the fourth ventricle; associated hydrocephalus, 3.6mm left-to-right midline shift, b/l occipital horn intraventricular hemorrhage and mild SAH in the Left cerebellum. Also notable for 2.2mm left frontal convexity subdural hygroma. Patient was subsequently intubated and transferred to [facility] for possible suboccipital craniectomy. Upon arrival patient was sedated on Versed, Propofol and Fentanyl; admitted to NSICU. Now s/p decompressive SOC 12/21/2024.     SUBJECTIVE: No events overnight. Patient remains intubated. Unable to obtain complete ROS secondary to intubated status.     amLODIPine   Tablet 10 milliGRAM(s) Oral daily  atorvastatin 40 milliGRAM(s) Oral at bedtime  chlordiazePOXIDE 25 milliGRAM(s) Oral every 8 hours  chlorhexidine 0.12% Liquid 15 milliLiter(s) Oral Mucosa every 12 hours  chlorhexidine 2% Cloths 1 Application(s) Topical daily  dexMEDEtomidine Infusion 0.2 MICROgram(s)/kG/Hr IV Continuous <Continuous>  doxazosin 4 milliGRAM(s) Oral at bedtime  enoxaparin Injectable 40 milliGRAM(s) SubCutaneous <User Schedule>  fentaNYL    Injectable 50 MICROGram(s) IV Push every 2 hours PRN  folic acid Injectable 1 milliGRAM(s) IV Push daily  hydrALAZINE Injectable 10 milliGRAM(s) IV Push every 2 hours PRN  insulin lispro (ADMELOG) corrective regimen sliding scale   SubCutaneous every 6 hours  labetalol Injectable 10 milliGRAM(s) IV Push every 2 hours PRN  LORazepam   Injectable 1 milliGRAM(s) IV Push every 2 hours PRN  metoprolol tartrate 25 milliGRAM(s) Oral every 8 hours  ondansetron Injectable 4 milliGRAM(s) IV Push every 6 hours PRN  pantoprazole  Injectable 40 milliGRAM(s) IV Push daily  piperacillin/tazobactam IVPB.. 3.375 Gram(s) IV Intermittent every 8 hours  polyethylene glycol 3350 17 Gram(s) Oral daily  senna 2 Tablet(s) Oral at bedtime  valproate sodium   IVPB 250 milliGRAM(s) IV Intermittent every 6 hours      PHYSICAL EXAM:   Vital Signs Last 24 Hrs  T(C): 37.3 (27 Dec 2024 08:00), Max: 38.3 (26 Dec 2024 18:00)  T(F): 99.1 (27 Dec 2024 08:00), Max: 100.9 (26 Dec 2024 18:00)  HR: 65 (27 Dec 2024 08:00) (57 - 91)  BP: 137/54 (27 Dec 2024 08:00) (119/55 - 193/83)  BP(mean): 75 (27 Dec 2024 08:00) (75 - 112)  RR: 15 (27 Dec 2024 08:00) (11 - 29)  SpO2: 100% (27 Dec 2024 08:00) (95% - 100%)    Parameters below as of 26 Dec 2024 20:00  Patient On (Oxygen Delivery Method): ventilator    **COMPLETE EXAM PENDING***  Limited exam secondary to intubated/sedated status     General: No acute distress    NEUROLOGICAL EXAM:    Mental status: Intubated and sedated. Gag and cough intact. Patient not following commands. No verbal output.     Cranial nerves: Pupils equal and react symmetrically to light, sluggish. Dysconjugate gaze. (+) corneal reflexes b/l. +Weak occulocephalics b/l.     Motor: There is no tremor.  3/5 LUE  3/5 RUE  2/5 LLE  2/5 RLE     Sensation: Withdraws in all extremities to painful stimuli except RUE    Cerebellar: deferred    Gait : deferred      LABS:                        14.1   10.54 )-----------( 155      ( 27 Dec 2024 03:25 )             44.9    12-27    149[H]  |  116[H]  |  27.9[H]  ----------------------------<  199[H]  4.2   |  22.0  |  1.05    Ca    8.6      27 Dec 2024 03:25  Phos  3.3     12-27  Mg     2.6     12-27    TPro  5.7[L]  /  Alb  2.7[L]  /  TBili  0.4  /  DBili  0.1  /  AST  34  /  ALT  55[H]  /  AlkPhos  49  12-27      LDL 88  A1C 6.0    IMAGING:    MR Head w/wo IV Cont (12.25.24 @ 12:26)  IMPRESSION:  Status post suboccipital craniectomy for partial evacuation of right   cerebellar hematoma with associated postsurgical changes as detailed   above. Small residual hematoma the right cerebellar hemisphere. No   evidence of enhancing lesion although evaluation of the right cerebellar   hematoma site is partially degraded by intrinsic T1 hyperintense signal   from early subacute hemorrhage.    CT Head No Cont (12.24.24 @ 06:01)   IMPRESSION: Stable follow-up CT exam.  No new areas of acute intracranial hemorrhage are noted.    CT Head No Cont (12.23.24 @ 09:19)  IMPRESSION: Postop changes again identified with abnormal low-attenuation   involving the posterior fossa region associated hemorrhage. 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    There are bilateral subdural collections again identified.  Dilated lateral ventricles are seen with intraventricular air and   hemorrhage.    CT Head No Cont (12.24.24 @ 06:01)   IMPRESSION: Stable follow-up CT exam.  No new areas of acute intracranial hemorrhage are noted.    CT Head No Cont (12.22.24 @ 06:42)  IMPRESSION: No change since 12/21/2024 at 10:47 PM. Left suboccipital   craniectomy with postoperative changes and residual hemorrhage in the   cerebellar vermis. Intraventricular air and hemorrhage. Mild ventricular dilatation.    CT Head No Cont (12.21.24 @ 22:48)  IMPRESSION:  1. Decompression of the hematoma in the cerebellum, without interval rebleeding.    CT Head No Cont (12.21.24 @ 18:20)  IMPRESSION: Midline posterior fossa cerebellar hemorrhage is   re-demonstrated measuring 4.2 x 3.4 x 4.0 cm, mildly larger in size.   Severe mass effect on the fourth ventricle. Mild dissection of hemorrhage   into the fourth ventricle. Mild hydrocephalus. In the intraventricular   hemorrhage in the bilateral occipital horns. Small anterior left frontal   convexity subdural hygroma measuring 2.2 mm in thickness. Left-to-right   midline shift is 3.6 mm. Mild subarachnoid hemorrhage in the left cerebellum.  --  CARDIAC STUDIES:  TTE W or WO Ultrasound Enhancing Agent (12.22.24 @ 09:19)  CONCLUSIONS:   1. Left ventricularcavity is mildly dilated. Left ventricular systolic function is normal with an ejection fraction of 53 % by Ac's method of disks with an ejection fraction visually estimated at 50 to 55 %.   2. Moderate left ventricular hypertrophy.   3. There is mild (grade 1) left ventricular diastolic dysfunction, with elevated left ventricular filling pressure.   4. Normal right ventricular cavity size and probably normal right ventricular systolic function.   5. Mild to moderate aortic stenosis.   6. Trace aortic regurgitation.   7. Aortic root at the sinuses of Valsalva is dilated, measuring 4.20 cm (indexed 1.92 cm/m²). Ascending aorta is dilated, measuring 4.00 cm (indexed 1.83 cm/m²).  --  EEG 12/27/24:  Clinical Impression:   1. Severe diffuse cerebral dysfunction, nonspecific in origin, but likely in the setting of chemical sedation.   2. There were no epileptiform abnormalities recorded.   Preliminary note, offical recommendations pending attending review/signature   [facility] Stroke Team  Progress Note     HPI:  The pt is a 69y M PMHx ischemic stroke (2018), atrial fibrillation on Eliquis, ETOH abuse, HTN, HLD, frequent UTI, hx kidney stones, s/p TURP, s/p CABG x2, and babesiasis sepsis in 06/2024 who initially presented to [facility] [place] on 12/21/24 complaining of dizziness while driving with associated nausea and confusion. While at [facility] [place], GCS was noted to be 13. (+) vomiting. CT imaging revealed large cerebellar hemorrhage w/ severe mass effect and mild dissection into the fourth ventricle; associated hydrocephalus, 3.6mm left-to-right midline shift, b/l occipital horn intraventricular hemorrhage and mild SAH in the Left cerebellum. Also notable for 2.2mm left frontal convexity subdural hygroma. Patient was subsequently intubated and transferred to [facility] for possible suboccipital craniectomy. Upon arrival patient was sedated on Versed, Propofol and Fentanyl; admitted to NSICU. Now s/p decompressive SOC 12/21/2024.     SUBJECTIVE: No events overnight. Patient remains intubated. Unable to obtain complete ROS secondary to intubated status.     amLODIPine   Tablet 10 milliGRAM(s) Oral daily  atorvastatin 40 milliGRAM(s) Oral at bedtime  chlordiazePOXIDE 25 milliGRAM(s) Oral every 8 hours  chlorhexidine 0.12% Liquid 15 milliLiter(s) Oral Mucosa every 12 hours  chlorhexidine 2% Cloths 1 Application(s) Topical daily  dexMEDEtomidine Infusion 0.2 MICROgram(s)/kG/Hr IV Continuous <Continuous>  doxazosin 4 milliGRAM(s) Oral at bedtime  enoxaparin Injectable 40 milliGRAM(s) SubCutaneous <User Schedule>  fentaNYL    Injectable 50 MICROGram(s) IV Push every 2 hours PRN  folic acid Injectable 1 milliGRAM(s) IV Push daily  hydrALAZINE Injectable 10 milliGRAM(s) IV Push every 2 hours PRN  insulin lispro (ADMELOG) corrective regimen sliding scale   SubCutaneous every 6 hours  labetalol Injectable 10 milliGRAM(s) IV Push every 2 hours PRN  LORazepam   Injectable 1 milliGRAM(s) IV Push every 2 hours PRN  metoprolol tartrate 25 milliGRAM(s) Oral every 8 hours  ondansetron Injectable 4 milliGRAM(s) IV Push every 6 hours PRN  pantoprazole  Injectable 40 milliGRAM(s) IV Push daily  piperacillin/tazobactam IVPB.. 3.375 Gram(s) IV Intermittent every 8 hours  polyethylene glycol 3350 17 Gram(s) Oral daily  senna 2 Tablet(s) Oral at bedtime  valproate sodium   IVPB 250 milliGRAM(s) IV Intermittent every 6 hours      PHYSICAL EXAM:   Vital Signs Last 24 Hrs  T(C): 37.3 (27 Dec 2024 08:00), Max: 38.3 (26 Dec 2024 18:00)  T(F): 99.1 (27 Dec 2024 08:00), Max: 100.9 (26 Dec 2024 18:00)  HR: 65 (27 Dec 2024 08:00) (57 - 91)  BP: 137/54 (27 Dec 2024 08:00) (119/55 - 193/83)  BP(mean): 75 (27 Dec 2024 08:00) (75 - 112)  RR: 15 (27 Dec 2024 08:00) (11 - 29)  SpO2: 100% (27 Dec 2024 08:00) (95% - 100%)    Parameters below as of 26 Dec 2024 20:00  Patient On (Oxygen Delivery Method): ventilator    Limited exam secondary to intubated status     General: ET tube in place. Off IV sedation.     NEUROLOGICAL EXAM:    Mental status: Intubated. Gag and cough intact. Patient not following commands. No verbal output.     Cranial nerves: Pupils equal and react symmetrically to light, sluggish. Dysconjugate gaze. (+) corneal reflexes b/l. +Weak occulocephalics b/l.     Motor: There is no tremor.  0/5 in bilateral upper and lower extremities     Sensation: No withdrawal to painful stimuli in all extremities     Cerebellar: deferred    Gait : deferred      LABS:                        14.1   10.54 )-----------( 155      ( 27 Dec 2024 03:25 )             44.9    12-27    149[H]  |  116[H]  |  27.9[H]  ----------------------------<  199[H]  4.2   |  22.0  |  1.05    Ca    8.6      27 Dec 2024 03:25  Phos  3.3     12-27  Mg     2.6     12-27    TPro  5.7[L]  /  Alb  2.7[L]  /  TBili  0.4  /  DBili  0.1  /  AST  34  /  ALT  55[H]  /  AlkPhos  49  12-27      LDL 88  A1C 6.0    IMAGING:    MR Head w/wo IV Cont (12.25.24 @ 12:26)  IMPRESSION:  Status post suboccipital craniectomy for partial evacuation of right   cerebellar hematoma with associated postsurgical changes as detailed   above. Small residual hematoma the right cerebellar hemisphere. No   evidence of enhancing lesion although evaluation of the right cerebellar   hematoma site is partially degraded by intrinsic T1 hyperintense signal   from early subacute hemorrhage.    CT Head No Cont (12.24.24 @ 06:01)   IMPRESSION: Stable follow-up CT exam.  No new areas of acute intracranial hemorrhage are noted.    CT Head No Cont (12.23.24 @ 09:19)  IMPRESSION: Postop changes again identified with abnormal low-attenuation   involving the posterior fossa region associated hemorrhage. 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    There are bilateral subdural collections again identified.  Dilated lateral ventricles are seen with intraventricular air and   hemorrhage.    CT Head No Cont (12.24.24 @ 06:01)   IMPRESSION: Stable follow-up CT exam.  No new areas of acute intracranial hemorrhage are noted.    CT Head No Cont (12.22.24 @ 06:42)  IMPRESSION: No change since 12/21/2024 at 10:47 PM. Left suboccipital   craniectomy with postoperative changes and residual hemorrhage in the   cerebellar vermis. Intraventricular air and hemorrhage. Mild ventricular dilatation.    CT Head No Cont (12.21.24 @ 22:48)  IMPRESSION:  1. Decompression of the hematoma in the cerebellum, without interval rebleeding.    CT Head No Cont (12.21.24 @ 18:20)  IMPRESSION: Midline posterior fossa cerebellar hemorrhage is   re-demonstrated measuring 4.2 x 3.4 x 4.0 cm, mildly larger in size.   Severe mass effect on the fourth ventricle. Mild dissection of hemorrhage   into the fourth ventricle. Mild hydrocephalus. In the intraventricular   hemorrhage in the bilateral occipital horns. Small anterior left frontal   convexity subdural hygroma measuring 2.2 mm in thickness. Left-to-right   midline shift is 3.6 mm. Mild subarachnoid hemorrhage in the left cerebellum.  --  CARDIAC STUDIES:  TTE W or WO Ultrasound Enhancing Agent (12.22.24 @ 09:19)  CONCLUSIONS:   1. Left ventricularcavity is mildly dilated. Left ventricular systolic function is normal with an ejection fraction of 53 % by Ac's method of disks with an ejection fraction visually estimated at 50 to 55 %.   2. Moderate left ventricular hypertrophy.   3. There is mild (grade 1) left ventricular diastolic dysfunction, with elevated left ventricular filling pressure.   4. Normal right ventricular cavity size and probably normal right ventricular systolic function.   5. Mild to moderate aortic stenosis.   6. Trace aortic regurgitation.   7. Aortic root at the sinuses of Valsalva is dilated, measuring 4.20 cm (indexed 1.92 cm/m²). Ascending aorta is dilated, measuring 4.00 cm (indexed 1.83 cm/m²).  --  EEG 12/27/24:  Clinical Impression:   1. Severe diffuse cerebral dysfunction, nonspecific in origin, but likely in the setting of chemical sedation.   2. There were no epileptiform abnormalities recorded.     [facility] Stroke Team  Progress Note     HPI:  The pt is a 69y M PMHx ischemic stroke (2018), atrial fibrillation on Eliquis, ETOH abuse, HTN, HLD, frequent UTI, hx kidney stones, s/p TURP, s/p CABG x2, and babesiasis sepsis in 06/2024 who initially presented to [facility] [place] on 12/21/24 complaining of dizziness while driving with associated nausea and confusion. While at [facility] [place], GCS was noted to be 13. (+) vomiting. CT imaging revealed large cerebellar hemorrhage w/ severe mass effect and mild dissection into the fourth ventricle; associated hydrocephalus, 3.6mm left-to-right midline shift, b/l occipital horn intraventricular hemorrhage and mild SAH in the Left cerebellum. Also notable for 2.2mm left frontal convexity subdural hygroma. Patient was subsequently intubated and transferred to [facility] for possible suboccipital craniectomy. Upon arrival patient was sedated on Versed, Propofol and Fentanyl; admitted to NSICU. Now s/p decompressive SOC 12/21/2024.     SUBJECTIVE: No events overnight. Patient remains intubated. Unable to obtain complete ROS secondary to intubated status.     amLODIPine   Tablet 10 milliGRAM(s) Oral daily  atorvastatin 40 milliGRAM(s) Oral at bedtime  chlordiazePOXIDE 25 milliGRAM(s) Oral every 8 hours  chlorhexidine 0.12% Liquid 15 milliLiter(s) Oral Mucosa every 12 hours  chlorhexidine 2% Cloths 1 Application(s) Topical daily  dexMEDEtomidine Infusion 0.2 MICROgram(s)/kG/Hr IV Continuous <Continuous>  doxazosin 4 milliGRAM(s) Oral at bedtime  enoxaparin Injectable 40 milliGRAM(s) SubCutaneous <User Schedule>  fentaNYL    Injectable 50 MICROGram(s) IV Push every 2 hours PRN  folic acid Injectable 1 milliGRAM(s) IV Push daily  hydrALAZINE Injectable 10 milliGRAM(s) IV Push every 2 hours PRN  insulin lispro (ADMELOG) corrective regimen sliding scale   SubCutaneous every 6 hours  labetalol Injectable 10 milliGRAM(s) IV Push every 2 hours PRN  LORazepam   Injectable 1 milliGRAM(s) IV Push every 2 hours PRN  metoprolol tartrate 25 milliGRAM(s) Oral every 8 hours  ondansetron Injectable 4 milliGRAM(s) IV Push every 6 hours PRN  pantoprazole  Injectable 40 milliGRAM(s) IV Push daily  piperacillin/tazobactam IVPB.. 3.375 Gram(s) IV Intermittent every 8 hours  polyethylene glycol 3350 17 Gram(s) Oral daily  senna 2 Tablet(s) Oral at bedtime  valproate sodium   IVPB 250 milliGRAM(s) IV Intermittent every 6 hours      PHYSICAL EXAM:   Vital Signs Last 24 Hrs  T(C): 37.3 (27 Dec 2024 08:00), Max: 38.3 (26 Dec 2024 18:00)  T(F): 99.1 (27 Dec 2024 08:00), Max: 100.9 (26 Dec 2024 18:00)  HR: 65 (27 Dec 2024 08:00) (57 - 91)  BP: 137/54 (27 Dec 2024 08:00) (119/55 - 193/83)  BP(mean): 75 (27 Dec 2024 08:00) (75 - 112)  RR: 15 (27 Dec 2024 08:00) (11 - 29)  SpO2: 100% (27 Dec 2024 08:00) (95% - 100%)    Parameters below as of 26 Dec 2024 20:00  Patient On (Oxygen Delivery Method): ventilator    Limited exam secondary to intubated status     General: ET tube in place. Off IV sedation.     NEUROLOGICAL EXAM:    Mental status: Intubated. Gag and cough intact. Patient not following commands. No verbal output.     Cranial nerves: Pupils equal and react symmetrically to light, sluggish. Dysconjugate gaze. (+) corneal reflexes b/l. +Weak occulocephalics b/l.     Motor: There is no tremor.  0/5 in bilateral upper and lower extremities     Sensation: No withdrawal to painful stimuli in all extremities     Cerebellar: deferred    Gait : deferred      LABS:                        14.1   10.54 )-----------( 155      ( 27 Dec 2024 03:25 )             44.9    12-27    149[H]  |  116[H]  |  27.9[H]  ----------------------------<  199[H]  4.2   |  22.0  |  1.05    Ca    8.6      27 Dec 2024 03:25  Phos  3.3     12-27  Mg     2.6     12-27    TPro  5.7[L]  /  Alb  2.7[L]  /  TBili  0.4  /  DBili  0.1  /  AST  34  /  ALT  55[H]  /  AlkPhos  49  12-27      LDL 88  A1C 6.0    IMAGING:    MR Head w/wo IV Cont (12.25.24 @ 12:26)  IMPRESSION:  Status post suboccipital craniectomy for partial evacuation of right   cerebellar hematoma with associated postsurgical changes as detailed   above. Small residual hematoma the right cerebellar hemisphere. No   evidence of enhancing lesion although evaluation of the right cerebellar   hematoma site is partially degraded by intrinsic T1 hyperintense signal   from early subacute hemorrhage.    CT Head No Cont (12.24.24 @ 06:01)   IMPRESSION: Stable follow-up CT exam.  No new areas of acute intracranial hemorrhage are noted.    CT Head No Cont (12.23.24 @ 09:19)  IMPRESSION: Postop changes again identified with abnormal low-attenuation   involving the posterior fossa region associated hemorrhage. 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    There are bilateral subdural collections again identified.  Dilated lateral ventricles are seen with intraventricular air and   hemorrhage.    CT Head No Cont (12.24.24 @ 06:01)   IMPRESSION: Stable follow-up CT exam.  No new areas of acute intracranial hemorrhage are noted.    CT Head No Cont (12.22.24 @ 06:42)  IMPRESSION: No change since 12/21/2024 at 10:47 PM. Left suboccipital   craniectomy with postoperative changes and residual hemorrhage in the   cerebellar vermis. Intraventricular air and hemorrhage. Mild ventricular dilatation.    CT Head No Cont (12.21.24 @ 22:48)  IMPRESSION:  1. Decompression of the hematoma in the cerebellum, without interval rebleeding.    CT Head No Cont (12.21.24 @ 18:20)  IMPRESSION: Midline posterior fossa cerebellar hemorrhage is   re-demonstrated measuring 4.2 x 3.4 x 4.0 cm, mildly larger in size.   Severe mass effect on the fourth ventricle. Mild dissection of hemorrhage   into the fourth ventricle. Mild hydrocephalus. In the intraventricular   hemorrhage in the bilateral occipital horns. Small anterior left frontal   convexity subdural hygroma measuring 2.2 mm in thickness. Left-to-right   midline shift is 3.6 mm. Mild subarachnoid hemorrhage in the left cerebellum.  --  CARDIAC STUDIES:  TTE W or WO Ultrasound Enhancing Agent (12.22.24 @ 09:19)  CONCLUSIONS:   1. Left ventricularcavity is mildly dilated. Left ventricular systolic function is normal with an ejection fraction of 53 % by Ac's method of disks with an ejection fraction visually estimated at 50 to 55 %.   2. Moderate left ventricular hypertrophy.   3. There is mild (grade 1) left ventricular diastolic dysfunction, with elevated left ventricular filling pressure.   4. Normal right ventricular cavity size and probably normal right ventricular systolic function.   5. Mild to moderate aortic stenosis.   6. Trace aortic regurgitation.   7. Aortic root at the sinuses of Valsalva is dilated, measuring 4.20 cm (indexed 1.92 cm/m²). Ascending aorta is dilated, measuring 4.00 cm (indexed 1.83 cm/m²).  --  EEG 12/27/24:  Clinical Impression:   1. Severe diffuse cerebral dysfunction, nonspecific in origin, but likely in the setting of chemical sedation.   2. There were no epileptiform abnormalities recorded.

## 2024-12-27 NOTE — DISCHARGE NOTE NURSING/CASE MANAGEMENT/SOCIAL WORK - FINANCIAL ASSISTANCE
[facility] provides services at a reduced cost to those who are determined to be eligible through [facility]’s financial assistance program. Information regarding [facility]’s financial assistance program can be found by going to [email or web address] or by calling [phone].

## 2024-12-27 NOTE — EEG REPORT - NS EEG TEXT BOX
MERRITT, CLAUDE [facility]-[id number]     Study Date: 		12-26-24 (1419) - 12-27-24 (0800)   Duration: 17hr 40min  --------------------------------------------------------------------------------------------------  History:  CC/ HPI Patient is a 69y old  Male who presents with a chief complaint of ICH (27 Dec 2024 01:34)    MEDICATIONS  (STANDING):  dexMEDEtomidine Infusion 0.2 MICROgram(s)/kG/Hr (5.09 mL/Hr) IV Continuous <Continuous>  valproate sodium   IVPB 250 milliGRAM(s) IV Intermittent every 6 hours    --------------------------------------------------------------------------------------------------  Study Interpretation:    [[[Abbreviation Key:  PDR=alpha rhythm/posterior dominant rhythm. A-P=anterior posterior gradient.  Amplitude: ‘very low’:<20; ‘low’:20-50; ‘medium’:; ‘high’:>200uV.  Persistence for periodic/rhythmic patterns (% of epoch) ‘rare’:<1%; ‘occasional’:1-10%; ‘frequent’:10-50%; ‘abundant’:50-90%; ‘continuous’:>90%.  Persistence for sporadic discharges: ‘rare’:<1/hr; ‘occasional’:1/min-1/hr; ‘frequent’:>1/min; ‘abundant’:>1/10 sec.  GRDA=generalized rhythmic delta activity; FIRDA=frontal intermittent GRDA; LRDA=lateralized rhythmic delta activity; TIRDA=temporal intermittent rhythmic delta activity;  LPD=PLED=lateralized periodic discharges; GPD=generalized periodic discharges; BiPDs=BiPLEDs=bilateral independent periodic epileptiform discharges; SIRPID=stimulus induced rhythmic, periodic, or ictal appearing discharges; BIRDs=brief potentially ictal rhythmic discharges >4 Hz, lasting .5-10s; PFA=paroxysmal bursts of beta/gamma; LVFA=low voltage fast activity.  Modifiers: +F=with fast component; +S=with spike component; +R=with rhythmic component.  S-B=burst suppression pattern.  Max=maximal. N1-drowsy; N2-stage II sleep; N3-slow wave sleep. SSS/BETS=small sharp spikes/benign epileptiform transients of sleep. HV=hyperventilation; PS=photic stimulation]]]    Daily EEG Visual Analysis    FINDINGS:      Background:  Continuity: continuous  Symmetry: symmetric  PDR: none  Reactivity: present  Voltage: severe voltage attenuation   Anterior Posterior Gradient: absent  Other background findings: none  Breach: absent    Background Slowing:  Generalized slowing: diffuse delta slowing   Focal slowing: none was present.    State Changes:   -Absent    Sporadic Epileptiform Discharges:    None    Rhythmic and Periodic Patterns (RPPs):  None     Electrographic and Electroclinical seizures:  None    Other Clinical Events:  None    Activation Procedures:   -Hyperventilation was not performed.    -Photic stimulation was not performed.     Artifacts:  Intermittent myogenic and movement artifacts were noted.    ECG:  The heart rate on single channel ECG was intermittently irregular, predominantly between 70-90 BPM.    EEG Classification / Summary:  Abnormal EEG in a comatose patient:   1. Generalized background slowing, severe    Clinical Impression:   1. Severe diffuse cerebral dysfunction, nonspecific in origin, but likely in the setting of chemical sedation.   2. There were no epileptiform abnormalities recorded.      *PRELIM READ, ATTENDING REVIEW PENDING*       -------------------------------------------------------------------------------------------------------  [facility] EEG Reading Room Ph#: [phone]  Epilepsy Answering Service after 5PM and before 8:30AM: Ph#: [phone]    Cipriano Adame, DO   Epilepsy Fellow    MERRITT, CLAUDE [facility]-[id number]     Study Date: 		12-26-24 (1419) - 12-27-24 (0800)   Duration: 17hr 40min  --------------------------------------------------------------------------------------------------  History:  CC/ HPI Patient is a 69y old  Male who presents with a chief complaint of ICH (27 Dec 2024 01:34)    MEDICATIONS  (STANDING):  dexMEDEtomidine Infusion 0.2 MICROgram(s)/kG/Hr (5.09 mL/Hr) IV Continuous <Continuous>  valproate sodium   IVPB 250 milliGRAM(s) IV Intermittent every 6 hours    --------------------------------------------------------------------------------------------------  Study Interpretation:    [[[Abbreviation Key:  PDR=alpha rhythm/posterior dominant rhythm. A-P=anterior posterior gradient.  Amplitude: ‘very low’:<20; ‘low’:20-50; ‘medium’:; ‘high’:>200uV.  Persistence for periodic/rhythmic patterns (% of epoch) ‘rare’:<1%; ‘occasional’:1-10%; ‘frequent’:10-50%; ‘abundant’:50-90%; ‘continuous’:>90%.  Persistence for sporadic discharges: ‘rare’:<1/hr; ‘occasional’:1/min-1/hr; ‘frequent’:>1/min; ‘abundant’:>1/10 sec.  GRDA=generalized rhythmic delta activity; FIRDA=frontal intermittent GRDA; LRDA=lateralized rhythmic delta activity; TIRDA=temporal intermittent rhythmic delta activity;  LPD=PLED=lateralized periodic discharges; GPD=generalized periodic discharges; BiPDs=BiPLEDs=bilateral independent periodic epileptiform discharges; SIRPID=stimulus induced rhythmic, periodic, or ictal appearing discharges; BIRDs=brief potentially ictal rhythmic discharges >4 Hz, lasting .5-10s; PFA=paroxysmal bursts of beta/gamma; LVFA=low voltage fast activity.  Modifiers: +F=with fast component; +S=with spike component; +R=with rhythmic component.  S-B=burst suppression pattern.  Max=maximal. N1-drowsy; N2-stage II sleep; N3-slow wave sleep. SSS/BETS=small sharp spikes/benign epileptiform transients of sleep. HV=hyperventilation; PS=photic stimulation]]]    Daily EEG Visual Analysis    FINDINGS:      Background:  Continuity: continuous  Symmetry: symmetric  PDR: none  Reactivity: present  Voltage: severe voltage attenuation   Anterior Posterior Gradient: absent  Other background findings: none  Breach: absent    Background Slowing:  Generalized slowing: diffuse delta slowing   Focal slowing: none was present.    State Changes:   -Absent    Sporadic Epileptiform Discharges:    None    Rhythmic and Periodic Patterns (RPPs):  None     Electrographic and Electroclinical seizures:  None    Other Clinical Events:  None    Activation Procedures:   -Hyperventilation was not performed.    -Photic stimulation was not performed.     Artifacts:  Intermittent myogenic and movement artifacts were noted.    ECG:  The heart rate on single channel ECG was intermittently irregular, predominantly between 70-90 BPM.    EEG Classification / Summary:  Abnormal EEG in a comatose patient:   1. Generalized background slowing, severe    Clinical Impression:   1. Severe diffuse cerebral dysfunction, nonspecific in etiology.   2. There were no epileptiform abnormalities recorded.      -------------------------------------------------------------------------------------------------------  [facility] EEG Reading Room Ph#: [phone]  Epilepsy Answering Service after 5PM and before 8:30AM: Ph#: [phone]    Cipriano Adame DO   Epilepsy Fellow     Tyrone Rodgers MD  Neurology Attending Physician

## 2024-12-27 NOTE — BRIEF OPERATIVE NOTE - FIRST ASSIST PARTICIPATION DETAILS - (COMPONENTS OF THE PROCEDURE THAT ASSISTANT PARTICIPATED IN)
clot evacuation
I acted within this role throughout the entirety of the procedure performed by the primary surgeon

## 2024-12-27 NOTE — PROGRESS NOTE ADULT - SUBJECTIVE AND OBJECTIVE BOX
HPI:   69 yr old male history of htn, hld , on afib eliquis with prior ischemic stroke 2018, frequent utis, kidney stones, turp, cabg x 2, 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  went to [place], ich, vomited gcs 13 intubated.  Transferred to [facility] for possible SOC.  On arrival patient is sedated on versed, propofol and fent.    MRS 0  ICH score 3  NIHSS 25 (21 Dec 2024 16:13)    OVERNIGHT EVENTS:  Increased norvasc to 5mg qd for persistent elevated BP. Neuro exam stable.    ICU Vital Signs Last 24 Hrs  T(C): 37.9 (26 Dec 2024 19:00), Max: 38.3 (26 Dec 2024 18:00)  T(F): 100.2 (26 Dec 2024 19:00), Max: 100.9 (26 Dec 2024 18:00)  HR: 63 (26 Dec 2024 20:28) (56 - 91)  BP: 160/79 (26 Dec 2024 19:00) (130/75 - 180/80)  BP(mean): 104 (26 Dec 2024 19:00) (84 - 112)  ABP: --  ABP(mean): --  RR: 20 (26 Dec 2024 19:00) (17 - 29)  SpO2: 100% (26 Dec 2024 20:28) (94% - 100%)    O2 Parameters below as of 26 Dec 2024 18:00  Patient On (Oxygen Delivery Method): ventilator    O2 Concentration (%): 50      I&O's Summary    25 Dec 2024 07:01  -  26 Dec 2024 07:00  --------------------------------------------------------  IN: 3510 mL / OUT: 2460 mL / NET: 1050 mL    26 Dec 2024 07:01  -  27 Dec 2024 01:35  --------------------------------------------------------  IN: 1318.6 mL / OUT: 395 mL / NET: 923.6 mL        PHYSICAL EXAM:  General: NAD, pt is comfortably sitting up in bed, intubated  Cardiovascular: RRR, normal S1 and S2   Respiratory: lungs CTAB, no wheezing, rhonchi, or crackles   GI: normoactive BS to auscultation, abd soft  Neuro: Not OE, not FC  CNII-CXII: PERRL 3mm briskly reactive, unable to assess EOM, +cough/gag/corneals  Motor: Localizing UE b/l, briskly withdrawing lower extremities   Extremities: distal pulses 2+ x4 symmetric  Wound/incision: SOC incision site C/D/I    LABS:                        13.8   11.13 )-----------( 133      ( 26 Dec 2024 03:20 )             44.3     12-26    146[H]  |  113[H]  |  28.0[H]  ----------------------------<  173[H]  4.4   |  21.0[L]  |  0.95    Ca    8.4      26 Dec 2024 03:20  Phos  2.5     12-26  Mg     2.6     12-26    TPro  5.7[L]  /  Alb  2.8[L]  /  TBili  0.3[L]  /  DBili  0.1  /  AST  18  /  ALT  17  /  AlkPhos  50  12-25      Urinalysis Basic - ( 26 Dec 2024 03:20 )    Color: x / Appearance: x / SG: x / pH: x  Gluc: 173 mg/dL / Ketone: x  / Bili: x / Urobili: x   Blood: x / Protein: x / Nitrite: x   Leuk Esterase: x / RBC: x / WBC x   Sq Epi: x / Non Sq Epi: x / Bacteria: x          CAPILLARY BLOOD GLUCOSE      POCT Blood Glucose.: 158 mg/dL (26 Dec 2024 17:23)  POCT Blood Glucose.: 169 mg/dL (26 Dec 2024 11:11)  POCT Blood Glucose.: 156 mg/dL (26 Dec 2024 05:12)  POCT Blood Glucose.: 136 mg/dL (25 Dec 2024 23:51)      Drug Levels: [] N/A  Valproic Acid Level, Serum: 26.1 ug/mL (12-25 @ 03:03)    CSF Analysis: [] N/A      Allergies    rocuronium (Rash (Severe))    Intolerances      MEDICATIONS:  Antibiotics:  piperacillin/tazobactam IVPB.. 3.375 Gram(s) IV Intermittent every 8 hours    Neuro:  chlordiazePOXIDE 50 milliGRAM(s) Oral every 6 hours  dexMEDEtomidine Infusion 0.2 MICROgram(s)/kG/Hr IV Continuous <Continuous>  fentaNYL    Injectable 50 MICROGram(s) IV Push every 2 hours PRN  LORazepam   Injectable 1 milliGRAM(s) IV Push every 2 hours PRN  ondansetron Injectable 4 milliGRAM(s) IV Push every 6 hours PRN  oxyCODONE    IR 5 milliGRAM(s) Oral every 8 hours  valproate sodium   IVPB 250 milliGRAM(s) IV Intermittent every 6 hours    Anticoagulation:  enoxaparin Injectable 40 milliGRAM(s) SubCutaneous <User Schedule>    OTHER:  atorvastatin 40 milliGRAM(s) Oral at bedtime  chlorhexidine 0.12% Liquid 15 milliLiter(s) Oral Mucosa every 12 hours  chlorhexidine 2% Cloths 1 Application(s) Topical daily  dextrose 50% Injectable 25 Gram(s) IV Push once  dextrose 50% Injectable 12.5 Gram(s) IV Push once  dextrose 50% Injectable 25 Gram(s) IV Push once  doxazosin 4 milliGRAM(s) Oral at bedtime  hydrALAZINE Injectable 10 milliGRAM(s) IV Push every 2 hours PRN  insulin lispro (ADMELOG) corrective regimen sliding scale   SubCutaneous every 6 hours  labetalol Injectable 10 milliGRAM(s) IV Push every 2 hours PRN  metoprolol tartrate 25 milliGRAM(s) Oral every 8 hours  pantoprazole  Injectable 40 milliGRAM(s) IV Push daily  polyethylene glycol 3350 17 Gram(s) Oral daily  senna 2 Tablet(s) Oral at bedtime    IVF:  dextrose 5%. 1000 milliLiter(s) IV Continuous <Continuous>  dextrose 5%. 1000 milliLiter(s) IV Continuous <Continuous>  folic acid Injectable 1 milliGRAM(s) IV Push daily    CULTURES:  Culture Results:   Commensal mayra consistent with body site (12-24 @ 02:11)  Culture Results:   No growth at 48 Hours (12-24 @ 02:05)    RADIOLOGY & ADDITIONAL TESTS:    PLAN  - Q4 neuro checks  - stat CTH for any changes in neuro exam  - MRI brain completed 12/25 with findings of associated postsurgical changes and small residual hematoma in the right cerebellar hemisphere  - HOB >30 degrees  - f/u eeg  - SBP goal 100-160, continue metoprolol and norvasc  - full ventilatory support  - Na goal 140-145  - zosyn for aspiration PNA  - DVT ppx: SCDs, SQL  Final plan to be discussed in AM rounds

## 2024-12-28 NOTE — PROGRESS NOTE ADULT - ASSESSMENT
69 yr old male with acute non-traumatic cerebellar ICH, with vasogenic edema, cerebellar compression; obstructive hydrocephalus.  S/p decompressive SOC 12/21/2024 and 12/27.    Patient is critically ill with high probability of imminent neurologic or life-threatening deterioration due to the following diagnoses:  1) Cerebellar Hemorrhage  2) Obstructive Hydrocephalus  - For which we are monitoring CSF output and adjusting shunt as necessary  3) Hypertensive Emergency  - For which we are continuously administering vasoactive medications with continuous measuring of blood pressure  4) Respiratory Failure  - For which we are treating with antibiotics and weaning ventilator settings with spontaneous breathing trials as tolerated    Plan:  - neurochecks  - minimize sedation  - Osats>92%, normocapnia; vent support, SBT as tolerated  - Hydralazine, Add Valsartan 160, consider Metoprolol -> Coreg  - -140 postop, avoid significant fluctuations; PRN meds, on Cardene gtt  - HR control - aim for <110;   - hold full AC in view of fresh ICH  - monitor e-lytes, I/Os, IVf; maintain Na 140-150, BMP daily; cont flomax  - cont TF, bowel regimen; PPI for ulcer ppx  - monitor FS, aim for 120-180, ISS  - switch back to empiric Ceftriaxone (7 days in total), send procal for d/c determination  - SCDs, hold AC    My full attention was spent providing medically necessary critical care to the patient with details documented in my note above.   Critical care time spent examining patient, reviewing vitals, labs, medications, imaging and discussing with the team goals of care   The combined critical care time provided to the patient was 60 minutes  This time does not include bedside procedures that are documented separately.           Yes...

## 2024-12-28 NOTE — PROGRESS NOTE ADULT - SUBJECTIVE AND OBJECTIVE BOX
69 yr old male history of htn, hld , on afib eliquis with prior ischemic stroke 2018, frequent utis, kidney stones, turp, cabg x 2, 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  went to [place], ich, vomited gcs 13 intubated.  Transferred to [facility] for possible SOC.  On arrival patient is sedated on versed, propofol and fent. (21 Dec 2024 16:13)    24hr EVENTS:   12/27 - Worsening exam, new R cerebellar ICH. sp repeat SOC    ROS: [x ]  Unable to assess due to mental status     PHYSICAL EXAM:  General: Calm, intubated, NAD  Neuro:  -Mental status- not FC, no EO, corneals +, dolls+, PERRL, cough present, o/b the vent;  LUE wdrl, RUE trace movements, BL LE spont.    CV: S1S2 present  Pulm: clear to auscultation BL  Abd: Soft, nontender, nondistended  Ext: no noted edema in lower ext  Skin: warm, dry    ------------------------------------------------------------------------------------------------------  ICU Vital Signs Last 24 Hrs  T(C): 37.5 (28 Dec 2024 13:30), Max: 37.6 (28 Dec 2024 00:15)  T(F): 99.5 (28 Dec 2024 13:30), Max: 99.7 (28 Dec 2024 00:15)  HR: 66 (28 Dec 2024 16:40) (60 - 83)  BP: 148/56 (28 Dec 2024 13:00) (115/60 - 148/66)  BP(mean): 81 (28 Dec 2024 13:00) (75 - 91)  ABP: 134/46 (28 Dec 2024 13:30) (119/84 - 153/52)  ABP(mean): 72 (28 Dec 2024 13:30) (71 - 115)  RR: 26 (28 Dec 2024 13:30) (13 - 34)  SpO2: 100% (28 Dec 2024 16:40) (98% - 100%)    O2 Parameters below as of 28 Dec 2024 12:00  Patient On (Oxygen Delivery Method): ventilator            I&O's Summary    27 Dec 2024 07:01  -  28 Dec 2024 07:00  --------------------------------------------------------  IN: 1367.5 mL / OUT: 1455 mL / NET: -87.5 mL    28 Dec 2024 07:01  -  28 Dec 2024 17:54  --------------------------------------------------------  IN: 1005 mL / OUT: 465 mL / NET: 540 mL        MEDICATIONS  (STANDING):  acetylcysteine 10%  Inhalation 4 milliLiter(s) Inhalation every 6 hours  albuterol    0.083% 2.5 milliGRAM(s) Nebulizer every 6 hours  amLODIPine   Tablet 10 milliGRAM(s) Oral daily  atorvastatin 40 milliGRAM(s) Oral at bedtime  cefTRIAXone Injectable. 1000 milliGRAM(s) IV Push every 24 hours  chlorhexidine 0.12% Liquid 15 milliLiter(s) Oral Mucosa every 12 hours  chlorhexidine 2% Cloths 1 Application(s) Topical daily  doxazosin 4 milliGRAM(s) Oral at bedtime  folic acid 1 milliGRAM(s) Oral daily  hydrALAZINE 50 milliGRAM(s) Oral every 8 hours  insulin lispro (ADMELOG) corrective regimen sliding scale   SubCutaneous every 6 hours  metoprolol tartrate 25 milliGRAM(s) Oral every 8 hours  niCARdipine Infusion 5 mG/Hr (25 mL/Hr) IV Continuous <Continuous>  pantoprazole   Suspension 40 milliGRAM(s) Oral daily  polyethylene glycol 3350 17 Gram(s) Oral daily  senna 2 Tablet(s) Oral at bedtime      RESPIRATORY:  Mode: CPAP with PS  FiO2: 40  PEEP: 6  PS: 10  ITime: 1  MAP: 10  PIP: 19      IMAGING:   Recent imaging studies were reviewed.    LAB RESULTS:                          14.0   10.21 )-----------( 186      ( 28 Dec 2024 03:38 )             43.8       PT/INR - ( 27 Dec 2024 11:30 )   PT: 13.2 sec;   INR: 1.14 ratio         PTT - ( 27 Dec 2024 11:30 )  PTT:28.9 sec    12-28    151[H]  |  116[H]  |  34.3[H]  ----------------------------<  205[H]  4.8   |  22.0  |  1.18    Ca    8.4      28 Dec 2024 03:38  Phos  3.1     12-28  Mg     2.8     12-28    TPro  5.6[L]  /  Alb  2.7[L]  /  TBili  0.3[L]  /  DBili  x   /  AST  33  /  ALT  56[H]  /  AlkPhos  52  12-27

## 2024-12-28 NOTE — PROGRESS NOTE ADULT - SUBJECTIVE AND OBJECTIVE BOX
HPI:  HPI:   69 yr old male history of htn, hld , on afib eliquis with prior ischemic stroke 2018, frequent utis, kidney stones, turp, cabg x 2, 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  went to [place], ich, vomited gcs 13 intubated.  Transferred to [facility] for possible SOC.  On arrival patient is sedated on versed, propofol and fent.    MRS 0  ICH score 3  NIHSS 25 (21 Dec 2024 16:13)    Overnight events:    Patient seen and examined at bedside. Patient s/p repeat SOC for heme evacuation. Neurological exam has progressed to no motor movement to noxius stimuli in all extremities. CT head reviewed with Neurosurgeon.    Vital Signs Last 24 Hrs  T(C): 37.6 (28 Dec 2024 00:30), Max: 37.6 (28 Dec 2024 00:15)  T(F): 99.7 (28 Dec 2024 00:30), Max: 99.7 (28 Dec 2024 00:15)  HR: 72 (28 Dec 2024 00:30) (59 - 74)  BP: 133/63 (28 Dec 2024 00:30) (115/60 - 171/63)  BP(mean): 82 (28 Dec 2024 00:30) (75 - 100)  RR: 22 (28 Dec 2024 00:30) (11 - 24)  SpO2: 100% (28 Dec 2024 00:30) (98% - 100%)  Parameters below as of 28 Dec 2024 00:00  Patient On (Oxygen Delivery Method): ventilator      PHYSICAL EXAM:   General: NAD, intubated   Cardiovascular: RRR, normal S1 and S2    Neuro: Not OE, not FC   CNII-CXII: PERRL2 mm briskly reactive, unable to assess EOM, +cough/gag/corneals   Motor: no movement to noxious stimuli in all extremities.    Wound/incision: SOC incision site C/D/I           LABS:                        13.8   9.36  )-----------( 153      ( 27 Dec 2024 11:30 )             43.4     12-27    148[H]  |  114[H]  |  30.2[H]  ----------------------------<  191[H]  4.1   |  25.0  |  1.07    Ca    8.4      27 Dec 2024 11:30  Phos  3.0     12-27  Mg     2.7     12-27    TPro  5.6[L]  /  Alb  2.7[L]  /  TBili  0.3[L]  /  DBili  x   /  AST  33  /  ALT  56[H]  /  AlkPhos  52  12-27    PT/INR - ( 27 Dec 2024 11:30 )   PT: 13.2 sec;   INR: 1.14 ratio         PTT - ( 27 Dec 2024 11:30 )  PTT:28.9 sec  Urinalysis Basic - ( 27 Dec 2024 11:30 )    Color: x / Appearance: x / SG: x / pH: x  Gluc: 191 mg/dL / Ketone: x  / Bili: x / Urobili: x   Blood: x / Protein: x / Nitrite: x   Leuk Esterase: x / RBC: x / WBC x   Sq Epi: x / Non Sq Epi: x / Bacteria: x        12-26 @ 07:01 - 12-27 @ 07:00  --------------------------------------------------------  IN: 2273.6 mL / OUT: 1695 mL / NET: 578.6 mL    12-27 @ 07:01  - 12-28 @ 01:00  --------------------------------------------------------  IN: 1037.5 mL / OUT: 1005 mL / NET: 32.5 mL        RADIOLOGY & ADDITIONAL TESTS:     CT Head No Cont (12.27.24 @ 17:20)   IMPRESSION:  Status post evacuation of previously seen hemorrhage in the right   cerebellar hemisphere.  Small amount of residual hemorrhage in the   midline vermian region is stable.  Persistent mild edema and mass effect   and cerebellum.    Foci of postoperative pneumocephalus as discussed above.  A postoperative   extradural collection offluid and gas in the craniectomy defect measures   approximately 3 x 6 cm.    Stable intraventricular hemorrhage.  Slight prominence of the ventricles   is stable.    Grossly stable low-attenuation frontal subdural collections bilaterally.

## 2024-12-28 NOTE — PROGRESS NOTE ADULT - ASSESSMENT
69M PMH HTN, HLD, A fib on Eliquis, Ischemic stroke 2018, TURP, CAD, CABG x2, kidney stones, ETOH, presented to [facility] with dizziness, found to have cerebellar ICH. S/p decompressive SOC 12/21/2024 and 12/27.    PLAN   - Q1 neuro checks   - stat CTH for any changes in neuro exam   - CT head in AM 12/28    - MRI brain completed 12/25 with findings of associated postsurgical changes and small residual hematoma in the right cerebellar hemisphere   - HOB >30 degrees   - SBP goal 100-160, continue metoprolol and norvasc   - full ventilatory support   - Na goal 140-145   - zosyn for aspiration PNA   - consider consulting palliative    - DVT ppx: SCDs, SQL

## 2024-12-29 NOTE — PROVIDER CONTACT NOTE (EICU) - BACKGROUND
69 year old male with acute non-traumatic cerebellar ICH, with vasogenic edema, cerebellar compression; obstructive hydrocephalus. S/p decompressive SOC x 2

## 2024-12-29 NOTE — PROGRESS NOTE ADULT - SUBJECTIVE AND OBJECTIVE BOX
69 yr old male history of htn, hld , on afib eliquis with prior ischemic stroke 2018, frequent utis, kidney stones, turp, cabg x 2, 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  went to [place], ich, vomited gcs 13 intubated.  Transferred to [facility] for possible SOC.  On arrival patient is sedated on versed, propofol and fent. (21 Dec 2024 16:13)    24hr EVENTS:   12/27 - Worsening exam, new R cerebellar ICH. sp repeat SOC  12/28 - Worsening SpO2,  CXR    ROS: [x ]  Unable to assess due to mental status     PHYSICAL EXAM:  General: Calm, intubated, NAD  Neuro:  -Mental status- not FC, no EO, corneals +, dolls+, PERRL, cough present, o/b the vent;  LUE wdrl, RUE trace movements, BL LE trace withdrawal.    CV: S1S2 present  Pulm: clear to auscultation BL  Abd: Soft, nontender, nondistended  Ext: no noted edema in lower ext  Skin: warm, dry    ------------------------------------------------------------------------------------------------------  ICU Vital Signs Last 24 Hrs  T(C): 37.7 (29 Dec 2024 12:15), Max: 38 (28 Dec 2024 22:00)  T(F): 99.9 (29 Dec 2024 12:15), Max: 100.4 (28 Dec 2024 22:00)  HR: 68 (29 Dec 2024 12:19) (59 - 83)  BP: 139/56 (29 Dec 2024 12:15) (126/51 - 149/58)  BP(mean): 79 (29 Dec 2024 12:15) (73 - 87)  ABP: 126/68 (29 Dec 2024 12:15) (114/42 - 163/61)  ABP(mean): 82 (29 Dec 2024 12:15) (62 - 106)  RR: 27 (29 Dec 2024 12:15) (16 - 27)  SpO2: 92% (29 Dec 2024 12:19) (91% - 100%)    O2 Parameters below as of 29 Dec 2024 12:00  Patient On (Oxygen Delivery Method): ventilator    O2 Concentration (%): 50        I&O's Summary    28 Dec 2024 07:01  -  29 Dec 2024 07:00  --------------------------------------------------------  IN: 3107.5 mL / OUT: 2215 mL / NET: 892.5 mL    29 Dec 2024 07:01  -  29 Dec 2024 12:31  --------------------------------------------------------  IN: 822.5 mL / OUT: 400 mL / NET: 422.5 mL        MEDICATIONS  (STANDING):  acetaminophen   IVPB .. 1000 milliGRAM(s) IV Intermittent once  acetylcysteine 10%  Inhalation 4 milliLiter(s) Inhalation every 6 hours  albuterol    0.083% 2.5 milliGRAM(s) Nebulizer every 6 hours  amLODIPine   Tablet 10 milliGRAM(s) Oral daily  atorvastatin 40 milliGRAM(s) Oral at bedtime  carvedilol 25 milliGRAM(s) Oral every 12 hours  chlorhexidine 0.12% Liquid 15 milliLiter(s) Oral Mucosa every 12 hours  chlorhexidine 2% Cloths 1 Application(s) Topical daily  doxazosin 4 milliGRAM(s) Oral at bedtime  folic acid 1 milliGRAM(s) Oral daily  furosemide   Injectable 40 milliGRAM(s) IV Push once  hydrALAZINE 100 milliGRAM(s) Oral every 8 hours  insulin lispro (ADMELOG) corrective regimen sliding scale   SubCutaneous every 6 hours  multivitamin 1 Tablet(s) Oral daily  niCARdipine Infusion 5 mG/Hr (25 mL/Hr) IV Continuous <Continuous>  pantoprazole   Suspension 40 milliGRAM(s) Oral daily  piperacillin/tazobactam IVPB. 3.375 Gram(s) IV Intermittent once  piperacillin/tazobactam IVPB.- 3.375 Gram(s) IV Intermittent once  piperacillin/tazobactam IVPB.. 3.375 Gram(s) IV Intermittent every 8 hours  polyethylene glycol 3350 17 Gram(s) Oral daily  senna 2 Tablet(s) Oral at bedtime  valsartan 160 milliGRAM(s) Oral daily      RESPIRATORY:  Mode: AC/ CMV (Assist Control/ Continuous Mandatory Ventilation)  RR (machine): 18  TV (machine): 500  FiO2: 50  PEEP: 6  ITime: 1  MAP: 10  PIP: 17      IMAGING:   Recent imaging studies were reviewed.    LAB RESULTS:                          13.8   10.31 )-----------( 194      ( 29 Dec 2024 02:45 )             44.0           12-29    154[H]  |  120[H]  |  37.8[H]  ----------------------------<  230[H]  4.6   |  23.0  |  1.07    Ca    8.4      29 Dec 2024 02:45  Phos  2.5     12-29  Mg     2.8     12-29            ABG - ( 29 Dec 2024 10:35 )  pH, Arterial: 7.490 pH, Blood: x     /  pCO2: 36    /  pO2: 70    / HCO3: 27    / Base Excess: 4.1   /  SaO2: 96.9

## 2024-12-29 NOTE — PROGRESS NOTE ADULT - SUBJECTIVE AND OBJECTIVE BOX
HPI:  HPI:   69 yr old male history of htn, hld , on afib eliquis with prior ischemic stroke 2018, frequent utis, kidney stones, turp, cabg x 2, 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  went to [place], ich, vomited gcs 13 intubated.  Transferred to [facility] for possible SOC.  On arrival patient is sedated on versed, propofol and fent.    MRS 0  ICH score 3  NIHSS 25 (21 Dec 2024 16:13)    INTERVAL HPI/OVERNIGHT EVENTS:  Patient seen and examined at bedside. Patient has no cough/gag reflex. . Poor neurological exam        Vital Signs Last 24 Hrs  T(C): 38 (28 Dec 2024 22:30), Max: 38 (28 Dec 2024 22:00)  T(F): 100.4 (28 Dec 2024 22:30), Max: 100.4 (28 Dec 2024 22:00)  HR: 66 (29 Dec 2024 00:01) (62 - 83)  BP: 149/58 (28 Dec 2024 18:00) (133/61 - 149/58)  BP(mean): 82 (28 Dec 2024 18:00) (80 - 91)  RR: 18 (28 Dec 2024 22:30) (13 - 34)  SpO2: 100% (29 Dec 2024 00:01) (98% - 100%)    Parameters below as of 28 Dec 2024 20:15  Patient On (Oxygen Delivery Method): ventilator, 40%         PHYSICAL EXAM:     General: NAD, intubated     Cardiovascular: RRR, normal S1 and S2      Neuro: Not OE, not FC     CNII-CXII: PERRL2 mm briskly reactive, unable to assess EOM, -cough/gag/+ corneals     Motor: no movement to noxious stimuli in all extremities.      Wound/incision: SOC incision site C/D/I        LABS:                        14.0   10.21 )-----------( 186      ( 28 Dec 2024 03:38 )             43.8     12-28    151[H]  |  116[H]  |  34.3[H]  ----------------------------<  205[H]  4.8   |  22.0  |  1.18    Ca    8.4      28 Dec 2024 03:38  Phos  3.1     12-28  Mg     2.8     12-28    TPro  5.6[L]  /  Alb  2.7[L]  /  TBili  0.3[L]  /  DBili  x   /  AST  33  /  ALT  56[H]  /  AlkPhos  52  12-27    PT/INR - ( 27 Dec 2024 11:30 )   PT: 13.2 sec;   INR: 1.14 ratio         PTT - ( 27 Dec 2024 11:30 )  PTT:28.9 sec  Urinalysis Basic - ( 28 Dec 2024 03:38 )    Color: x / Appearance: x / SG: x / pH: x  Gluc: 205 mg/dL / Ketone: x  / Bili: x / Urobili: x   Blood: x / Protein: x / Nitrite: x   Leuk Esterase: x / RBC: x / WBC x   Sq Epi: x / Non Sq Epi: x / Bacteria: x        12-27 @ 07:01 - 12-28 @ 07:00  --------------------------------------------------------  IN: 1367.5 mL / OUT: 1455 mL / NET: -87.5 mL    12-28 @ 07:01 - 12-29 @ 00:26  --------------------------------------------------------  IN: 2025 mL / OUT: 955 mL / NET: 1070 mL

## 2024-12-29 NOTE — PROGRESS NOTE ADULT - ASSESSMENT
69 yr old male with acute non-traumatic cerebellar ICH, with vasogenic edema, cerebellar compression; obstructive hydrocephalus.  S/p decompressive SOC 12/21/2024 and 12/27.    Patient is critically ill with high probability of imminent neurologic or life-threatening deterioration due to the following diagnoses:  1) Cerebellar Hemorrhage  2) Obstructive Hydrocephalus  - For which we are monitoring CSF output and adjusting shunt as necessary  3) Hypertensive Emergency  - For which we are continuously administering vasoactive medications with continuous measuring of blood pressure  4) Respiratory Failure  - For which we are treating with antibiotics and weaning ventilator settings with spontaneous breathing trials as tolerated    Plan:  - neurochecks  - minimize sedation  - Given encephalopathy out of proportion to injury, will consider MRI +/- neurostimulants tomorrow  - Osats>92%, normocapnia; vent support, SBT as tolerated  - Volume Goal -500cc to -1L  - Hydralazine, Valsartan 160, Coreg  - -140 postop, avoid significant fluctuations; PRN meds, on Cardene gtt  - HR control - aim for <110;   - hold full AC in view of fresh ICH  - monitor e-lytes, I/Os, IVf; maintain Na 140-150, BMP daily; cont flomax  - cont TF, bowel regimen; PPI for ulcer ppx  - monitor FS, aim for 120-180, ISS  - switch back to empiric Ceftriaxone (7 days in total), send procal for d/c determination  - SCDs, hold AC    My full attention was spent providing medically necessary critical care to the patient with details documented in my note above.   Critical care time spent examining patient, reviewing vitals, labs, medications, imaging and discussing with the team goals of care   The combined critical care time provided to the patient was 60 minutes  This time does not include bedside procedures that are documented separately.           69 yr old male with acute non-traumatic cerebellar ICH, with vasogenic edema, cerebellar compression; obstructive hydrocephalus.  S/p decompressive SOC 12/21/2024 and 12/27.    Patient is critically ill with high probability of imminent neurologic or life-threatening deterioration due to the following diagnoses:  1) Cerebellar Hemorrhage  2) Obstructive Hydrocephalus  - For which we are monitoring CSF output and adjusting shunt as necessary  3) Hypertensive Emergency  - For which we are continuously administering vasoactive medications with continuous measuring of blood pressure  4) Respiratory Failure  - For which we are treating with antibiotics and weaning ventilator settings with spontaneous breathing trials as tolerated    Plan:  - neurochecks  - minimize sedation  - Given encephalopathy out of proportion to injury, will consider MRI +/- neurostimulants tomorrow  - Osats>92%, normocapnia; vent support, SBT as tolerated  - Volume Goal -500cc to -1L  - Hydralazine, Valsartan 160, Coreg  - -140 postop, avoid significant fluctuations; PRN meds, on Cardene gtt  - HR control - aim for <110;   - hold full AC in view of fresh ICH  - monitor e-lytes, I/Os, IVf; maintain Na 140-150, BMP daily; cont flomax  - cont TF, bowel regimen; PPI for ulcer ppx  - monitor FS, aim for 120-180, ISS  - Ceftriaxone -> Zosyn given worsening clinical and radiographic pulmonary function  - SCDs, hold AC    My full attention was spent providing medically necessary critical care to the patient with details documented in my note above.   Critical care time spent examining patient, reviewing vitals, labs, medications, imaging and discussing with the team goals of care   The combined critical care time provided to the patient was 60 minutes  This time does not include bedside procedures that are documented separately.

## 2024-12-29 NOTE — PROGRESS NOTE ADULT - ASSESSMENT
69M PMH HTN, HLD, A fib on Eliquis, Ischemic stroke 2018, TURP, CAD, CABG x2, kidney stones, ETOH, presented to [facility] with dizziness, found to have cerebellar ICH. S/p decompressive SOC 12/21/2024 and 12/27.  PLAN   - Q1 neuro checks   - ongoing goals of care, palliative consulted.   - MRI brain completed 12/25 with findings of associated postsurgical changes and small residual hematoma in the right cerebellar hemisphere   - HOB >30 degrees   - SBP goal 100-160, continue metoprolol and norvasc   - full ventilatory support   - Na goal 140-145   -Rocephin  for aspiration PNA     - DVT ppx: SCDs, SQL

## 2024-12-30 NOTE — PROGRESS NOTE ADULT - ASSESSMENT
69 yr old male with acute non-traumatic cerebellar ICH, with vasogenic edema, cerebellar compression; obstructive hydrocephalus.  S/p decompressive SOC 12/21/2024 and 12/27.    Patient is critically ill with high probability of imminent neurologic or life-threatening deterioration due to the following diagnoses:  1) Cerebellar Hemorrhage  2) Obstructive Hydrocephalus  - For which we are monitoring CSF output and adjusting shunt as necessary  3) Hypertensive Emergency  - For which we are continuously administering vasoactive medications with continuous measuring of blood pressure  4) Respiratory Failure  - For which we are treating with antibiotics and weaning ventilator settings with spontaneous breathing trials as tolerated    Plan:  - neurochecks  - minimize sedation  - Encephalopathy out of proportion to injury, possibly metabolic, will repeat MRI   - Osats>92%, normocapnia; vent support  - Volume Goal -500cc to -1L  - Hydralazine, Valsartan 160, Coreg  - Liberalize -160 postop, avoid significant fluctuations; PRN meds, titrate off Cardene gtt  - HR control - aim for <110;   - hold full AC in view of fresh ICH  - monitor e-lytes, I/Os, IVf; maintain Na 140-150, BMP daily; cont flomax  - cont TF, bowel regimen; PPI for ulcer ppx  - monitor FS, aim for 120-180, ISS  - Continue Zosyn given worsening clinical and radiographic pulmonary function  - SCDs, start SQL    My full attention was spent providing medically necessary critical care to the patient with details documented in my note above.   Critical care time spent examining patient, reviewing vitals, labs, medications, imaging and discussing with the team goals of care   The combined critical care time provided to the patient was 60 minutes  This time does not include bedside procedures that are documented separately.

## 2024-12-30 NOTE — CONSULT NOTE ADULT - CONVERSATION DETAILS
Met with patient at bedside, intubated  Discussed case with Neuro team - consulted for emotional support for patient with poor prognosis  Plan to reach out to patient's wife for ongoing goals of care  Palliative to follow  Update: Spoke to patient's wife, Anushka, provided support. Anushka stated today she will be home today to rest but her adult children will be there to visit her . At this time goals within plan to continue to medically optimize. Family is hopeful with time he will be able to show some improvement with wakefulness but she is prepared that it may not. Anushka also acknowledges that she may have to make further difficult decisions as his course progresses. Offered support through out chaplaincy/spiritual support at [facility], but wife declined at this time. Palliative to follow for ongoing GOC.  Surrogate/HCP/Guardian: Phone#: Anushka Hurtado [phone]

## 2024-12-30 NOTE — CONSULT NOTE ADULT - ASSESSMENT
69 year old male PMHx afib (on eliquis with prior ischemic stroke 2018), HTN, CABG x 2, ETOH use transferred from [place] for ICH. Patient admitted to NEURO ICU for monitoring, Palliative consulted for support as patient condition not improving, no gag/cough reflex and poor neuro exam.      Problem/Recommendation 1:ICH  NEURO ICU management  Noted CT Head 12.27.24/12.28.24  c/w Neuro checks  repeat MRI?     Problem/Recommendation 2:AMS/Delirium  Consider trying to avoid/minimize deliriogenic drugs such as benzodiazepines and anticholinergics   Non pharmacologic options for delirium: Frequently orient patient, identify self, maintain consistent staffing and location   Limit stimulation, soft voice, soft lighting, gentle handling  Sleep disturbance support  Provide adequate sensory aides such as hearing aids, glasses, calendar, clock  Soft warm blankets  Bed alarm for safety    Problem/Recommendation 3: Debility  Assist in ADLS  Maintain safety, fall, aspiration precautions  Set bed alarm, chair alarm for safety and fall prevention  Turn and Position in bed     Problem/Recommendation 4: Palliative Care Encounter  Met with patient at bedside, intubated  Discussed case with Neuro team - consulted for emotional support for patient with poor prognosis  Plan to reach out to patient's wife for ongoing goals of care  Palliative to follow  Update: Spoke to patient's wife, Anushka, provided support. Anushka stated today she will be home today to rest but her adult children will be there to visit her . At this time goals within plan to continue to medically optimize. Family is hopeful with time he will be able to show some improvement with wakefulness but she is prepared that it may not. Anushka also acknowledges that she may have to make further difficult decisions as his course progresses. Offered support through out chaplaincy/spiritual support at [facility], but wife declined at this time. Palliative to follow for ongoing GOC.     Surrogate/HCP/Guardian: Phone#: Anushka Hurtado [phone]    Total Time Spent__55_ minutes  Total time also includes discussion during interdisciplinary team rounds, chart review including but limited to prior admissions/   review of medications/ labs/ imaging, examination, care coordination with other health care professionals, documentation EXCLUDING advance care planning discussions.   18 minutes in advance care planning, GOC.    Thank you for the opportunity to assist with the care of this patient.   [facility] Palliative Medicine Consult Service [phone].

## 2024-12-30 NOTE — CONSULT NOTE ADULT - SUBJECTIVE AND OBJECTIVE BOX
Palliative Care Consult   69 year old male PMHx afib (on eliquis with prior ischemic stroke 2018), HTN, CABG x 2, ETOH use transferred from [place] for ICH. Patient admitted to NEURO ICU for monitoring, Palliative consulted for support as patient condition not improving, no gag/cough reflex and poor neuro exam.      <HPI:   69 yr old male history of htn, hld , on afib eliquis with prior ischemic stroke 2018, frequent utis, kidney stones, turp, cabg x 2, 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  went to [place], ich, vomited gcs 13 intubated.  Transferred to [facility] for possible SOC.  On arrival patient is sedated on versed, propofol and fent.  MRS 0  ICH score 3  NIHSS 25 (21 Dec 2024 16:13)>end of copied text      PERTINENT PMH REVIEWED: Yes     PAST MEDICAL & SURGICAL HISTORY:  Calculus of kidney      Hypertension      Hyperlipidemia      Stroke      History of hip replacement, total, left          SOCIAL HISTORY:                      Substance history: hx ETOH                    Admitted from:  home                    Voodoo/spirituality:presbyterian                    Cultural concerns:none                      Surrogate/HCP/Guardian: Phone#: Anushka Hurtado [phone]    FAMILY HISTORY:  Family history of kidney stones (Father)  No family history related to admission diagonsis    Allergies  rocuronium (Rash (Severe))    ADVANCE DIRECTIVES/TREATMENT PREFERENCES:  Full Code    Baseline ADLs (prior to admission):  Independent    Karnofsky/Palliative Performance Status Version 2:  %10  http://npcrc.org/files/news/palliative_performance_scale_[email or web address].pdf    Present Symptoms:   non verbal cues of dyspnea/pain absent    Pain AD Score:.0  [email or web address]/[email or web address] (press ctrl + left click to view)    Review of Systems: Reviewed  Unable to obtain due to poor mentation   All others negative    MEDICATIONS  (STANDING):  acetaminophen   IVPB .. 1000 milliGRAM(s) IV Intermittent once  amLODIPine   Tablet 10 milliGRAM(s) Oral daily  atorvastatin 40 milliGRAM(s) Oral at bedtime  carvedilol 25 milliGRAM(s) Oral every 12 hours  chlorhexidine 0.12% Liquid 15 milliLiter(s) Oral Mucosa every 12 hours  chlorhexidine 2% Cloths 1 Application(s) Topical daily  doxazosin 4 milliGRAM(s) Oral at bedtime  enoxaparin Injectable 40 milliGRAM(s) SubCutaneous <User Schedule>  folic acid 1 milliGRAM(s) Oral daily  hydrALAZINE 100 milliGRAM(s) Oral every 8 hours  insulin lispro (ADMELOG) corrective regimen sliding scale   SubCutaneous every 6 hours  multivitamin 1 Tablet(s) Oral daily  niCARdipine Infusion 5 mG/Hr (25 mL/Hr) IV Continuous <Continuous>  pantoprazole   Suspension 40 milliGRAM(s) Oral daily  piperacillin/tazobactam IVPB.. 3.375 Gram(s) IV Intermittent every 8 hours  polyethylene glycol 3350 17 Gram(s) Oral daily  senna 2 Tablet(s) Oral at bedtime  valsartan 160 milliGRAM(s) Oral daily    MEDICATIONS  (PRN):  hydrALAZINE Injectable 10 milliGRAM(s) IV Push every 2 hours PRN SBP>160  labetalol Injectable 10 milliGRAM(s) IV Push every 2 hours PRN SBP>160  ondansetron Injectable 4 milliGRAM(s) IV Push every 6 hours PRN Nausea and/or Vomiting      PHYSICAL EXAM:    Vital Signs Last 24 Hrs  T(C): 37.5 (30 Dec 2024 12:00), Max: 38.7 (29 Dec 2024 16:15)  T(F): 99.5 (30 Dec 2024 12:00), Max: 101.7 (29 Dec 2024 16:15)  HR: 73 (30 Dec 2024 12:06) (63 - 84)  BP: 144/62 (30 Dec 2024 12:00) (104/49 - 164/68)  BP(mean): 83 (30 Dec 2024 12:00) (63 - 96)  RR: 22 (30 Dec 2024 12:00) (17 - 25)  SpO2: 97% (30 Dec 2024 12:06) (87% - 99%)    Parameters below as of 30 Dec 2024 12:00  Patient On (Oxygen Delivery Method): ventilator    O2 Concentration (%): 50    General: intubated    HEENT: dry mouth     Lungs: comfortable    CV: normal      GI: incontinent    : alvares    MSK: weakness, bedbound    Neuro: cognitive impairment     Skin:  no rash    LABS:                        13.5   12.49 )-----------( 202      ( 30 Dec 2024 04:49 )             43.4     12-30    150[H]  |  116[H]  |  42.6[H]  ----------------------------<  183[H]  4.7   |  22.0  |  1.27    Ca    8.3[L]      30 Dec 2024 10:00  Phos  4.2     12-30  Mg     2.6     12-30      Urinalysis Basic - ( 30 Dec 2024 10:00 )    Color: x / Appearance: x / SG: x / pH: x  Gluc: 183 mg/dL / Ketone: x  / Bili: x / Urobili: x   Blood: x / Protein: x / Nitrite: x   Leuk Esterase: x / RBC: x / WBC x   Sq Epi: x / Non Sq Epi: x / Bacteria: x      I&O's Summary    29 Dec 2024 07:01  -  30 Dec 2024 07:00  --------------------------------------------------------  IN: 3317.5 mL / OUT: 2605 mL / NET: 712.5 mL    30 Dec 2024 07:01  -  30 Dec 2024 13:22  --------------------------------------------------------  IN: 525 mL / OUT: 1400 mL / NET: -875 mL        RADIOLOGY & ADDITIONAL STUDIES:    CT head 12.27.24    IMPRESSION:  Status post evacuation of previously seen hemorrhage in the right   cerebellar hemisphere.  Small amount of residual hemorrhage in the   midline vermian region is stable.  Persistent mild edema and mass effect   and cerebellum.    Foci of postoperative pneumocephalus as discussed above.  A postoperative   extradural collection offluid and gas in the craniectomy defect measures   approximately 3 x 6 cm.    Stable intraventricular hemorrhage.  Slight prominence of the ventricles   is stable.    Grossly stable low-attenuation frontal subdural collections bilaterally.

## 2024-12-30 NOTE — PROGRESS NOTE ADULT - ASSESSMENT
69M PMH HTN, HLD, A fib on Eliquis, Ischemic stroke 2018, TURP, CAD, CABG x2, kidney stones, ETOH, presented to [facility] with dizziness, found to have cerebellar ICH. S/p decompressive SOC 12/21/2024 and 12/27.    PLAN     - Q2 coma checks.      -repeat MRI for prognostication/GOC     - HOB >30 degrees     - SBP goal 100-160, continue HTN medications     - full ventilatory support     - Na goal 140-145     -zosyn for aspiration PNA     - Palliative consulted      - DVT ppx: SCDs, SQL

## 2024-12-30 NOTE — PROGRESS NOTE ADULT - SUBJECTIVE AND OBJECTIVE BOX
HPI:  HPI:   69 yr old male history of htn, hld , on afib eliquis with prior ischemic stroke 2018, frequent utis, kidney stones, turp, cabg x 2, 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  went to [place], ich, vomited gcs 13 intubated.  Transferred to [facility] for possible SOC.  On arrival patient is sedated on versed, propofol and fent.    MRS 0  ICH score 3  NIHSS 25 (21 Dec 2024 16:13)        INTERVAL HPI/OVERNIGHT EVENTS:  Patient seen and examined at bedside. Patient has no cough/gag reflex. . Poor neurological exam       Vital Signs Last 24 Hrs  T(C): 37.4 (30 Dec 2024 00:00), Max: 38.7 (29 Dec 2024 16:15)  T(F): 99.3 (30 Dec 2024 00:00), Max: 101.7 (29 Dec 2024 16:15)  HR: 80 (30 Dec 2024 00:00) (59 - 80)  BP: 132/59 (30 Dec 2024 00:00) (104/49 - 148/67)  BP(mean): 81 (30 Dec 2024 00:00) (63 - 88)  RR: 19 (30 Dec 2024 00:00) (16 - 27)  SpO2: 93% (30 Dec 2024 00:00) (90% - 100%)    Parameters below as of 30 Dec 2024 00:00  Patient On (Oxygen Delivery Method): ventilator    O2 Concentration (%): 50      LABS:                        13.8   10.31 )-----------( 194      ( 29 Dec 2024 02:45 )             44.0     12-29    151[H]  |  116[H]  |  43.3[H]  ----------------------------<  216[H]  4.5   |  25.0  |  1.28    Ca    8.2[L]      29 Dec 2024 22:00  Phos  2.5     12-29  Mg     2.8     12-29         PHYSICAL EXAM:   General: NAD, intubated   Cardiovascular: RRR, normal S1 and S2    Neuro: Not OE, not FC   CNII-CXII: PERRL2 mm briskly reactive, unable to assess EOM, -cough/gag/+ corneals   Motor: no movement to noxious stimuli in all extremities.    Wound/incision: SOC incision site C/D/I     Urinalysis Basic - ( 29 Dec 2024 22:00 )    Color: x / Appearance: x / SG: x / pH: x  Gluc: 216 mg/dL / Ketone: x  / Bili: x / Urobili: x   Blood: x / Protein: x / Nitrite: x   Leuk Esterase: x / RBC: x / WBC x   Sq Epi: x / Non Sq Epi: x / Bacteria: x        12-28 @ 07:01  -  12-29 @ 07:00  --------------------------------------------------------  IN: 3107.5 mL / OUT: 2215 mL / NET: 892.5 mL    12-29 @ 07:01 - 12-30 @ 00:13  --------------------------------------------------------  IN: 2687.5 mL / OUT: 1930 mL / NET: 757.5 mL

## 2024-12-30 NOTE — PROGRESS NOTE ADULT - SUBJECTIVE AND OBJECTIVE BOX
69 yr old male history of htn, hld , on afib eliquis with prior ischemic stroke 2018, frequent utis, kidney stones, turp, cabg x 2, 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  went to [facility], ich, vomited gcs 13 intubated.  Transferred to [facility] for possible SOC.  On arrival patient is sedated on versed, propofol and fent. (21 Dec 2024 16:13)    24hr EVENTS:   12/27 - Worsening exam, new R cerebellar ICH. sp repeat SOC  12/28 - Worsening SpO2 & CXR      ROS: [x ]  Unable to assess due to mental status     PHYSICAL EXAM:  General: Calm, intubated, NAD  Neuro:  -Mental status- not FC, no EO, corneals +, dolls+, PERRL, cough present, o/b the vent;  LUE wdrl, RUE trace movements, BL LE withdrawal.    CV: S1S2 present  Pulm: clear to auscultation BL  Abd: Soft, nontender, nondistended  Ext: no noted edema in lower ext  Skin: warm, dry    ------------------------------------------------------------------------------------------------------  ICU Vital Signs Last 24 Hrs  T(C): 37.7 (30 Dec 2024 13:00), Max: 38.7 (29 Dec 2024 16:15)  T(F): 99.9 (30 Dec 2024 13:00), Max: 101.7 (29 Dec 2024 16:15)  HR: 74 (30 Dec 2024 13:00) (63 - 84)  BP: 145/59 (30 Dec 2024 13:00) (104/49 - 164/68)  BP(mean): 82 (30 Dec 2024 13:00) (63 - 96)  ABP: 143/50 (30 Dec 2024 13:00) (96/46 - 165/59)  ABP(mean): 74 (30 Dec 2024 13:00) (58 - 104)  RR: 20 (30 Dec 2024 13:00) (17 - 25)  SpO2: 97% (30 Dec 2024 13:00) (87% - 99%)    O2 Parameters below as of 30 Dec 2024 13:00  Patient On (Oxygen Delivery Method): ventilator    O2 Concentration (%): 50        I&O's Summary    29 Dec 2024 07:01  -  30 Dec 2024 07:00  --------------------------------------------------------  IN: 3317.5 mL / OUT: 2605 mL / NET: 712.5 mL    30 Dec 2024 07:01  -  30 Dec 2024 14:15  --------------------------------------------------------  IN: 525 mL / OUT: 1400 mL / NET: -875 mL        MEDICATIONS  (STANDING):  acetaminophen   IVPB .. 1000 milliGRAM(s) IV Intermittent once  amLODIPine   Tablet 10 milliGRAM(s) Oral daily  atorvastatin 40 milliGRAM(s) Oral at bedtime  carvedilol 25 milliGRAM(s) Oral every 12 hours  chlorhexidine 0.12% Liquid 15 milliLiter(s) Oral Mucosa every 12 hours  chlorhexidine 2% Cloths 1 Application(s) Topical daily  doxazosin 4 milliGRAM(s) Oral at bedtime  enoxaparin Injectable 40 milliGRAM(s) SubCutaneous <User Schedule>  folic acid 1 milliGRAM(s) Oral daily  hydrALAZINE 100 milliGRAM(s) Oral every 8 hours  insulin lispro (ADMELOG) corrective regimen sliding scale   SubCutaneous every 6 hours  multivitamin 1 Tablet(s) Oral daily  niCARdipine Infusion 5 mG/Hr (25 mL/Hr) IV Continuous <Continuous>  pantoprazole   Suspension 40 milliGRAM(s) Oral daily  piperacillin/tazobactam IVPB.. 3.375 Gram(s) IV Intermittent every 8 hours  polyethylene glycol 3350 17 Gram(s) Oral daily  senna 2 Tablet(s) Oral at bedtime  valsartan 160 milliGRAM(s) Oral daily      RESPIRATORY:  Mode: AC/ CMV (Assist Control/ Continuous Mandatory Ventilation)  RR (machine): 18  TV (machine): 500  FiO2: 50  PEEP: 8  ITime: 1  MAP: 10  PIP: 16      IMAGING:   Recent imaging studies were reviewed.    LAB RESULTS:                          13.5   12.49 )-----------( 202      ( 30 Dec 2024 04:49 )             43.4           12-30    150[H]  |  116[H]  |  42.6[H]  ----------------------------<  183[H]  4.7   |  22.0  |  1.27    Ca    8.3[L]      30 Dec 2024 10:00  Phos  4.2     12-30  Mg     2.6     12-30            ABG - ( 30 Dec 2024 09:56 )  pH, Arterial: 7.490 pH, Blood: x     /  pCO2: 37    /  pO2: 99    / HCO3: 28    / Base Excess: 4.9   /  SaO2: 96.8

## 2024-12-31 NOTE — PROGRESS NOTE ADULT - ASSESSMENT
69 yr old male with acute non-traumatic cerebellar ICH, with vasogenic edema, cerebellar compression; obstructive hydrocephalus.  S/p decompressive SOC 12/21/2024 and 12/27.    Patient is critically ill with high probability of imminent neurologic or life-threatening deterioration due to the following diagnoses:  1) Cerebellar Hemorrhage  2) Obstructive Hydrocephalus  - For which we are monitoring CSF output and adjusting shunt as necessary  3) Respiratory Failure  - For which we are treating with antibiotics and weaning ventilator settings with spontaneous breathing trials as tolerated    Plan:  - neurochecks  - Encephalopathy out of proportion to injury, repeat EEG given expanding hygromas for evidence of cortical irritation  - Start empiric Keppra 750mg BID with 3g load  - Repeat CTH tomorrow monitoring Hygromas  - Osats>92%, normocapnia; vent support  - Volume Goal -500cc to -1L  - Hydralazine, Valsartan 160, Coreg  - Liberalize -160 postop, avoid significant fluctuations  - HR control - aim for <110;   - hold full AC in view of fresh ICH  - monitor e-lytes, I/Os, IVf; maintain Na 140-150, BMP daily; cont flomax  - cont TF, bowel regimen; PPI for ulcer ppx  - monitor FS, aim for 120-180, ISS  - Completed empiric Zosyn course  - SCDs, start SQL    My full attention was spent providing medically necessary critical care to the patient with details documented in my note above.   Critical care time spent examining patient, reviewing vitals, labs, medications, imaging and discussing with the team goals of care   The combined critical care time provided to the patient was 60 minutes  This time does not include bedside procedures that are documented separately.

## 2024-12-31 NOTE — PROCEDURAL SAFETY CHECKLIST WITH OR WITHOUT SEDATION - NSPOSTCOMMENTFT_GEN_ALL_CORE
10cm BD power midline placed with ultrasound guidance. midline catheter placement verified with ultrasound visualization and positive blood return.
Pt tolerated well, safety maintained.

## 2024-12-31 NOTE — DIETITIAN NUTRITION RISK NOTIFICATION - TREATMENT: THE FOLLOWING DIET HAS BEEN RECOMMENDED
Diet, NPO with Tube Feed:   Tube Feeding Modality: Nasogastric  Jevity 1.5 Seun (JEVITY1.5RTH)  Total Volume for 24 Hours (mL): 720  Continuous  Starting Tube Feed Rate {mL per Hour}: 10  Increase Tube Feed Rate by (mL): 10     Every 4 hours  Until Goal Tube Feed Rate (mL per Hour): 30  Tube Feed Duration (in Hours): 24  Tube Feed Start Time: 11:00  Free Water Flush  Bolus   Total Volume per Flush (mL): 250   Frequency: Every 6 Hours (12-30-24 @ 09:45) [Active]

## 2024-12-31 NOTE — PROGRESS NOTE ADULT - SUBJECTIVE AND OBJECTIVE BOX
69 yr old male history of htn, hld , on afib eliquis with prior ischemic stroke 2018, frequent utis, kidney stones, turp, cabg x 2, 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  went to [place], ich, vomited gcs 13 intubated.  Transferred to [facility] for possible SOC.  On arrival patient is sedated on versed, propofol and fent. (21 Dec 2024 16:13)    24hr EVENTS:   12/27 - Worsening exam, new R cerebellar ICH. sp repeat SOC  12/28 - Worsening SpO2 & CXR  12/30 - Good response to Lasix 40mg. Resp improvement after volume loss  12/31 - MRI Brain w/wo performed. Bilateral hygromas mildly expanding, questionable R Frontal cortical DWI restriction on personal read.    ROS: [x ]  Unable to assess due to mental status     PHYSICAL EXAM:  General: Calm, intubated, NAD  Neuro:  -Mental status- not FC, no EO, corneals +, dolls+, PERRL, cough present, o/b the vent;  LUE wdrl, RUE trace movements, BL LE withdrawal.    CV: S1S2 present  Pulm: clear to auscultation BL  Abd: Soft, nontender, nondistended  Ext: no noted edema in lower ext  Skin: warm, dry    ------------------------------------------------------------------------------------------------------  ICU Vital Signs Last 24 Hrs  T(C): 37.1 (31 Dec 2024 12:00), Max: 38 (30 Dec 2024 20:00)  T(F): 98.8 (31 Dec 2024 12:00), Max: 100.4 (30 Dec 2024 20:00)  HR: 69 (31 Dec 2024 12:04) (64 - 83)  BP: 155/66 (31 Dec 2024 12:00) (108/53 - 159/65)  BP(mean): 90 (31 Dec 2024 12:00) (67 - 93)  ABP: 156/53 (31 Dec 2024 12:00) (92/45 - 158/55)  ABP(mean): 82 (31 Dec 2024 12:00) (57 - 92)  RR: 21 (31 Dec 2024 12:00) (13 - 23)  SpO2: 98% (31 Dec 2024 12:04) (94% - 100%)    O2 Parameters below as of 31 Dec 2024 12:00  Patient On (Oxygen Delivery Method): ventilator    O2 Concentration (%): 50        I&O's Summary    30 Dec 2024 07:01  -  31 Dec 2024 07:00  --------------------------------------------------------  IN: 2145 mL / OUT: 3215 mL / NET: -1070 mL    31 Dec 2024 07:01  -  31 Dec 2024 14:11  --------------------------------------------------------  IN: 740 mL / OUT: 970 mL / NET: -230 mL        MEDICATIONS  (STANDING):  acetaminophen   IVPB .. 1000 milliGRAM(s) IV Intermittent once  acetylcysteine 10%  Inhalation 4 milliLiter(s) Inhalation every 6 hours  albuterol    0.083% 2.5 milliGRAM(s) Nebulizer every 6 hours  amLODIPine   Tablet 10 milliGRAM(s) Oral daily  atorvastatin 40 milliGRAM(s) Oral at bedtime  carvedilol 25 milliGRAM(s) Oral every 12 hours  chlorhexidine 0.12% Liquid 15 milliLiter(s) Oral Mucosa every 12 hours  chlorhexidine 2% Cloths 1 Application(s) Topical daily  doxazosin 4 milliGRAM(s) Oral at bedtime  enoxaparin Injectable 40 milliGRAM(s) SubCutaneous <User Schedule>  fentaNYL    Injectable 50 MICROGram(s) IV Push once  folic acid 1 milliGRAM(s) Oral daily  hydrALAZINE 100 milliGRAM(s) Oral every 8 hours  insulin lispro (ADMELOG) corrective regimen sliding scale   SubCutaneous every 6 hours  levETIRAcetam   Injectable 750 milliGRAM(s) IV Push every 12 hours  multivitamin 1 Tablet(s) Oral daily  pantoprazole   Suspension 40 milliGRAM(s) Oral daily  polyethylene glycol 3350 17 Gram(s) Oral daily  senna 2 Tablet(s) Oral at bedtime  valsartan 160 milliGRAM(s) Oral daily      RESPIRATORY:  Mode: AC/ CMV (Assist Control/ Continuous Mandatory Ventilation)  RR (machine): 16  TV (machine): 500  FiO2: 50  PEEP: 6  ITime: 1  MAP: 10  PIP: 22      IMAGING:   Recent imaging studies were reviewed.    LAB RESULTS:                          13.1   12.80 )-----------( 195      ( 31 Dec 2024 05:30 )             41.6           12-31    148[H]  |  111[H]  |  44.3[H]  ----------------------------<  166[H]  4.4   |  24.0  |  1.44[H]    Ca    7.9[L]      31 Dec 2024 05:30  Phos  4.3     12-31  Mg     2.8     12-31            ABG - ( 30 Dec 2024 09:56 )  pH, Arterial: 7.490 pH, Blood: x     /  pCO2: 37    /  pO2: 99    / HCO3: 28    / Base Excess: 4.9   /  SaO2: 96.8

## 2025-01-01 LAB
-  AMOXICILLIN/CLAVULANIC ACID: SIGNIFICANT CHANGE UP
-  AMPICILLIN/SULBACTAM: SIGNIFICANT CHANGE UP
-  AMPICILLIN: SIGNIFICANT CHANGE UP
-  AZTREONAM: SIGNIFICANT CHANGE UP
-  CEFAZOLIN: SIGNIFICANT CHANGE UP
-  CEFEPIME: SIGNIFICANT CHANGE UP
-  CEFOXITIN: SIGNIFICANT CHANGE UP
-  CEFTRIAXONE: SIGNIFICANT CHANGE UP
-  CIPROFLOXACIN: SIGNIFICANT CHANGE UP
-  ERTAPENEM: SIGNIFICANT CHANGE UP
-  GENTAMICIN: SIGNIFICANT CHANGE UP
-  IMIPENEM: SIGNIFICANT CHANGE UP
-  LEVOFLOXACIN: SIGNIFICANT CHANGE UP
-  MEROPENEM: SIGNIFICANT CHANGE UP
-  PIPERACILLIN/TAZOBACTAM: SIGNIFICANT CHANGE UP
-  TOBRAMYCIN: SIGNIFICANT CHANGE UP
-  TRIMETHOPRIM/SULFAMETHOXAZOLE: SIGNIFICANT CHANGE UP
ALBUMIN SERPL ELPH-MCNC: 2 G/DL — LOW (ref 3.3–5.2)
ALBUMIN SERPL ELPH-MCNC: 2.2 G/DL — LOW (ref 3.3–5.2)
ALBUMIN SERPL ELPH-MCNC: 2.3 G/DL — LOW (ref 3.3–5.2)
ALBUMIN SERPL ELPH-MCNC: 2.4 G/DL — LOW (ref 3.3–5.2)
ALP SERPL-CCNC: 71 U/L — SIGNIFICANT CHANGE UP (ref 40–120)
ALP SERPL-CCNC: 74 U/L — SIGNIFICANT CHANGE UP (ref 40–120)
ALP SERPL-CCNC: 80 U/L — SIGNIFICANT CHANGE UP (ref 40–120)
ALP SERPL-CCNC: 82 U/L — SIGNIFICANT CHANGE UP (ref 40–120)
ALT FLD-CCNC: 183 U/L — HIGH
ALT FLD-CCNC: 202 U/L — HIGH
ALT FLD-CCNC: 221 U/L — HIGH
ALT FLD-CCNC: 294 U/L — HIGH
ANION GAP SERPL CALC-SCNC: 10 MMOL/L — SIGNIFICANT CHANGE UP (ref 5–17)
ANION GAP SERPL CALC-SCNC: 11 MMOL/L — SIGNIFICANT CHANGE UP (ref 5–17)
ANION GAP SERPL CALC-SCNC: 12 MMOL/L — SIGNIFICANT CHANGE UP (ref 5–17)
ANION GAP SERPL CALC-SCNC: 14 MMOL/L — SIGNIFICANT CHANGE UP (ref 5–17)
ANION GAP SERPL CALC-SCNC: 8 MMOL/L — SIGNIFICANT CHANGE UP (ref 5–17)
ANION GAP SERPL CALC-SCNC: 9 MMOL/L — SIGNIFICANT CHANGE UP (ref 5–17)
ANION GAP SERPL CALC-SCNC: 9 MMOL/L — SIGNIFICANT CHANGE UP (ref 5–17)
APPEARANCE UR: CLEAR — SIGNIFICANT CHANGE UP
APTT BLD: 26.4 SEC — SIGNIFICANT CHANGE UP (ref 24.5–35.6)
APTT BLD: 27.5 SEC — SIGNIFICANT CHANGE UP (ref 24.5–35.6)
APTT BLD: 28.7 SEC — SIGNIFICANT CHANGE UP (ref 24.5–35.6)
AST SERPL-CCNC: 178 U/L — HIGH
AST SERPL-CCNC: 192 U/L — HIGH
AST SERPL-CCNC: 80 U/L — HIGH
AST SERPL-CCNC: 81 U/L — HIGH
BACTERIA # UR AUTO: ABNORMAL /HPF
BASOPHILS # BLD AUTO: 0.02 K/UL — SIGNIFICANT CHANGE UP (ref 0–0.2)
BASOPHILS # BLD AUTO: 0.03 K/UL — SIGNIFICANT CHANGE UP (ref 0–0.2)
BASOPHILS # BLD AUTO: 0.04 K/UL — SIGNIFICANT CHANGE UP (ref 0–0.2)
BASOPHILS NFR BLD AUTO: 0.1 % — SIGNIFICANT CHANGE UP (ref 0–2)
BASOPHILS NFR BLD AUTO: 0.2 % — SIGNIFICANT CHANGE UP (ref 0–2)
BASOPHILS NFR BLD AUTO: 0.4 % — SIGNIFICANT CHANGE UP (ref 0–2)
BILIRUB DIRECT SERPL-MCNC: 0.1 MG/DL — SIGNIFICANT CHANGE UP (ref 0–0.3)
BILIRUB INDIRECT FLD-MCNC: 0.2 MG/DL — SIGNIFICANT CHANGE UP (ref 0.2–1)
BILIRUB INDIRECT FLD-MCNC: 0.3 MG/DL — SIGNIFICANT CHANGE UP (ref 0.2–1)
BILIRUB INDIRECT FLD-MCNC: SIGNIFICANT CHANGE UP MG/DL (ref 0.2–1)
BILIRUB SERPL-MCNC: 0.3 MG/DL — LOW (ref 0.4–2)
BILIRUB SERPL-MCNC: 0.4 MG/DL — SIGNIFICANT CHANGE UP (ref 0.4–2)
BILIRUB SERPL-MCNC: <0.2 MG/DL — LOW (ref 0.4–2)
BILIRUB SERPL-MCNC: <0.2 MG/DL — LOW (ref 0.4–2)
BILIRUB UR-MCNC: NEGATIVE — SIGNIFICANT CHANGE UP
BLD GP AB SCN SERPL QL: SIGNIFICANT CHANGE UP
BUN SERPL-MCNC: 27.7 MG/DL — HIGH (ref 8–20)
BUN SERPL-MCNC: 28.2 MG/DL — HIGH (ref 8–20)
BUN SERPL-MCNC: 29.4 MG/DL — HIGH (ref 8–20)
BUN SERPL-MCNC: 32.6 MG/DL — HIGH (ref 8–20)
BUN SERPL-MCNC: 37.3 MG/DL — HIGH (ref 8–20)
BUN SERPL-MCNC: 39.4 MG/DL — HIGH (ref 8–20)
BUN SERPL-MCNC: 44.7 MG/DL — HIGH (ref 8–20)
BUN SERPL-MCNC: 47.8 MG/DL — HIGH (ref 8–20)
BUN SERPL-MCNC: 48.2 MG/DL — HIGH (ref 8–20)
BUN SERPL-MCNC: 49.4 MG/DL — HIGH (ref 8–20)
BUN SERPL-MCNC: 49.6 MG/DL — HIGH (ref 8–20)
CALCIUM SERPL-MCNC: 7.7 MG/DL — LOW (ref 8.4–10.5)
CALCIUM SERPL-MCNC: 7.8 MG/DL — LOW (ref 8.4–10.5)
CALCIUM SERPL-MCNC: 7.8 MG/DL — LOW (ref 8.4–10.5)
CALCIUM SERPL-MCNC: 7.9 MG/DL — LOW (ref 8.4–10.5)
CALCIUM SERPL-MCNC: 7.9 MG/DL — LOW (ref 8.4–10.5)
CALCIUM SERPL-MCNC: 8 MG/DL — LOW (ref 8.4–10.5)
CALCIUM SERPL-MCNC: 8 MG/DL — LOW (ref 8.4–10.5)
CALCIUM SERPL-MCNC: 8.1 MG/DL — LOW (ref 8.4–10.5)
CALCIUM SERPL-MCNC: 8.1 MG/DL — LOW (ref 8.4–10.5)
CALCIUM SERPL-MCNC: 8.4 MG/DL — SIGNIFICANT CHANGE UP (ref 8.4–10.5)
CALCIUM SERPL-MCNC: 8.4 MG/DL — SIGNIFICANT CHANGE UP (ref 8.4–10.5)
CAST: 3 /LPF — SIGNIFICANT CHANGE UP (ref 0–4)
CHLORIDE SERPL-SCNC: 110 MMOL/L — HIGH (ref 96–108)
CHLORIDE SERPL-SCNC: 111 MMOL/L — HIGH (ref 96–108)
CHLORIDE SERPL-SCNC: 112 MMOL/L — HIGH (ref 96–108)
CHLORIDE SERPL-SCNC: 112 MMOL/L — HIGH (ref 96–108)
CHLORIDE SERPL-SCNC: 113 MMOL/L — HIGH (ref 96–108)
CO2 SERPL-SCNC: 20 MMOL/L — LOW (ref 22–29)
CO2 SERPL-SCNC: 22 MMOL/L — SIGNIFICANT CHANGE UP (ref 22–29)
CO2 SERPL-SCNC: 23 MMOL/L — SIGNIFICANT CHANGE UP (ref 22–29)
CO2 SERPL-SCNC: 24 MMOL/L — SIGNIFICANT CHANGE UP (ref 22–29)
CO2 SERPL-SCNC: 25 MMOL/L — SIGNIFICANT CHANGE UP (ref 22–29)
CO2 SERPL-SCNC: 25 MMOL/L — SIGNIFICANT CHANGE UP (ref 22–29)
COLOR SPEC: YELLOW — SIGNIFICANT CHANGE UP
CREAT SERPL-MCNC: 0.9 MG/DL — SIGNIFICANT CHANGE UP (ref 0.5–1.3)
CREAT SERPL-MCNC: 0.98 MG/DL — SIGNIFICANT CHANGE UP (ref 0.5–1.3)
CREAT SERPL-MCNC: 1.01 MG/DL — SIGNIFICANT CHANGE UP (ref 0.5–1.3)
CREAT SERPL-MCNC: 1.04 MG/DL — SIGNIFICANT CHANGE UP (ref 0.5–1.3)
CREAT SERPL-MCNC: 1.05 MG/DL — SIGNIFICANT CHANGE UP (ref 0.5–1.3)
CREAT SERPL-MCNC: 1.08 MG/DL — SIGNIFICANT CHANGE UP (ref 0.5–1.3)
CREAT SERPL-MCNC: 1.22 MG/DL — SIGNIFICANT CHANGE UP (ref 0.5–1.3)
CREAT SERPL-MCNC: 1.27 MG/DL — SIGNIFICANT CHANGE UP (ref 0.5–1.3)
CREAT SERPL-MCNC: 1.31 MG/DL — HIGH (ref 0.5–1.3)
CREAT SERPL-MCNC: 1.34 MG/DL — HIGH (ref 0.5–1.3)
CREAT SERPL-MCNC: 1.34 MG/DL — HIGH (ref 0.5–1.3)
CULTURE RESULTS: ABNORMAL
CULTURE RESULTS: SIGNIFICANT CHANGE UP
CULTURE RESULTS: SIGNIFICANT CHANGE UP
DIFF PNL FLD: NEGATIVE — SIGNIFICANT CHANGE UP
EGFR: 57 ML/MIN/1.73M2 — LOW
EGFR: 57 ML/MIN/1.73M2 — LOW
EGFR: 59 ML/MIN/1.73M2 — LOW
EGFR: 61 ML/MIN/1.73M2 — SIGNIFICANT CHANGE UP
EGFR: 64 ML/MIN/1.73M2 — SIGNIFICANT CHANGE UP
EGFR: 74 ML/MIN/1.73M2 — SIGNIFICANT CHANGE UP
EGFR: 76 ML/MIN/1.73M2 — SIGNIFICANT CHANGE UP
EGFR: 77 ML/MIN/1.73M2 — SIGNIFICANT CHANGE UP
EGFR: 80 ML/MIN/1.73M2 — SIGNIFICANT CHANGE UP
EGFR: 83 ML/MIN/1.73M2 — SIGNIFICANT CHANGE UP
EGFR: 92 ML/MIN/1.73M2 — SIGNIFICANT CHANGE UP
EOSINOPHIL # BLD AUTO: 0.13 K/UL — SIGNIFICANT CHANGE UP (ref 0–0.5)
EOSINOPHIL # BLD AUTO: 0.14 K/UL — SIGNIFICANT CHANGE UP (ref 0–0.5)
EOSINOPHIL # BLD AUTO: 0.17 K/UL — SIGNIFICANT CHANGE UP (ref 0–0.5)
EOSINOPHIL NFR BLD AUTO: 0.9 % — SIGNIFICANT CHANGE UP (ref 0–6)
EOSINOPHIL NFR BLD AUTO: 1 % — SIGNIFICANT CHANGE UP (ref 0–6)
EOSINOPHIL NFR BLD AUTO: 1.6 % — SIGNIFICANT CHANGE UP (ref 0–6)
FINE GRAN CASTS #/AREA URNS AUTO: PRESENT
GLUCOSE BLDC GLUCOMTR-MCNC: 101 MG/DL — HIGH (ref 70–99)
GLUCOSE BLDC GLUCOMTR-MCNC: 101 MG/DL — HIGH (ref 70–99)
GLUCOSE BLDC GLUCOMTR-MCNC: 109 MG/DL — HIGH (ref 70–99)
GLUCOSE BLDC GLUCOMTR-MCNC: 117 MG/DL — HIGH (ref 70–99)
GLUCOSE BLDC GLUCOMTR-MCNC: 122 MG/DL — HIGH (ref 70–99)
GLUCOSE BLDC GLUCOMTR-MCNC: 126 MG/DL — HIGH (ref 70–99)
GLUCOSE BLDC GLUCOMTR-MCNC: 129 MG/DL — HIGH (ref 70–99)
GLUCOSE BLDC GLUCOMTR-MCNC: 132 MG/DL — HIGH (ref 70–99)
GLUCOSE BLDC GLUCOMTR-MCNC: 134 MG/DL — HIGH (ref 70–99)
GLUCOSE BLDC GLUCOMTR-MCNC: 138 MG/DL — HIGH (ref 70–99)
GLUCOSE BLDC GLUCOMTR-MCNC: 140 MG/DL — HIGH (ref 70–99)
GLUCOSE BLDC GLUCOMTR-MCNC: 140 MG/DL — HIGH (ref 70–99)
GLUCOSE BLDC GLUCOMTR-MCNC: 142 MG/DL — HIGH (ref 70–99)
GLUCOSE BLDC GLUCOMTR-MCNC: 145 MG/DL — HIGH (ref 70–99)
GLUCOSE BLDC GLUCOMTR-MCNC: 149 MG/DL — HIGH (ref 70–99)
GLUCOSE BLDC GLUCOMTR-MCNC: 150 MG/DL — HIGH (ref 70–99)
GLUCOSE BLDC GLUCOMTR-MCNC: 151 MG/DL — HIGH (ref 70–99)
GLUCOSE BLDC GLUCOMTR-MCNC: 154 MG/DL — HIGH (ref 70–99)
GLUCOSE BLDC GLUCOMTR-MCNC: 163 MG/DL — HIGH (ref 70–99)
GLUCOSE BLDC GLUCOMTR-MCNC: 196 MG/DL — HIGH (ref 70–99)
GLUCOSE BLDC GLUCOMTR-MCNC: 202 MG/DL — HIGH (ref 70–99)
GLUCOSE BLDC GLUCOMTR-MCNC: 87 MG/DL — SIGNIFICANT CHANGE UP (ref 70–99)
GLUCOSE BLDC GLUCOMTR-MCNC: 98 MG/DL — SIGNIFICANT CHANGE UP (ref 70–99)
GLUCOSE SERPL-MCNC: 105 MG/DL — HIGH (ref 70–99)
GLUCOSE SERPL-MCNC: 122 MG/DL — HIGH (ref 70–99)
GLUCOSE SERPL-MCNC: 129 MG/DL — HIGH (ref 70–99)
GLUCOSE SERPL-MCNC: 132 MG/DL — HIGH (ref 70–99)
GLUCOSE SERPL-MCNC: 155 MG/DL — HIGH (ref 70–99)
GLUCOSE SERPL-MCNC: 165 MG/DL — HIGH (ref 70–99)
GLUCOSE SERPL-MCNC: 165 MG/DL — HIGH (ref 70–99)
GLUCOSE SERPL-MCNC: 166 MG/DL — HIGH (ref 70–99)
GLUCOSE SERPL-MCNC: 173 MG/DL — HIGH (ref 70–99)
GLUCOSE SERPL-MCNC: 176 MG/DL — HIGH (ref 70–99)
GLUCOSE SERPL-MCNC: 99 MG/DL — SIGNIFICANT CHANGE UP (ref 70–99)
GLUCOSE UR QL: NEGATIVE MG/DL — SIGNIFICANT CHANGE UP
GRAM STN FLD: ABNORMAL
HCT VFR BLD CALC: 30.4 % — LOW (ref 39–50)
HCT VFR BLD CALC: 31.8 % — LOW (ref 39–50)
HCT VFR BLD CALC: 33.6 % — LOW (ref 39–50)
HCT VFR BLD CALC: 33.8 % — LOW (ref 39–50)
HCT VFR BLD CALC: 34 % — LOW (ref 39–50)
HCT VFR BLD CALC: 36 % — LOW (ref 39–50)
HCT VFR BLD CALC: 36.4 % — LOW (ref 39–50)
HCT VFR BLD CALC: 37.6 % — LOW (ref 39–50)
HCT VFR BLD CALC: 38.2 % — LOW (ref 39–50)
HCT VFR BLD CALC: 39.9 % — SIGNIFICANT CHANGE UP (ref 39–50)
HGB BLD-MCNC: 10.6 G/DL — LOW (ref 13–17)
HGB BLD-MCNC: 10.7 G/DL — LOW (ref 13–17)
HGB BLD-MCNC: 10.8 G/DL — LOW (ref 13–17)
HGB BLD-MCNC: 11.5 G/DL — LOW (ref 13–17)
HGB BLD-MCNC: 11.5 G/DL — LOW (ref 13–17)
HGB BLD-MCNC: 12 G/DL — LOW (ref 13–17)
HGB BLD-MCNC: 12.3 G/DL — LOW (ref 13–17)
HGB BLD-MCNC: 12.5 G/DL — LOW (ref 13–17)
HGB BLD-MCNC: 9.6 G/DL — LOW (ref 13–17)
HGB BLD-MCNC: 9.9 G/DL — LOW (ref 13–17)
IMM GRANULOCYTES NFR BLD AUTO: 0.5 % — SIGNIFICANT CHANGE UP (ref 0–0.9)
IMM GRANULOCYTES NFR BLD AUTO: 0.7 % — SIGNIFICANT CHANGE UP (ref 0–0.9)
IMM GRANULOCYTES NFR BLD AUTO: 1.3 % — HIGH (ref 0–0.9)
INR BLD: 1.2 RATIO — HIGH (ref 0.85–1.16)
INR BLD: 1.21 RATIO — HIGH (ref 0.85–1.16)
INR BLD: 1.22 RATIO — HIGH (ref 0.85–1.16)
KETONES UR-MCNC: NEGATIVE MG/DL — SIGNIFICANT CHANGE UP
LACTATE SERPL-SCNC: 0.6 MMOL/L — SIGNIFICANT CHANGE UP (ref 0.5–2)
LEUKOCYTE ESTERASE UR-ACNC: NEGATIVE — SIGNIFICANT CHANGE UP
LYMPHOCYTES # BLD AUTO: 0.67 K/UL — LOW (ref 1–3.3)
LYMPHOCYTES # BLD AUTO: 0.79 K/UL — LOW (ref 1–3.3)
LYMPHOCYTES # BLD AUTO: 0.81 K/UL — LOW (ref 1–3.3)
LYMPHOCYTES # BLD AUTO: 5.6 % — LOW (ref 13–44)
LYMPHOCYTES # BLD AUTO: 5.9 % — LOW (ref 13–44)
LYMPHOCYTES # BLD AUTO: 6.1 % — LOW (ref 13–44)
MAGNESIUM SERPL-MCNC: 2.3 MG/DL — SIGNIFICANT CHANGE UP (ref 1.6–2.6)
MAGNESIUM SERPL-MCNC: 2.4 MG/DL — SIGNIFICANT CHANGE UP (ref 1.6–2.6)
MAGNESIUM SERPL-MCNC: 2.5 MG/DL — SIGNIFICANT CHANGE UP (ref 1.6–2.6)
MAGNESIUM SERPL-MCNC: 2.5 MG/DL — SIGNIFICANT CHANGE UP (ref 1.6–2.6)
MAGNESIUM SERPL-MCNC: 2.7 MG/DL — HIGH (ref 1.6–2.6)
MAGNESIUM SERPL-MCNC: 2.8 MG/DL — HIGH (ref 1.6–2.6)
MCHC RBC-ENTMCNC: 30.6 PG — SIGNIFICANT CHANGE UP (ref 27–34)
MCHC RBC-ENTMCNC: 30.7 PG — SIGNIFICANT CHANGE UP (ref 27–34)
MCHC RBC-ENTMCNC: 30.7 PG — SIGNIFICANT CHANGE UP (ref 27–34)
MCHC RBC-ENTMCNC: 30.8 PG — SIGNIFICANT CHANGE UP (ref 27–34)
MCHC RBC-ENTMCNC: 30.8 PG — SIGNIFICANT CHANGE UP (ref 27–34)
MCHC RBC-ENTMCNC: 30.9 PG — SIGNIFICANT CHANGE UP (ref 27–34)
MCHC RBC-ENTMCNC: 31 PG — SIGNIFICANT CHANGE UP (ref 27–34)
MCHC RBC-ENTMCNC: 31.1 G/DL — LOW (ref 32–36)
MCHC RBC-ENTMCNC: 31.1 PG — SIGNIFICANT CHANGE UP (ref 27–34)
MCHC RBC-ENTMCNC: 31.2 PG — SIGNIFICANT CHANGE UP (ref 27–34)
MCHC RBC-ENTMCNC: 31.3 G/DL — LOW (ref 32–36)
MCHC RBC-ENTMCNC: 31.3 PG — SIGNIFICANT CHANGE UP (ref 27–34)
MCHC RBC-ENTMCNC: 31.5 G/DL — LOW (ref 32–36)
MCHC RBC-ENTMCNC: 31.6 G/DL — LOW (ref 32–36)
MCHC RBC-ENTMCNC: 31.6 G/DL — LOW (ref 32–36)
MCHC RBC-ENTMCNC: 31.7 G/DL — LOW (ref 32–36)
MCHC RBC-ENTMCNC: 31.8 G/DL — LOW (ref 32–36)
MCHC RBC-ENTMCNC: 31.9 G/DL — LOW (ref 32–36)
MCHC RBC-ENTMCNC: 31.9 G/DL — LOW (ref 32–36)
MCHC RBC-ENTMCNC: 32.2 G/DL — SIGNIFICANT CHANGE UP (ref 32–36)
MCV RBC AUTO: 96.3 FL — SIGNIFICANT CHANGE UP (ref 80–100)
MCV RBC AUTO: 96.4 FL — SIGNIFICANT CHANGE UP (ref 80–100)
MCV RBC AUTO: 96.7 FL — SIGNIFICANT CHANGE UP (ref 80–100)
MCV RBC AUTO: 96.8 FL — SIGNIFICANT CHANGE UP (ref 80–100)
MCV RBC AUTO: 97.1 FL — SIGNIFICANT CHANGE UP (ref 80–100)
MCV RBC AUTO: 97.3 FL — SIGNIFICANT CHANGE UP (ref 80–100)
MCV RBC AUTO: 98.3 FL — SIGNIFICANT CHANGE UP (ref 80–100)
MCV RBC AUTO: 98.5 FL — SIGNIFICANT CHANGE UP (ref 80–100)
MCV RBC AUTO: 99.1 FL — SIGNIFICANT CHANGE UP (ref 80–100)
MCV RBC AUTO: 99.7 FL — SIGNIFICANT CHANGE UP (ref 80–100)
METHOD TYPE: SIGNIFICANT CHANGE UP
MONOCYTES # BLD AUTO: 0.8 K/UL — SIGNIFICANT CHANGE UP (ref 0–0.9)
MONOCYTES # BLD AUTO: 1.48 K/UL — HIGH (ref 0–0.9)
MONOCYTES # BLD AUTO: 1.69 K/UL — HIGH (ref 0–0.9)
MONOCYTES NFR BLD AUTO: 11 % — SIGNIFICANT CHANGE UP (ref 2–14)
MONOCYTES NFR BLD AUTO: 11.8 % — SIGNIFICANT CHANGE UP (ref 2–14)
MONOCYTES NFR BLD AUTO: 7.3 % — SIGNIFICANT CHANGE UP (ref 2–14)
MRSA PCR RESULT.: SIGNIFICANT CHANGE UP
NEUTROPHILS # BLD AUTO: 10.92 K/UL — HIGH (ref 1.8–7.4)
NEUTROPHILS # BLD AUTO: 11.53 K/UL — HIGH (ref 1.8–7.4)
NEUTROPHILS # BLD AUTO: 9.2 K/UL — HIGH (ref 1.8–7.4)
NEUTROPHILS NFR BLD AUTO: 80.2 % — HIGH (ref 43–77)
NEUTROPHILS NFR BLD AUTO: 81.3 % — HIGH (ref 43–77)
NEUTROPHILS NFR BLD AUTO: 84.1 % — HIGH (ref 43–77)
NITRITE UR-MCNC: NEGATIVE — SIGNIFICANT CHANGE UP
ORGANISM # SPEC MICROSCOPIC CNT: ABNORMAL
ORGANISM # SPEC MICROSCOPIC CNT: SIGNIFICANT CHANGE UP
PH UR: 5.5 — SIGNIFICANT CHANGE UP (ref 5–8)
PHOSPHATE SERPL-MCNC: 2.8 MG/DL — SIGNIFICANT CHANGE UP (ref 2.4–4.7)
PHOSPHATE SERPL-MCNC: 3 MG/DL — SIGNIFICANT CHANGE UP (ref 2.4–4.7)
PHOSPHATE SERPL-MCNC: 3.4 MG/DL — SIGNIFICANT CHANGE UP (ref 2.4–4.7)
PHOSPHATE SERPL-MCNC: 3.5 MG/DL — SIGNIFICANT CHANGE UP (ref 2.4–4.7)
PHOSPHATE SERPL-MCNC: 3.6 MG/DL — SIGNIFICANT CHANGE UP (ref 2.4–4.7)
PHOSPHATE SERPL-MCNC: 3.9 MG/DL — SIGNIFICANT CHANGE UP (ref 2.4–4.7)
PHOSPHATE SERPL-MCNC: 3.9 MG/DL — SIGNIFICANT CHANGE UP (ref 2.4–4.7)
PLATELET # BLD AUTO: 179 K/UL — SIGNIFICANT CHANGE UP (ref 150–400)
PLATELET # BLD AUTO: 184 K/UL — SIGNIFICANT CHANGE UP (ref 150–400)
PLATELET # BLD AUTO: 187 K/UL — SIGNIFICANT CHANGE UP (ref 150–400)
PLATELET # BLD AUTO: 187 K/UL — SIGNIFICANT CHANGE UP (ref 150–400)
PLATELET # BLD AUTO: 192 K/UL — SIGNIFICANT CHANGE UP (ref 150–400)
PLATELET # BLD AUTO: 193 K/UL — SIGNIFICANT CHANGE UP (ref 150–400)
PLATELET # BLD AUTO: 207 K/UL — SIGNIFICANT CHANGE UP (ref 150–400)
PLATELET # BLD AUTO: 209 K/UL — SIGNIFICANT CHANGE UP (ref 150–400)
PLATELET # BLD AUTO: 211 K/UL — SIGNIFICANT CHANGE UP (ref 150–400)
PLATELET # BLD AUTO: 228 K/UL — SIGNIFICANT CHANGE UP (ref 150–400)
POTASSIUM SERPL-MCNC: 4 MMOL/L — SIGNIFICANT CHANGE UP (ref 3.5–5.3)
POTASSIUM SERPL-MCNC: 4.1 MMOL/L — SIGNIFICANT CHANGE UP (ref 3.5–5.3)
POTASSIUM SERPL-MCNC: 4.2 MMOL/L — SIGNIFICANT CHANGE UP (ref 3.5–5.3)
POTASSIUM SERPL-MCNC: 4.3 MMOL/L — SIGNIFICANT CHANGE UP (ref 3.5–5.3)
POTASSIUM SERPL-MCNC: 4.4 MMOL/L — SIGNIFICANT CHANGE UP (ref 3.5–5.3)
POTASSIUM SERPL-MCNC: 4.5 MMOL/L — SIGNIFICANT CHANGE UP (ref 3.5–5.3)
POTASSIUM SERPL-MCNC: 4.7 MMOL/L — SIGNIFICANT CHANGE UP (ref 3.5–5.3)
POTASSIUM SERPL-SCNC: 4 MMOL/L — SIGNIFICANT CHANGE UP (ref 3.5–5.3)
POTASSIUM SERPL-SCNC: 4.1 MMOL/L — SIGNIFICANT CHANGE UP (ref 3.5–5.3)
POTASSIUM SERPL-SCNC: 4.2 MMOL/L — SIGNIFICANT CHANGE UP (ref 3.5–5.3)
POTASSIUM SERPL-SCNC: 4.3 MMOL/L — SIGNIFICANT CHANGE UP (ref 3.5–5.3)
POTASSIUM SERPL-SCNC: 4.4 MMOL/L — SIGNIFICANT CHANGE UP (ref 3.5–5.3)
POTASSIUM SERPL-SCNC: 4.5 MMOL/L — SIGNIFICANT CHANGE UP (ref 3.5–5.3)
POTASSIUM SERPL-SCNC: 4.7 MMOL/L — SIGNIFICANT CHANGE UP (ref 3.5–5.3)
PROCALCITONIN SERPL-MCNC: 0.1 NG/ML — SIGNIFICANT CHANGE UP (ref 0.02–0.1)
PROT SERPL-MCNC: 5.3 G/DL — LOW (ref 6.6–8.7)
PROT SERPL-MCNC: 5.6 G/DL — LOW (ref 6.6–8.7)
PROT SERPL-MCNC: 5.9 G/DL — LOW (ref 6.6–8.7)
PROT SERPL-MCNC: 6.2 G/DL — LOW (ref 6.6–8.7)
PROT UR-MCNC: 30 MG/DL
PROTHROM AB SERPL-ACNC: 13.5 SEC — HIGH (ref 9.9–13.4)
PROTHROM AB SERPL-ACNC: 13.7 SEC — HIGH (ref 9.9–13.4)
PROTHROM AB SERPL-ACNC: 13.8 SEC — HIGH (ref 9.9–13.4)
RAPID RVP RESULT: SIGNIFICANT CHANGE UP
RBC # BLD: 3.14 M/UL — LOW (ref 4.2–5.8)
RBC # BLD: 3.19 M/UL — LOW (ref 4.2–5.8)
RBC # BLD: 3.39 M/UL — LOW (ref 4.2–5.8)
RBC # BLD: 3.43 M/UL — LOW (ref 4.2–5.8)
RBC # BLD: 3.5 M/UL — LOW (ref 4.2–5.8)
RBC # BLD: 3.74 M/UL — LOW (ref 4.2–5.8)
RBC # BLD: 3.74 M/UL — LOW (ref 4.2–5.8)
RBC # BLD: 3.9 M/UL — LOW (ref 4.2–5.8)
RBC # BLD: 3.95 M/UL — LOW (ref 4.2–5.8)
RBC # BLD: 4.06 M/UL — LOW (ref 4.2–5.8)
RBC # FLD: 12.8 % — SIGNIFICANT CHANGE UP (ref 10.3–14.5)
RBC # FLD: 12.9 % — SIGNIFICANT CHANGE UP (ref 10.3–14.5)
RBC # FLD: 13 % — SIGNIFICANT CHANGE UP (ref 10.3–14.5)
RBC # FLD: 13 % — SIGNIFICANT CHANGE UP (ref 10.3–14.5)
RBC # FLD: 13.2 % — SIGNIFICANT CHANGE UP (ref 10.3–14.5)
RBC CASTS # UR COMP ASSIST: 5 /HPF — HIGH (ref 0–4)
S AUREUS DNA NOSE QL NAA+PROBE: SIGNIFICANT CHANGE UP
SARS-COV-2 RNA SPEC QL NAA+PROBE: SIGNIFICANT CHANGE UP
SODIUM SERPL-SCNC: 140 MMOL/L — SIGNIFICANT CHANGE UP (ref 135–145)
SODIUM SERPL-SCNC: 142 MMOL/L — SIGNIFICANT CHANGE UP (ref 135–145)
SODIUM SERPL-SCNC: 142 MMOL/L — SIGNIFICANT CHANGE UP (ref 135–145)
SODIUM SERPL-SCNC: 143 MMOL/L — SIGNIFICANT CHANGE UP (ref 135–145)
SODIUM SERPL-SCNC: 143 MMOL/L — SIGNIFICANT CHANGE UP (ref 135–145)
SODIUM SERPL-SCNC: 144 MMOL/L — SIGNIFICANT CHANGE UP (ref 135–145)
SODIUM SERPL-SCNC: 145 MMOL/L — SIGNIFICANT CHANGE UP (ref 135–145)
SODIUM SERPL-SCNC: 147 MMOL/L — HIGH (ref 135–145)
SP GR SPEC: 1.02 — SIGNIFICANT CHANGE UP (ref 1–1.03)
SPECIMEN SOURCE: SIGNIFICANT CHANGE UP
SQUAMOUS # UR AUTO: 3 /HPF — SIGNIFICANT CHANGE UP (ref 0–5)
UROBILINOGEN FLD QL: 0.2 MG/DL — SIGNIFICANT CHANGE UP (ref 0.2–1)
WBC # BLD: 10.79 K/UL — HIGH (ref 3.8–10.5)
WBC # BLD: 10.91 K/UL — HIGH (ref 3.8–10.5)
WBC # BLD: 10.93 K/UL — HIGH (ref 3.8–10.5)
WBC # BLD: 11.84 K/UL — HIGH (ref 3.8–10.5)
WBC # BLD: 12.55 K/UL — HIGH (ref 3.8–10.5)
WBC # BLD: 12.93 K/UL — HIGH (ref 3.8–10.5)
WBC # BLD: 13.45 K/UL — HIGH (ref 3.8–10.5)
WBC # BLD: 13.53 K/UL — HIGH (ref 3.8–10.5)
WBC # BLD: 14.37 K/UL — HIGH (ref 3.8–10.5)
WBC # BLD: 9.49 K/UL — SIGNIFICANT CHANGE UP (ref 3.8–10.5)
WBC # FLD AUTO: 10.79 K/UL — HIGH (ref 3.8–10.5)
WBC # FLD AUTO: 10.91 K/UL — HIGH (ref 3.8–10.5)
WBC # FLD AUTO: 10.93 K/UL — HIGH (ref 3.8–10.5)
WBC # FLD AUTO: 11.84 K/UL — HIGH (ref 3.8–10.5)
WBC # FLD AUTO: 12.55 K/UL — HIGH (ref 3.8–10.5)
WBC # FLD AUTO: 12.93 K/UL — HIGH (ref 3.8–10.5)
WBC # FLD AUTO: 13.45 K/UL — HIGH (ref 3.8–10.5)
WBC # FLD AUTO: 13.53 K/UL — HIGH (ref 3.8–10.5)
WBC # FLD AUTO: 14.37 K/UL — HIGH (ref 3.8–10.5)
WBC # FLD AUTO: 9.49 K/UL — SIGNIFICANT CHANGE UP (ref 3.8–10.5)
WBC UR QL: 2 /HPF — SIGNIFICANT CHANGE UP (ref 0–5)

## 2025-01-01 PROCEDURE — 84295 ASSAY OF SERUM SODIUM: CPT

## 2025-01-01 PROCEDURE — 83735 ASSAY OF MAGNESIUM: CPT

## 2025-01-01 PROCEDURE — 99497 ADVNCD CARE PLAN 30 MIN: CPT | Mod: 25

## 2025-01-01 PROCEDURE — 36600 WITHDRAWAL OF ARTERIAL BLOOD: CPT

## 2025-01-01 PROCEDURE — P9047: CPT

## 2025-01-01 PROCEDURE — 70553 MRI BRAIN STEM W/O & W/DYE: CPT | Mod: MC

## 2025-01-01 PROCEDURE — 87640 STAPH A DNA AMP PROBE: CPT

## 2025-01-01 PROCEDURE — C1763: CPT

## 2025-01-01 PROCEDURE — 99232 SBSQ HOSP IP/OBS MODERATE 35: CPT

## 2025-01-01 PROCEDURE — G0316 PROLONG INPT EVAL ADD15 M: CPT

## 2025-01-01 PROCEDURE — 85025 COMPLETE CBC W/AUTO DIFF WBC: CPT

## 2025-01-01 PROCEDURE — 85027 COMPLETE CBC AUTOMATED: CPT

## 2025-01-01 PROCEDURE — 80061 LIPID PANEL: CPT

## 2025-01-01 PROCEDURE — 71045 X-RAY EXAM CHEST 1 VIEW: CPT

## 2025-01-01 PROCEDURE — 87077 CULTURE AEROBIC IDENTIFY: CPT

## 2025-01-01 PROCEDURE — 31500 INSERT EMERGENCY AIRWAY: CPT

## 2025-01-01 PROCEDURE — C1889: CPT

## 2025-01-01 PROCEDURE — 99291 CRITICAL CARE FIRST HOUR: CPT

## 2025-01-01 PROCEDURE — 85610 PROTHROMBIN TIME: CPT

## 2025-01-01 PROCEDURE — 80076 HEPATIC FUNCTION PANEL: CPT

## 2025-01-01 PROCEDURE — 70450 CT HEAD/BRAIN W/O DYE: CPT | Mod: 26

## 2025-01-01 PROCEDURE — 84145 PROCALCITONIN (PCT): CPT

## 2025-01-01 PROCEDURE — 93970 EXTREMITY STUDY: CPT | Mod: 26

## 2025-01-01 PROCEDURE — 99233 SBSQ HOSP IP/OBS HIGH 50: CPT

## 2025-01-01 PROCEDURE — 85018 HEMOGLOBIN: CPT

## 2025-01-01 PROCEDURE — 87040 BLOOD CULTURE FOR BACTERIA: CPT

## 2025-01-01 PROCEDURE — 84100 ASSAY OF PHOSPHORUS: CPT

## 2025-01-01 PROCEDURE — 95718 EEG PHYS/QHP 2-12 HR W/VEEG: CPT

## 2025-01-01 PROCEDURE — 82248 BILIRUBIN DIRECT: CPT

## 2025-01-01 PROCEDURE — 76705 ECHO EXAM OF ABDOMEN: CPT

## 2025-01-01 PROCEDURE — 80164 ASSAY DIPROPYLACETIC ACD TOT: CPT

## 2025-01-01 PROCEDURE — 71045 X-RAY EXAM CHEST 1 VIEW: CPT | Mod: 26

## 2025-01-01 PROCEDURE — 85014 HEMATOCRIT: CPT

## 2025-01-01 PROCEDURE — 99232 SBSQ HOSP IP/OBS MODERATE 35: CPT | Mod: GC

## 2025-01-01 PROCEDURE — 86923 COMPATIBILITY TEST ELECTRIC: CPT

## 2025-01-01 PROCEDURE — 87641 MR-STAPH DNA AMP PROBE: CPT

## 2025-01-01 PROCEDURE — 94002 VENT MGMT INPAT INIT DAY: CPT

## 2025-01-01 PROCEDURE — 95700 EEG CONT REC W/VID EEG TECH: CPT

## 2025-01-01 PROCEDURE — 94760 N-INVAS EAR/PLS OXIMETRY 1: CPT

## 2025-01-01 PROCEDURE — C1769: CPT

## 2025-01-01 PROCEDURE — 95813 EEG EXTND MNTR 61-119 MIN: CPT

## 2025-01-01 PROCEDURE — 82435 ASSAY OF BLOOD CHLORIDE: CPT

## 2025-01-01 PROCEDURE — 70551 MRI BRAIN STEM W/O DYE: CPT | Mod: MC

## 2025-01-01 PROCEDURE — 0225U NFCT DS DNA&RNA 21 SARSCOV2: CPT

## 2025-01-01 PROCEDURE — 70450 CT HEAD/BRAIN W/O DYE: CPT | Mod: 26,77

## 2025-01-01 PROCEDURE — 86901 BLOOD TYPING SEROLOGIC RH(D): CPT

## 2025-01-01 PROCEDURE — 93970 EXTREMITY STUDY: CPT

## 2025-01-01 PROCEDURE — 87070 CULTURE OTHR SPECIMN AEROBIC: CPT

## 2025-01-01 PROCEDURE — 84480 ASSAY TRIIODOTHYRONINE (T3): CPT

## 2025-01-01 PROCEDURE — 81001 URINALYSIS AUTO W/SCOPE: CPT

## 2025-01-01 PROCEDURE — 94003 VENT MGMT INPAT SUBQ DAY: CPT

## 2025-01-01 PROCEDURE — 95711 VEEG 2-12 HR UNMONITORED: CPT

## 2025-01-01 PROCEDURE — 80048 BASIC METABOLIC PNL TOTAL CA: CPT

## 2025-01-01 PROCEDURE — 95714 VEEG EA 12-26 HR UNMNTR: CPT

## 2025-01-01 PROCEDURE — 84439 ASSAY OF FREE THYROXINE: CPT

## 2025-01-01 PROCEDURE — 93005 ELECTROCARDIOGRAM TRACING: CPT

## 2025-01-01 PROCEDURE — P9037: CPT

## 2025-01-01 PROCEDURE — 87086 URINE CULTURE/COLONY COUNT: CPT

## 2025-01-01 PROCEDURE — 70450 CT HEAD/BRAIN W/O DYE: CPT | Mod: MC

## 2025-01-01 PROCEDURE — 94640 AIRWAY INHALATION TREATMENT: CPT

## 2025-01-01 PROCEDURE — 99232 SBSQ HOSP IP/OBS MODERATE 35: CPT | Mod: GC,57

## 2025-01-01 PROCEDURE — 82330 ASSAY OF CALCIUM: CPT

## 2025-01-01 PROCEDURE — 82803 BLOOD GASES ANY COMBINATION: CPT

## 2025-01-01 PROCEDURE — 82962 GLUCOSE BLOOD TEST: CPT

## 2025-01-01 PROCEDURE — 84443 ASSAY THYROID STIM HORMONE: CPT

## 2025-01-01 PROCEDURE — 83036 HEMOGLOBIN GLYCOSYLATED A1C: CPT

## 2025-01-01 PROCEDURE — 80053 COMPREHEN METABOLIC PANEL: CPT

## 2025-01-01 PROCEDURE — 95720 EEG PHY/QHP EA INCR W/VEEG: CPT

## 2025-01-01 PROCEDURE — 86850 RBC ANTIBODY SCREEN: CPT

## 2025-01-01 PROCEDURE — 93010 ELECTROCARDIOGRAM REPORT: CPT

## 2025-01-01 PROCEDURE — 85730 THROMBOPLASTIN TIME PARTIAL: CPT

## 2025-01-01 PROCEDURE — 84436 ASSAY OF TOTAL THYROXINE: CPT

## 2025-01-01 PROCEDURE — 82947 ASSAY GLUCOSE BLOOD QUANT: CPT

## 2025-01-01 PROCEDURE — 36415 COLL VENOUS BLD VENIPUNCTURE: CPT

## 2025-01-01 PROCEDURE — P9100: CPT

## 2025-01-01 PROCEDURE — 86900 BLOOD TYPING SEROLOGIC ABO: CPT

## 2025-01-01 PROCEDURE — 87205 SMEAR GRAM STAIN: CPT

## 2025-01-01 PROCEDURE — 87186 SC STD MICRODIL/AGAR DIL: CPT

## 2025-01-01 PROCEDURE — C8929: CPT

## 2025-01-01 PROCEDURE — 84132 ASSAY OF SERUM POTASSIUM: CPT

## 2025-01-01 PROCEDURE — 36430 TRANSFUSION BLD/BLD COMPNT: CPT

## 2025-01-01 PROCEDURE — 83605 ASSAY OF LACTIC ACID: CPT

## 2025-01-01 PROCEDURE — 76705 ECHO EXAM OF ABDOMEN: CPT | Mod: 26

## 2025-01-01 RX ORDER — ENOXAPARIN SODIUM 60 MG/.6ML
40 INJECTION INTRAVENOUS; SUBCUTANEOUS
Refills: 0 | Status: DISCONTINUED | OUTPATIENT
Start: 2025-01-01 | End: 2025-01-01

## 2025-01-01 RX ORDER — HYDROMORPHONE HCL 4 MG
2 TABLET ORAL
Refills: 0 | Status: DISCONTINUED | OUTPATIENT
Start: 2025-01-01 | End: 2025-01-01

## 2025-01-01 RX ORDER — ACETAMINOPHEN 80 MG/.8ML
1000 SOLUTION/ DROPS ORAL ONCE
Refills: 0 | Status: COMPLETED | OUTPATIENT
Start: 2025-01-01 | End: 2025-01-01

## 2025-01-01 RX ORDER — BROMOCRIPTINE MESYLATE 2.5 MG
10 TABLET ORAL EVERY 8 HOURS
Refills: 0 | Status: DISCONTINUED | OUTPATIENT
Start: 2025-01-01 | End: 2025-01-01

## 2025-01-01 RX ORDER — SODIUM CHLORIDE 9 MG/ML
500 INJECTION, SOLUTION INTRAMUSCULAR; INTRAVENOUS; SUBCUTANEOUS ONCE
Refills: 0 | Status: COMPLETED | OUTPATIENT
Start: 2025-01-01 | End: 2025-01-01

## 2025-01-01 RX ORDER — CARVEDILOL 25 MG/1
12.5 TABLET, FILM COATED ORAL ONCE
Refills: 0 | Status: COMPLETED | OUTPATIENT
Start: 2025-01-01 | End: 2025-01-01

## 2025-01-01 RX ORDER — VALSARTAN 80 MG/1
160 TABLET ORAL
Refills: 0 | Status: DISCONTINUED | OUTPATIENT
Start: 2025-01-01 | End: 2025-01-01

## 2025-01-01 RX ORDER — CARVEDILOL 25 MG/1
12.5 TABLET, FILM COATED ORAL EVERY 12 HOURS
Refills: 0 | Status: DISCONTINUED | OUTPATIENT
Start: 2025-01-01 | End: 2025-01-01

## 2025-01-01 RX ORDER — GINKGO BILOBA 40 MG
3 CAPSULE ORAL AT BEDTIME
Refills: 0 | Status: DISCONTINUED | OUTPATIENT
Start: 2025-01-01 | End: 2025-01-01

## 2025-01-01 RX ORDER — HYDROMORPHONE HCL 4 MG
2 TABLET ORAL
Qty: 100 | Refills: 0 | Status: DISCONTINUED | OUTPATIENT
Start: 2025-01-01 | End: 2025-01-01

## 2025-01-01 RX ORDER — INSULIN LISPRO 100/ML
VIAL (ML) SUBCUTANEOUS EVERY 6 HOURS
Refills: 0 | Status: DISCONTINUED | OUTPATIENT
Start: 2025-01-01 | End: 2025-01-01

## 2025-01-01 RX ORDER — LORAZEPAM 1 MG/1
2 TABLET ORAL ONCE
Refills: 0 | Status: DISCONTINUED | OUTPATIENT
Start: 2025-01-01 | End: 2025-01-01

## 2025-01-01 RX ORDER — ALBUTEROL SULFATE 90 UG/1
2.5 INHALANT RESPIRATORY (INHALATION) EVERY 6 HOURS
Refills: 0 | Status: DISCONTINUED | OUTPATIENT
Start: 2025-01-01 | End: 2025-01-01

## 2025-01-01 RX ORDER — VALSARTAN 80 MG/1
160 TABLET ORAL DAILY
Refills: 0 | Status: DISCONTINUED | OUTPATIENT
Start: 2025-01-01 | End: 2025-01-01

## 2025-01-01 RX ORDER — RAMIPRIL 5 MG/1
1 CAPSULE ORAL
Refills: 0 | DISCHARGE

## 2025-01-01 RX ORDER — NYSTATIN TOPICAL POWDER 100000 U/G
1 POWDER TOPICAL
Refills: 0 | Status: DISCONTINUED | OUTPATIENT
Start: 2025-01-01 | End: 2025-01-01

## 2025-01-01 RX ORDER — CARVEDILOL 25 MG/1
6.25 TABLET, FILM COATED ORAL EVERY 12 HOURS
Refills: 0 | Status: DISCONTINUED | OUTPATIENT
Start: 2025-01-01 | End: 2025-01-01

## 2025-01-01 RX ORDER — CARVEDILOL 25 MG/1
6.25 TABLET, FILM COATED ORAL
Refills: 0 | Status: DISCONTINUED | OUTPATIENT
Start: 2025-01-01 | End: 2025-01-01

## 2025-01-01 RX ORDER — POVIDONE IODINE USP, 10% W/W 10 MG/ML
1 SWAB TOPICAL ONCE
Refills: 0 | Status: DISCONTINUED | OUTPATIENT
Start: 2025-01-01 | End: 2025-01-01

## 2025-01-01 RX ORDER — DEXTROSE MONOHYDRATE 25 G/50ML
25 INJECTION, SOLUTION INTRAVENOUS ONCE
Refills: 0 | Status: DISCONTINUED | OUTPATIENT
Start: 2025-01-01 | End: 2025-01-01

## 2025-01-01 RX ORDER — MODAFINIL 100 MG/1
200 TABLET ORAL DAILY
Refills: 0 | Status: DISCONTINUED | OUTPATIENT
Start: 2025-01-01 | End: 2025-01-01

## 2025-01-01 RX ORDER — FENTANYL 75 UG/H
25 PATCH, EXTENDED RELEASE TRANSDERMAL
Refills: 0 | Status: DISCONTINUED | OUTPATIENT
Start: 2025-01-01 | End: 2025-01-01

## 2025-01-01 RX ORDER — ACETYLCYSTEINE 200 MG/ML
4 VIAL (ML) MISCELLANEOUS EVERY 6 HOURS
Refills: 0 | Status: DISCONTINUED | OUTPATIENT
Start: 2025-01-01 | End: 2025-01-01

## 2025-01-01 RX ORDER — SODIUM CHLORIDE 9 MG/ML
250 INJECTION, SOLUTION INTRAMUSCULAR; INTRAVENOUS; SUBCUTANEOUS ONCE
Refills: 0 | Status: COMPLETED | OUTPATIENT
Start: 2025-01-01 | End: 2025-01-01

## 2025-01-01 RX ORDER — HYDRALAZINE HYDROCHLORIDE 10 MG/1
50 TABLET ORAL EVERY 8 HOURS
Refills: 0 | Status: DISCONTINUED | OUTPATIENT
Start: 2025-01-01 | End: 2025-01-01

## 2025-01-01 RX ORDER — HYDRALAZINE HYDROCHLORIDE 10 MG/1
5 TABLET ORAL ONCE
Refills: 0 | Status: COMPLETED | OUTPATIENT
Start: 2025-01-01 | End: 2025-01-01

## 2025-01-01 RX ORDER — HYDRALAZINE HYDROCHLORIDE 10 MG/1
25 TABLET ORAL EVERY 8 HOURS
Refills: 0 | Status: DISCONTINUED | OUTPATIENT
Start: 2025-01-01 | End: 2025-01-01

## 2025-01-01 RX ORDER — HYDRALAZINE HYDROCHLORIDE 10 MG/1
75 TABLET ORAL EVERY 8 HOURS
Refills: 0 | Status: DISCONTINUED | OUTPATIENT
Start: 2025-01-01 | End: 2025-01-01

## 2025-01-01 RX ORDER — ACETAMINOPHEN 80 MG/.8ML
650 SOLUTION/ DROPS ORAL EVERY 6 HOURS
Refills: 0 | Status: DISCONTINUED | OUTPATIENT
Start: 2025-01-01 | End: 2025-01-01

## 2025-01-01 RX ORDER — IBUPROFEN 200 MG
400 TABLET ORAL EVERY 6 HOURS
Refills: 0 | Status: DISCONTINUED | OUTPATIENT
Start: 2025-01-01 | End: 2025-01-01

## 2025-01-01 RX ORDER — DEXTROSE MONOHYDRATE 25 G/50ML
12.5 INJECTION, SOLUTION INTRAVENOUS ONCE
Refills: 0 | Status: DISCONTINUED | OUTPATIENT
Start: 2025-01-01 | End: 2025-01-01

## 2025-01-01 RX ORDER — LEVETIRACETAM 100 MG/ML
500 SOLUTION ORAL EVERY 12 HOURS
Refills: 0 | Status: DISCONTINUED | OUTPATIENT
Start: 2025-01-01 | End: 2025-01-01

## 2025-01-01 RX ORDER — DEXTROSE MONOHYDRATE 25 G/50ML
15 INJECTION, SOLUTION INTRAVENOUS ONCE
Refills: 0 | Status: DISCONTINUED | OUTPATIENT
Start: 2025-01-01 | End: 2025-01-01

## 2025-01-01 RX ORDER — MODAFINIL 100 MG/1
100 TABLET ORAL DAILY
Refills: 0 | Status: DISCONTINUED | OUTPATIENT
Start: 2025-01-01 | End: 2025-01-01

## 2025-01-01 RX ORDER — AMANTADINE HYDROCHLORIDE 100 MG/1
100 CAPSULE ORAL
Refills: 0 | Status: DISCONTINUED | OUTPATIENT
Start: 2025-01-01 | End: 2025-01-01

## 2025-01-01 RX ORDER — VALSARTAN 80 MG/1
80 TABLET ORAL
Refills: 0 | Status: DISCONTINUED | OUTPATIENT
Start: 2025-01-01 | End: 2025-01-01

## 2025-01-01 RX ORDER — THIAMINE HYDROCHLORIDE 100 MG/ML
100 INJECTION, SOLUTION INTRAMUSCULAR; INTRAVENOUS DAILY
Refills: 0 | Status: DISCONTINUED | OUTPATIENT
Start: 2025-01-01 | End: 2025-01-01

## 2025-01-01 RX ORDER — BROMOCRIPTINE MESYLATE 2.5 MG
5 TABLET ORAL EVERY 8 HOURS
Refills: 0 | Status: DISCONTINUED | OUTPATIENT
Start: 2025-01-01 | End: 2025-01-01

## 2025-01-01 RX ORDER — LABETALOL HCL 300 MG/1
10 TABLET, FILM COATED ORAL EVERY 4 HOURS
Refills: 0 | Status: DISCONTINUED | OUTPATIENT
Start: 2025-01-01 | End: 2025-01-01

## 2025-01-01 RX ORDER — HYDROMORPHONE HCL 4 MG
2 TABLET ORAL ONCE
Refills: 0 | Status: DISCONTINUED | OUTPATIENT
Start: 2025-01-01 | End: 2025-01-01

## 2025-01-01 RX ORDER — LABETALOL HCL 300 MG/1
10 TABLET, FILM COATED ORAL
Refills: 0 | Status: DISCONTINUED | OUTPATIENT
Start: 2025-01-01 | End: 2025-01-01

## 2025-01-01 RX ORDER — CHLORHEXIDINE GLUCONATE 1.2 MG/ML
1 RINSE ORAL ONCE
Refills: 0 | Status: DISCONTINUED | OUTPATIENT
Start: 2025-01-01 | End: 2025-01-01

## 2025-01-01 RX ORDER — METOCLOPRAMIDE 10 MG/1
5 TABLET ORAL ONCE
Refills: 0 | Status: COMPLETED | OUTPATIENT
Start: 2025-01-01 | End: 2025-01-01

## 2025-01-01 RX ORDER — LABETALOL HCL 300 MG/1
10 TABLET, FILM COATED ORAL EVERY 6 HOURS
Refills: 0 | Status: DISCONTINUED | OUTPATIENT
Start: 2025-01-01 | End: 2025-01-01

## 2025-01-01 RX ORDER — CHLORHEXIDINE GLUCONATE 1.2 MG/ML
1 RINSE ORAL EVERY 12 HOURS
Refills: 0 | Status: COMPLETED | OUTPATIENT
Start: 2025-01-01 | End: 2025-01-01

## 2025-01-01 RX ORDER — LORAZEPAM 1 MG/1
2 TABLET ORAL
Refills: 0 | Status: DISCONTINUED | OUTPATIENT
Start: 2025-01-01 | End: 2025-01-01

## 2025-01-01 RX ORDER — SODIUM CHLORIDE 9 MG/ML
1000 INJECTION, SOLUTION INTRAVENOUS
Refills: 0 | Status: DISCONTINUED | OUTPATIENT
Start: 2025-01-01 | End: 2025-01-01

## 2025-01-01 RX ORDER — GLUCAGON INJECTION, SOLUTION 0.5 MG/.1ML
1 INJECTION, SOLUTION SUBCUTANEOUS ONCE
Refills: 0 | Status: DISCONTINUED | OUTPATIENT
Start: 2025-01-01 | End: 2025-01-01

## 2025-01-01 RX ORDER — GLYCOPYRROLATE 0.2 MG/ML
0.4 INJECTION, SOLUTION INTRAMUSCULAR; INTRAVENOUS
Refills: 0 | Status: DISCONTINUED | OUTPATIENT
Start: 2025-01-01 | End: 2025-01-01

## 2025-01-01 RX ORDER — SODIUM CHLORIDE 9 MG/ML
1000 INJECTION, SOLUTION INTRAMUSCULAR; INTRAVENOUS; SUBCUTANEOUS
Refills: 0 | Status: DISCONTINUED | OUTPATIENT
Start: 2025-01-01 | End: 2025-01-01

## 2025-01-01 RX ORDER — HYDRALAZINE HYDROCHLORIDE 10 MG/1
10 TABLET ORAL ONCE
Refills: 0 | Status: COMPLETED | OUTPATIENT
Start: 2025-01-01 | End: 2025-01-01

## 2025-01-01 RX ADMIN — ACETAMINOPHEN 650 MILLIGRAM(S): 80 SOLUTION/ DROPS ORAL at 15:07

## 2025-01-01 RX ADMIN — HYDRALAZINE HYDROCHLORIDE 5 MILLIGRAM(S): 10 TABLET ORAL at 04:10

## 2025-01-01 RX ADMIN — CHLORHEXIDINE GLUCONATE 15 MILLILITER(S): 1.2 RINSE ORAL at 17:16

## 2025-01-01 RX ADMIN — ENOXAPARIN SODIUM 40 MILLIGRAM(S): 60 INJECTION INTRAVENOUS; SUBCUTANEOUS at 21:29

## 2025-01-01 RX ADMIN — ACETAMINOPHEN 1000 MILLIGRAM(S): 80 SOLUTION/ DROPS ORAL at 23:00

## 2025-01-01 RX ADMIN — Medication 400 MILLIGRAM(S): at 15:18

## 2025-01-01 RX ADMIN — HYDRALAZINE HYDROCHLORIDE 25 MILLIGRAM(S): 10 TABLET ORAL at 16:05

## 2025-01-01 RX ADMIN — Medication 10 MILLIGRAM(S): at 05:59

## 2025-01-01 RX ADMIN — Medication 1 TABLET(S): at 12:15

## 2025-01-01 RX ADMIN — ALBUTEROL SULFATE 2.5 MILLIGRAM(S): 90 INHALANT RESPIRATORY (INHALATION) at 08:38

## 2025-01-01 RX ADMIN — ACETAMINOPHEN 400 MILLIGRAM(S): 80 SOLUTION/ DROPS ORAL at 18:17

## 2025-01-01 RX ADMIN — Medication 5 MILLIGRAM(S): at 14:07

## 2025-01-01 RX ADMIN — LABETALOL HCL 10 MILLIGRAM(S): 300 TABLET, FILM COATED ORAL at 01:03

## 2025-01-01 RX ADMIN — Medication 1 TABLET(S): at 12:05

## 2025-01-01 RX ADMIN — VALSARTAN 160 MILLIGRAM(S): 80 TABLET ORAL at 12:09

## 2025-01-01 RX ADMIN — ATORVASTATIN CALCIUM 40 MILLIGRAM(S): 40 TABLET, FILM COATED ORAL at 21:33

## 2025-01-01 RX ADMIN — ACETAMINOPHEN 650 MILLIGRAM(S): 80 SOLUTION/ DROPS ORAL at 16:34

## 2025-01-01 RX ADMIN — Medication 4 MILLILITER(S): at 15:27

## 2025-01-01 RX ADMIN — ACETAMINOPHEN 650 MILLIGRAM(S): 80 SOLUTION/ DROPS ORAL at 23:20

## 2025-01-01 RX ADMIN — ACETAMINOPHEN 1000 MILLIGRAM(S): 80 SOLUTION/ DROPS ORAL at 04:27

## 2025-01-01 RX ADMIN — CHLORHEXIDINE GLUCONATE 1 APPLICATION(S): 1.2 RINSE ORAL at 05:17

## 2025-01-01 RX ADMIN — AMANTADINE HYDROCHLORIDE 100 MILLIGRAM(S): 100 CAPSULE ORAL at 11:55

## 2025-01-01 RX ADMIN — Medication 3 MILLIGRAM(S): at 21:12

## 2025-01-01 RX ADMIN — ALBUTEROL SULFATE 2.5 MILLIGRAM(S): 90 INHALANT RESPIRATORY (INHALATION) at 15:27

## 2025-01-01 RX ADMIN — Medication 10 MILLIGRAM(S): at 13:57

## 2025-01-01 RX ADMIN — Medication 2 MG/HR: at 15:00

## 2025-01-01 RX ADMIN — HYDRALAZINE HYDROCHLORIDE 100 MILLIGRAM(S): 10 TABLET ORAL at 05:59

## 2025-01-01 RX ADMIN — HYDRALAZINE HYDROCHLORIDE 25 MILLIGRAM(S): 10 TABLET ORAL at 22:12

## 2025-01-01 RX ADMIN — HYDRALAZINE HYDROCHLORIDE 10 MILLIGRAM(S): 10 TABLET ORAL at 04:07

## 2025-01-01 RX ADMIN — CHLORHEXIDINE GLUCONATE 1 APPLICATION(S): 1.2 RINSE ORAL at 05:46

## 2025-01-01 RX ADMIN — ACETAMINOPHEN 400 MILLIGRAM(S): 80 SOLUTION/ DROPS ORAL at 04:25

## 2025-01-01 RX ADMIN — ACETAMINOPHEN 1000 MILLIGRAM(S): 80 SOLUTION/ DROPS ORAL at 05:00

## 2025-01-01 RX ADMIN — ACETAMINOPHEN 650 MILLIGRAM(S): 80 SOLUTION/ DROPS ORAL at 22:50

## 2025-01-01 RX ADMIN — FENTANYL 25 MICROGRAM(S): 75 PATCH, EXTENDED RELEASE TRANSDERMAL at 03:04

## 2025-01-01 RX ADMIN — AMANTADINE HYDROCHLORIDE 100 MILLIGRAM(S): 100 CAPSULE ORAL at 05:17

## 2025-01-01 RX ADMIN — Medication 10 MILLIGRAM(S): at 05:17

## 2025-01-01 RX ADMIN — NYSTATIN TOPICAL POWDER 1 APPLICATION(S): 100000 POWDER TOPICAL at 05:06

## 2025-01-01 RX ADMIN — AMANTADINE HYDROCHLORIDE 100 MILLIGRAM(S): 100 CAPSULE ORAL at 05:14

## 2025-01-01 RX ADMIN — CHLORHEXIDINE GLUCONATE 15 MILLILITER(S): 1.2 RINSE ORAL at 05:17

## 2025-01-01 RX ADMIN — ALBUTEROL SULFATE 2.5 MILLIGRAM(S): 90 INHALANT RESPIRATORY (INHALATION) at 19:58

## 2025-01-01 RX ADMIN — ALBUTEROL SULFATE 2.5 MILLIGRAM(S): 90 INHALANT RESPIRATORY (INHALATION) at 15:36

## 2025-01-01 RX ADMIN — Medication 1 MILLIGRAM(S): at 11:02

## 2025-01-01 RX ADMIN — Medication 10 MILLIGRAM(S): at 05:14

## 2025-01-01 RX ADMIN — ENOXAPARIN SODIUM 40 MILLIGRAM(S): 60 INJECTION INTRAVENOUS; SUBCUTANEOUS at 21:03

## 2025-01-01 RX ADMIN — Medication 1 TABLET(S): at 11:29

## 2025-01-01 RX ADMIN — Medication 1 TABLET(S): at 11:50

## 2025-01-01 RX ADMIN — DOXAZOSIN 4 MILLIGRAM(S): 1 TABLET ORAL at 21:29

## 2025-01-01 RX ADMIN — LEVETIRACETAM 750 MILLIGRAM(S): 100 SOLUTION ORAL at 05:14

## 2025-01-01 RX ADMIN — VALSARTAN 160 MILLIGRAM(S): 80 TABLET ORAL at 12:17

## 2025-01-01 RX ADMIN — CHLORHEXIDINE GLUCONATE 1 APPLICATION(S): 1.2 RINSE ORAL at 05:20

## 2025-01-01 RX ADMIN — AMANTADINE HYDROCHLORIDE 100 MILLIGRAM(S): 100 CAPSULE ORAL at 11:06

## 2025-01-01 RX ADMIN — Medication 4 MILLILITER(S): at 15:35

## 2025-01-01 RX ADMIN — LABETALOL HCL 10 MILLIGRAM(S): 300 TABLET, FILM COATED ORAL at 14:03

## 2025-01-01 RX ADMIN — Medication 1 MILLIGRAM(S): at 12:08

## 2025-01-01 RX ADMIN — Medication 1 MILLIGRAM(S): at 11:03

## 2025-01-01 RX ADMIN — HYDRALAZINE HYDROCHLORIDE 25 MILLIGRAM(S): 10 TABLET ORAL at 13:57

## 2025-01-01 RX ADMIN — DOXAZOSIN 4 MILLIGRAM(S): 1 TABLET ORAL at 21:17

## 2025-01-01 RX ADMIN — Medication 2: at 17:45

## 2025-01-01 RX ADMIN — Medication 10 MILLIGRAM(S): at 05:26

## 2025-01-01 RX ADMIN — HYDRALAZINE HYDROCHLORIDE 25 MILLIGRAM(S): 10 TABLET ORAL at 21:03

## 2025-01-01 RX ADMIN — PANTOPRAZOLE 40 MILLIGRAM(S): 40 TABLET, DELAYED RELEASE ORAL at 12:08

## 2025-01-01 RX ADMIN — CHLORHEXIDINE GLUCONATE 1 APPLICATION(S): 1.2 RINSE ORAL at 05:27

## 2025-01-01 RX ADMIN — Medication 4: at 17:34

## 2025-01-01 RX ADMIN — Medication 10 MILLIGRAM(S): at 05:05

## 2025-01-01 RX ADMIN — MODAFINIL 100 MILLIGRAM(S): 100 TABLET ORAL at 11:30

## 2025-01-01 RX ADMIN — CHLORHEXIDINE GLUCONATE 15 MILLILITER(S): 1.2 RINSE ORAL at 18:13

## 2025-01-01 RX ADMIN — CARVEDILOL 6.25 MILLIGRAM(S): 25 TABLET, FILM COATED ORAL at 08:24

## 2025-01-01 RX ADMIN — ALBUTEROL SULFATE 2.5 MILLIGRAM(S): 90 INHALANT RESPIRATORY (INHALATION) at 08:50

## 2025-01-01 RX ADMIN — SODIUM CHLORIDE 2000 MILLILITER(S): 9 INJECTION, SOLUTION INTRAMUSCULAR; INTRAVENOUS; SUBCUTANEOUS at 22:36

## 2025-01-01 RX ADMIN — HYDRALAZINE HYDROCHLORIDE 10 MILLIGRAM(S): 10 TABLET ORAL at 21:05

## 2025-01-01 RX ADMIN — Medication 2: at 11:57

## 2025-01-01 RX ADMIN — ENOXAPARIN SODIUM 40 MILLIGRAM(S): 60 INJECTION INTRAVENOUS; SUBCUTANEOUS at 22:13

## 2025-01-01 RX ADMIN — HYDRALAZINE HYDROCHLORIDE 25 MILLIGRAM(S): 10 TABLET ORAL at 05:31

## 2025-01-01 RX ADMIN — CHLORHEXIDINE GLUCONATE 15 MILLILITER(S): 1.2 RINSE ORAL at 17:15

## 2025-01-01 RX ADMIN — FENTANYL 25 MICROGRAM(S): 75 PATCH, EXTENDED RELEASE TRANSDERMAL at 03:13

## 2025-01-01 RX ADMIN — NYSTATIN TOPICAL POWDER 1 APPLICATION(S): 100000 POWDER TOPICAL at 05:44

## 2025-01-01 RX ADMIN — LABETALOL HCL 10 MILLIGRAM(S): 300 TABLET, FILM COATED ORAL at 10:51

## 2025-01-01 RX ADMIN — THIAMINE HYDROCHLORIDE 100 MILLIGRAM(S): 100 INJECTION, SOLUTION INTRAMUSCULAR; INTRAVENOUS at 11:51

## 2025-01-01 RX ADMIN — Medication 2 MILLIGRAM(S): at 15:30

## 2025-01-01 RX ADMIN — ALBUTEROL SULFATE 2.5 MILLIGRAM(S): 90 INHALANT RESPIRATORY (INHALATION) at 03:09

## 2025-01-01 RX ADMIN — ATORVASTATIN CALCIUM 40 MILLIGRAM(S): 40 TABLET, FILM COATED ORAL at 21:17

## 2025-01-01 RX ADMIN — Medication 1: at 12:07

## 2025-01-01 RX ADMIN — THIAMINE HYDROCHLORIDE 100 MILLIGRAM(S): 100 INJECTION, SOLUTION INTRAMUSCULAR; INTRAVENOUS at 11:03

## 2025-01-01 RX ADMIN — LEVETIRACETAM 500 MILLIGRAM(S): 100 SOLUTION ORAL at 17:27

## 2025-01-01 RX ADMIN — HYDRALAZINE HYDROCHLORIDE 10 MILLIGRAM(S): 10 TABLET ORAL at 19:10

## 2025-01-01 RX ADMIN — HYDRALAZINE HYDROCHLORIDE 25 MILLIGRAM(S): 10 TABLET ORAL at 05:05

## 2025-01-01 RX ADMIN — Medication 10 MILLIGRAM(S): at 21:41

## 2025-01-01 RX ADMIN — NYSTATIN TOPICAL POWDER 1 APPLICATION(S): 100000 POWDER TOPICAL at 18:13

## 2025-01-01 RX ADMIN — MODAFINIL 100 MILLIGRAM(S): 100 TABLET ORAL at 11:03

## 2025-01-01 RX ADMIN — ALBUTEROL SULFATE 2.5 MILLIGRAM(S): 90 INHALANT RESPIRATORY (INHALATION) at 04:04

## 2025-01-01 RX ADMIN — NYSTATIN TOPICAL POWDER 1 APPLICATION(S): 100000 POWDER TOPICAL at 05:03

## 2025-01-01 RX ADMIN — Medication 10 MILLIGRAM(S): at 05:03

## 2025-01-01 RX ADMIN — CARVEDILOL 25 MILLIGRAM(S): 25 TABLET, FILM COATED ORAL at 05:59

## 2025-01-01 RX ADMIN — CHLORHEXIDINE GLUCONATE 1 APPLICATION(S): 1.2 RINSE ORAL at 05:02

## 2025-01-01 RX ADMIN — SODIUM CHLORIDE 500 MILLILITER(S): 9 INJECTION, SOLUTION INTRAMUSCULAR; INTRAVENOUS; SUBCUTANEOUS at 14:10

## 2025-01-01 RX ADMIN — CARVEDILOL 6.25 MILLIGRAM(S): 25 TABLET, FILM COATED ORAL at 08:55

## 2025-01-01 RX ADMIN — NYSTATIN TOPICAL POWDER 1 APPLICATION(S): 100000 POWDER TOPICAL at 17:17

## 2025-01-01 RX ADMIN — LEVETIRACETAM 500 MILLIGRAM(S): 100 SOLUTION ORAL at 18:01

## 2025-01-01 RX ADMIN — CHLORHEXIDINE GLUCONATE 15 MILLILITER(S): 1.2 RINSE ORAL at 05:30

## 2025-01-01 RX ADMIN — VALSARTAN 80 MILLIGRAM(S): 80 TABLET ORAL at 11:03

## 2025-01-01 RX ADMIN — CARVEDILOL 6.25 MILLIGRAM(S): 25 TABLET, FILM COATED ORAL at 07:47

## 2025-01-01 RX ADMIN — Medication 1 TABLET(S): at 11:04

## 2025-01-01 RX ADMIN — THIAMINE HYDROCHLORIDE 100 MILLIGRAM(S): 100 INJECTION, SOLUTION INTRAMUSCULAR; INTRAVENOUS at 12:15

## 2025-01-01 RX ADMIN — FENTANYL 25 MICROGRAM(S): 75 PATCH, EXTENDED RELEASE TRANSDERMAL at 12:34

## 2025-01-01 RX ADMIN — ACETAMINOPHEN 400 MILLIGRAM(S): 80 SOLUTION/ DROPS ORAL at 22:00

## 2025-01-01 RX ADMIN — LEVETIRACETAM 500 MILLIGRAM(S): 100 SOLUTION ORAL at 05:43

## 2025-01-01 RX ADMIN — Medication 400 MILLIGRAM(S): at 13:57

## 2025-01-01 RX ADMIN — ACETAMINOPHEN 400 MILLIGRAM(S): 80 SOLUTION/ DROPS ORAL at 05:16

## 2025-01-01 RX ADMIN — LABETALOL HCL 10 MILLIGRAM(S): 300 TABLET, FILM COATED ORAL at 10:05

## 2025-01-01 RX ADMIN — ENOXAPARIN SODIUM 40 MILLIGRAM(S): 60 INJECTION INTRAVENOUS; SUBCUTANEOUS at 23:02

## 2025-01-01 RX ADMIN — Medication 2 MILLIGRAM(S): at 15:04

## 2025-01-01 RX ADMIN — NYSTATIN TOPICAL POWDER 1 APPLICATION(S): 100000 POWDER TOPICAL at 17:48

## 2025-01-01 RX ADMIN — Medication 10 MILLIGRAM(S): at 06:17

## 2025-01-01 RX ADMIN — ACETAMINOPHEN 400 MILLIGRAM(S): 80 SOLUTION/ DROPS ORAL at 15:12

## 2025-01-01 RX ADMIN — HYDRALAZINE HYDROCHLORIDE 10 MILLIGRAM(S): 10 TABLET ORAL at 11:13

## 2025-01-01 RX ADMIN — Medication 10 MILLIGRAM(S): at 13:24

## 2025-01-01 RX ADMIN — Medication 4 MILLILITER(S): at 03:09

## 2025-01-01 RX ADMIN — HYDRALAZINE HYDROCHLORIDE 50 MILLIGRAM(S): 10 TABLET ORAL at 13:36

## 2025-01-01 RX ADMIN — CARVEDILOL 12.5 MILLIGRAM(S): 25 TABLET, FILM COATED ORAL at 19:30

## 2025-01-01 RX ADMIN — Medication 400 MILLIGRAM(S): at 03:00

## 2025-01-01 RX ADMIN — Medication 1 TABLET(S): at 11:56

## 2025-01-01 RX ADMIN — AMANTADINE HYDROCHLORIDE 100 MILLIGRAM(S): 100 CAPSULE ORAL at 05:46

## 2025-01-01 RX ADMIN — PANTOPRAZOLE 40 MILLIGRAM(S): 40 TABLET, DELAYED RELEASE ORAL at 11:03

## 2025-01-01 RX ADMIN — Medication 400 MILLIGRAM(S): at 12:25

## 2025-01-01 RX ADMIN — Medication 1 MILLIGRAM(S): at 11:30

## 2025-01-01 RX ADMIN — VALSARTAN 160 MILLIGRAM(S): 80 TABLET ORAL at 11:57

## 2025-01-01 RX ADMIN — Medication 10 MILLIGRAM(S): at 05:31

## 2025-01-01 RX ADMIN — CHLORHEXIDINE GLUCONATE 15 MILLILITER(S): 1.2 RINSE ORAL at 05:14

## 2025-01-01 RX ADMIN — Medication 10 MILLIGRAM(S): at 14:58

## 2025-01-01 RX ADMIN — CHLORHEXIDINE GLUCONATE 1 APPLICATION(S): 1.2 RINSE ORAL at 05:35

## 2025-01-01 RX ADMIN — LABETALOL HCL 10 MILLIGRAM(S): 300 TABLET, FILM COATED ORAL at 02:05

## 2025-01-01 RX ADMIN — Medication 400 MILLIGRAM(S): at 14:57

## 2025-01-01 RX ADMIN — DOXAZOSIN 4 MILLIGRAM(S): 1 TABLET ORAL at 21:12

## 2025-01-01 RX ADMIN — Medication 10 MILLIGRAM(S): at 05:43

## 2025-01-01 RX ADMIN — Medication 4 MILLILITER(S): at 03:39

## 2025-01-01 RX ADMIN — NYSTATIN TOPICAL POWDER 1 APPLICATION(S): 100000 POWDER TOPICAL at 17:04

## 2025-01-01 RX ADMIN — Medication 2: at 12:08

## 2025-01-01 RX ADMIN — DOXAZOSIN 4 MILLIGRAM(S): 1 TABLET ORAL at 21:11

## 2025-01-01 RX ADMIN — PANTOPRAZOLE 40 MILLIGRAM(S): 40 TABLET, DELAYED RELEASE ORAL at 11:51

## 2025-01-01 RX ADMIN — Medication 1 MILLIGRAM(S): at 11:56

## 2025-01-01 RX ADMIN — DOXAZOSIN 4 MILLIGRAM(S): 1 TABLET ORAL at 22:12

## 2025-01-01 RX ADMIN — Medication 10 MILLIGRAM(S): at 15:07

## 2025-01-01 RX ADMIN — CARVEDILOL 6.25 MILLIGRAM(S): 25 TABLET, FILM COATED ORAL at 17:15

## 2025-01-01 RX ADMIN — AMANTADINE HYDROCHLORIDE 100 MILLIGRAM(S): 100 CAPSULE ORAL at 12:57

## 2025-01-01 RX ADMIN — PANTOPRAZOLE 40 MILLIGRAM(S): 40 TABLET, DELAYED RELEASE ORAL at 11:02

## 2025-01-01 RX ADMIN — PANTOPRAZOLE 40 MILLIGRAM(S): 40 TABLET, DELAYED RELEASE ORAL at 12:04

## 2025-01-01 RX ADMIN — HYDRALAZINE HYDROCHLORIDE 10 MILLIGRAM(S): 10 TABLET ORAL at 03:25

## 2025-01-01 RX ADMIN — Medication 4 MILLILITER(S): at 03:29

## 2025-01-01 RX ADMIN — Medication 400 MILLIGRAM(S): at 02:38

## 2025-01-01 RX ADMIN — Medication 10 MILLIGRAM(S): at 21:03

## 2025-01-01 RX ADMIN — THIAMINE HYDROCHLORIDE 100 MILLIGRAM(S): 100 INJECTION, SOLUTION INTRAMUSCULAR; INTRAVENOUS at 11:30

## 2025-01-01 RX ADMIN — Medication 1 MILLIGRAM(S): at 12:05

## 2025-01-01 RX ADMIN — SODIUM CHLORIDE 250 MILLILITER(S): 9 INJECTION, SOLUTION INTRAMUSCULAR; INTRAVENOUS; SUBCUTANEOUS at 16:19

## 2025-01-01 RX ADMIN — Medication 4 MILLILITER(S): at 09:05

## 2025-01-01 RX ADMIN — ATORVASTATIN CALCIUM 40 MILLIGRAM(S): 40 TABLET, FILM COATED ORAL at 21:11

## 2025-01-01 RX ADMIN — CHLORHEXIDINE GLUCONATE 1 APPLICATION(S): 1.2 RINSE ORAL at 17:19

## 2025-01-01 RX ADMIN — LABETALOL HCL 10 MILLIGRAM(S): 300 TABLET, FILM COATED ORAL at 04:25

## 2025-01-01 RX ADMIN — AMANTADINE HYDROCHLORIDE 100 MILLIGRAM(S): 100 CAPSULE ORAL at 14:12

## 2025-01-01 RX ADMIN — VALSARTAN 160 MILLIGRAM(S): 80 TABLET ORAL at 11:31

## 2025-01-01 RX ADMIN — CHLORHEXIDINE GLUCONATE 15 MILLILITER(S): 1.2 RINSE ORAL at 05:03

## 2025-01-01 RX ADMIN — FENTANYL 25 MICROGRAM(S): 75 PATCH, EXTENDED RELEASE TRANSDERMAL at 19:55

## 2025-01-01 RX ADMIN — HYDRALAZINE HYDROCHLORIDE 50 MILLIGRAM(S): 10 TABLET ORAL at 05:18

## 2025-01-01 RX ADMIN — MODAFINIL 100 MILLIGRAM(S): 100 TABLET ORAL at 12:08

## 2025-01-01 RX ADMIN — ATORVASTATIN CALCIUM 40 MILLIGRAM(S): 40 TABLET, FILM COATED ORAL at 21:12

## 2025-01-01 RX ADMIN — CHLORHEXIDINE GLUCONATE 15 MILLILITER(S): 1.2 RINSE ORAL at 05:59

## 2025-01-01 RX ADMIN — HYDRALAZINE HYDROCHLORIDE 10 MILLIGRAM(S): 10 TABLET ORAL at 21:00

## 2025-01-01 RX ADMIN — CHLORHEXIDINE GLUCONATE 15 MILLILITER(S): 1.2 RINSE ORAL at 05:43

## 2025-01-01 RX ADMIN — PANTOPRAZOLE 40 MILLIGRAM(S): 40 TABLET, DELAYED RELEASE ORAL at 12:15

## 2025-01-01 RX ADMIN — HYDRALAZINE HYDROCHLORIDE 25 MILLIGRAM(S): 10 TABLET ORAL at 21:41

## 2025-01-01 RX ADMIN — SODIUM CHLORIDE 500 MILLILITER(S): 9 INJECTION, SOLUTION INTRAMUSCULAR; INTRAVENOUS; SUBCUTANEOUS at 16:10

## 2025-01-01 RX ADMIN — THIAMINE HYDROCHLORIDE 100 MILLIGRAM(S): 100 INJECTION, SOLUTION INTRAMUSCULAR; INTRAVENOUS at 14:12

## 2025-01-01 RX ADMIN — ALBUTEROL SULFATE 2.5 MILLIGRAM(S): 90 INHALANT RESPIRATORY (INHALATION) at 03:39

## 2025-01-01 RX ADMIN — Medication 1 MILLIGRAM(S): at 11:51

## 2025-01-01 RX ADMIN — CHLORHEXIDINE GLUCONATE 1 APPLICATION(S): 1.2 RINSE ORAL at 06:00

## 2025-01-01 RX ADMIN — Medication 10 MILLIGRAM(S): at 22:12

## 2025-01-01 RX ADMIN — NYSTATIN TOPICAL POWDER 1 APPLICATION(S): 100000 POWDER TOPICAL at 05:28

## 2025-01-01 RX ADMIN — LEVETIRACETAM 750 MILLIGRAM(S): 100 SOLUTION ORAL at 05:59

## 2025-01-01 RX ADMIN — MODAFINIL 100 MILLIGRAM(S): 100 TABLET ORAL at 12:05

## 2025-01-01 RX ADMIN — DOXAZOSIN 4 MILLIGRAM(S): 1 TABLET ORAL at 21:03

## 2025-01-01 RX ADMIN — VALSARTAN 160 MILLIGRAM(S): 80 TABLET ORAL at 12:06

## 2025-01-01 RX ADMIN — Medication 3 MILLIGRAM(S): at 21:41

## 2025-01-01 RX ADMIN — Medication 400 MILLIGRAM(S): at 16:31

## 2025-01-01 RX ADMIN — SODIUM CHLORIDE 1000 MILLILITER(S): 9 INJECTION, SOLUTION INTRAMUSCULAR; INTRAVENOUS; SUBCUTANEOUS at 06:05

## 2025-01-01 RX ADMIN — ACETAMINOPHEN 1000 MILLIGRAM(S): 80 SOLUTION/ DROPS ORAL at 16:00

## 2025-01-01 RX ADMIN — ACETAMINOPHEN 1000 MILLIGRAM(S): 80 SOLUTION/ DROPS ORAL at 19:49

## 2025-01-01 RX ADMIN — HYDRALAZINE HYDROCHLORIDE 25 MILLIGRAM(S): 10 TABLET ORAL at 05:02

## 2025-01-01 RX ADMIN — Medication 4 MILLILITER(S): at 08:37

## 2025-01-01 RX ADMIN — Medication 1 MILLIGRAM(S): at 12:55

## 2025-01-01 RX ADMIN — CARVEDILOL 12.5 MILLIGRAM(S): 25 TABLET, FILM COATED ORAL at 05:17

## 2025-01-01 RX ADMIN — NYSTATIN TOPICAL POWDER 1 APPLICATION(S): 100000 POWDER TOPICAL at 05:31

## 2025-01-01 RX ADMIN — ACETAMINOPHEN 650 MILLIGRAM(S): 80 SOLUTION/ DROPS ORAL at 22:05

## 2025-01-01 RX ADMIN — Medication 5 MILLIGRAM(S): at 05:43

## 2025-01-01 RX ADMIN — ALBUTEROL SULFATE 2.5 MILLIGRAM(S): 90 INHALANT RESPIRATORY (INHALATION) at 09:04

## 2025-01-01 RX ADMIN — HYDRALAZINE HYDROCHLORIDE 10 MILLIGRAM(S): 10 TABLET ORAL at 01:05

## 2025-01-01 RX ADMIN — Medication 1 MILLIGRAM(S): at 12:15

## 2025-01-01 RX ADMIN — Medication 1 TABLET(S): at 11:02

## 2025-01-01 RX ADMIN — FENTANYL 25 MICROGRAM(S): 75 PATCH, EXTENDED RELEASE TRANSDERMAL at 18:55

## 2025-01-01 RX ADMIN — Medication 2 MG/HR: at 14:26

## 2025-01-01 RX ADMIN — GLYCOPYRROLATE 0.4 MILLIGRAM(S): 0.2 INJECTION, SOLUTION INTRAMUSCULAR; INTRAVENOUS at 15:04

## 2025-01-01 RX ADMIN — Medication 5 MILLIGRAM(S): at 21:33

## 2025-01-01 RX ADMIN — Medication 17 GRAM(S): at 12:09

## 2025-01-01 RX ADMIN — CARVEDILOL 6.25 MILLIGRAM(S): 25 TABLET, FILM COATED ORAL at 18:13

## 2025-01-01 RX ADMIN — CHLORHEXIDINE GLUCONATE 1 APPLICATION(S): 1.2 RINSE ORAL at 05:06

## 2025-01-01 RX ADMIN — Medication 4 MILLILITER(S): at 20:07

## 2025-01-01 RX ADMIN — ATORVASTATIN CALCIUM 40 MILLIGRAM(S): 40 TABLET, FILM COATED ORAL at 21:29

## 2025-01-01 RX ADMIN — VALSARTAN 160 MILLIGRAM(S): 80 TABLET ORAL at 11:02

## 2025-01-01 RX ADMIN — DOXAZOSIN 4 MILLIGRAM(S): 1 TABLET ORAL at 21:41

## 2025-01-01 RX ADMIN — CHLORHEXIDINE GLUCONATE 15 MILLILITER(S): 1.2 RINSE ORAL at 17:28

## 2025-01-01 RX ADMIN — CARVEDILOL 6.25 MILLIGRAM(S): 25 TABLET, FILM COATED ORAL at 15:12

## 2025-01-01 RX ADMIN — THIAMINE HYDROCHLORIDE 100 MILLIGRAM(S): 100 INJECTION, SOLUTION INTRAMUSCULAR; INTRAVENOUS at 12:05

## 2025-01-01 RX ADMIN — Medication 1 TABLET(S): at 12:09

## 2025-01-01 RX ADMIN — Medication 3 MILLIGRAM(S): at 21:03

## 2025-01-01 RX ADMIN — LEVETIRACETAM 750 MILLIGRAM(S): 100 SOLUTION ORAL at 17:30

## 2025-01-01 RX ADMIN — ALBUTEROL SULFATE 2.5 MILLIGRAM(S): 90 INHALANT RESPIRATORY (INHALATION) at 08:20

## 2025-01-01 RX ADMIN — DOXAZOSIN 4 MILLIGRAM(S): 1 TABLET ORAL at 21:34

## 2025-01-01 RX ADMIN — SODIUM CHLORIDE 500 MILLILITER(S): 9 INJECTION, SOLUTION INTRAMUSCULAR; INTRAVENOUS; SUBCUTANEOUS at 23:40

## 2025-01-01 RX ADMIN — Medication 4 MILLILITER(S): at 08:20

## 2025-01-01 RX ADMIN — Medication 400 MILLIGRAM(S): at 11:29

## 2025-01-01 RX ADMIN — NYSTATIN TOPICAL POWDER 1 APPLICATION(S): 100000 POWDER TOPICAL at 05:16

## 2025-01-01 RX ADMIN — FENTANYL 25 MICROGRAM(S): 75 PATCH, EXTENDED RELEASE TRANSDERMAL at 11:34

## 2025-01-01 RX ADMIN — THIAMINE HYDROCHLORIDE 100 MILLIGRAM(S): 100 INJECTION, SOLUTION INTRAMUSCULAR; INTRAVENOUS at 12:55

## 2025-01-01 RX ADMIN — Medication 1 TABLET(S): at 12:55

## 2025-01-01 RX ADMIN — LABETALOL HCL 10 MILLIGRAM(S): 300 TABLET, FILM COATED ORAL at 20:05

## 2025-01-01 RX ADMIN — NYSTATIN TOPICAL POWDER 1 APPLICATION(S): 100000 POWDER TOPICAL at 18:55

## 2025-01-01 RX ADMIN — ACETAMINOPHEN 400 MILLIGRAM(S): 80 SOLUTION/ DROPS ORAL at 04:11

## 2025-01-01 RX ADMIN — ALBUTEROL SULFATE 2.5 MILLIGRAM(S): 90 INHALANT RESPIRATORY (INHALATION) at 20:08

## 2025-01-01 RX ADMIN — CARVEDILOL 6.25 MILLIGRAM(S): 25 TABLET, FILM COATED ORAL at 17:50

## 2025-01-01 RX ADMIN — CHLORHEXIDINE GLUCONATE 15 MILLILITER(S): 1.2 RINSE ORAL at 05:27

## 2025-01-01 RX ADMIN — HYDRALAZINE HYDROCHLORIDE 10 MILLIGRAM(S): 10 TABLET ORAL at 01:06

## 2025-01-01 RX ADMIN — VALSARTAN 160 MILLIGRAM(S): 80 TABLET ORAL at 05:15

## 2025-01-01 RX ADMIN — LEVETIRACETAM 500 MILLIGRAM(S): 100 SOLUTION ORAL at 05:17

## 2025-01-01 RX ADMIN — ENOXAPARIN SODIUM 40 MILLIGRAM(S): 60 INJECTION INTRAVENOUS; SUBCUTANEOUS at 18:38

## 2025-01-01 RX ADMIN — Medication 400 MILLIGRAM(S): at 12:44

## 2025-01-01 RX ADMIN — Medication 400 MILLIGRAM(S): at 13:22

## 2025-01-01 RX ADMIN — CHLORHEXIDINE GLUCONATE 1 APPLICATION(S): 1.2 RINSE ORAL at 05:15

## 2025-01-01 RX ADMIN — CARVEDILOL 12.5 MILLIGRAM(S): 25 TABLET, FILM COATED ORAL at 15:13

## 2025-01-01 RX ADMIN — PANTOPRAZOLE 40 MILLIGRAM(S): 40 TABLET, DELAYED RELEASE ORAL at 12:55

## 2025-01-01 RX ADMIN — MODAFINIL 100 MILLIGRAM(S): 100 TABLET ORAL at 12:14

## 2025-01-01 RX ADMIN — Medication 10 MILLIGRAM(S): at 15:11

## 2025-01-01 RX ADMIN — CHLORHEXIDINE GLUCONATE 15 MILLILITER(S): 1.2 RINSE ORAL at 05:15

## 2025-01-01 RX ADMIN — Medication 10 MILLIGRAM(S): at 21:12

## 2025-01-01 RX ADMIN — PANTOPRAZOLE 40 MILLIGRAM(S): 40 TABLET, DELAYED RELEASE ORAL at 11:56

## 2025-01-01 RX ADMIN — CARVEDILOL 6.25 MILLIGRAM(S): 25 TABLET, FILM COATED ORAL at 08:38

## 2025-01-01 RX ADMIN — Medication 10 MILLIGRAM(S): at 05:06

## 2025-01-01 RX ADMIN — NYSTATIN TOPICAL POWDER 1 APPLICATION(S): 100000 POWDER TOPICAL at 17:16

## 2025-01-01 RX ADMIN — Medication 3 MILLIGRAM(S): at 22:12

## 2025-01-01 RX ADMIN — VALSARTAN 80 MILLIGRAM(S): 80 TABLET ORAL at 12:57

## 2025-01-01 RX ADMIN — SENNOSIDES 2 TABLET(S): 8.6 TABLET, FILM COATED ORAL at 21:17

## 2025-01-01 RX ADMIN — CHLORHEXIDINE GLUCONATE 15 MILLILITER(S): 1.2 RINSE ORAL at 17:05

## 2025-01-01 RX ADMIN — CHLORHEXIDINE GLUCONATE 15 MILLILITER(S): 1.2 RINSE ORAL at 05:04

## 2025-01-01 RX ADMIN — LORAZEPAM 2 MILLIGRAM(S): 1 TABLET ORAL at 15:04

## 2025-01-01 RX ADMIN — ENOXAPARIN SODIUM 40 MILLIGRAM(S): 60 INJECTION INTRAVENOUS; SUBCUTANEOUS at 21:41

## 2025-01-01 RX ADMIN — LABETALOL HCL 10 MILLIGRAM(S): 300 TABLET, FILM COATED ORAL at 01:45

## 2025-01-01 RX ADMIN — PANTOPRAZOLE 40 MILLIGRAM(S): 40 TABLET, DELAYED RELEASE ORAL at 11:30

## 2025-01-01 RX ADMIN — Medication 10 MILLIGRAM(S): at 21:11

## 2025-01-01 RX ADMIN — METOCLOPRAMIDE 5 MILLIGRAM(S): 10 TABLET ORAL at 12:20

## 2025-01-01 RX ADMIN — CARVEDILOL 6.25 MILLIGRAM(S): 25 TABLET, FILM COATED ORAL at 08:45

## 2025-01-01 RX ADMIN — CHLORHEXIDINE GLUCONATE 15 MILLILITER(S): 1.2 RINSE ORAL at 17:47

## 2025-01-01 RX ADMIN — THIAMINE HYDROCHLORIDE 100 MILLIGRAM(S): 100 INJECTION, SOLUTION INTRAMUSCULAR; INTRAVENOUS at 11:56

## 2025-01-01 RX ADMIN — CARVEDILOL 12.5 MILLIGRAM(S): 25 TABLET, FILM COATED ORAL at 11:04

## 2025-01-01 RX ADMIN — ACETAMINOPHEN 650 MILLIGRAM(S): 80 SOLUTION/ DROPS ORAL at 22:00

## 2025-01-01 RX ADMIN — ACETAMINOPHEN 650 MILLIGRAM(S): 80 SOLUTION/ DROPS ORAL at 14:07

## 2025-01-01 RX ADMIN — CHLORHEXIDINE GLUCONATE 15 MILLILITER(S): 1.2 RINSE ORAL at 17:30

## 2025-01-01 RX ADMIN — Medication 4 MILLILITER(S): at 19:58

## 2025-01-01 RX ADMIN — ALBUTEROL SULFATE 2.5 MILLIGRAM(S): 90 INHALANT RESPIRATORY (INHALATION) at 03:29

## 2025-01-01 NOTE — PROGRESS NOTE ADULT - CRITICAL CARE ATTENDING COMMENT
My full attention was spent providing medically necessary critical care to the patient with details documented in my note above.   Critical care time spent examining patient, reviewing vitals, labs, medications, imaging and discussing with the team goals of care   The combined critical care time provided to the patient was  60   minutes  This time does not include bedside procedures that are documented separately.

## 2025-01-01 NOTE — PROGRESS NOTE ADULT - SUBJECTIVE AND OBJECTIVE BOX
Chief complaint:   Patient is a 69y old  Male who presents with a chief complaint of ICH (31 Dec 2024 14:10)    HPI:   69 yr old male history of htn, hld , on afib eliquis with prior ischemic stroke 2018, frequent utis, kidney stones, turp, cabg x 2, 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  went to [place], ich, vomited gcs 13 intubated.  Transferred to [facility] for possible SOC.  On arrival patient is sedated on versed, propofol and fent.    MRS 0  ICH score 3  NIHSS 25 (21 Dec 2024 16:13)        24hr EVENTS:      ROS: [ ]  Unable to assess due to mental status   All other systems negative    -----------------------------------------------------------------------------------------------------------------------------------------------------------------------------------  ICU Vital Signs Last 24 Hrs  T(C): 37.1 (01 Jan 2025 08:00), Max: 38.1 (01 Jan 2025 01:00)  T(F): 98.8 (01 Jan 2025 08:00), Max: 100.6 (01 Jan 2025 01:00)  HR: 58 (01 Jan 2025 08:33) (56 - 74)  BP: 110/50 (01 Jan 2025 08:00) (96/52 - 158/69)  BP(mean): 67 (01 Jan 2025 08:00) (64 - 94)  ABP: 82/41 (01 Jan 2025 07:00) (82/41 - 156/53)  ABP(mean): 54 (01 Jan 2025 07:00) (49 - 86)  RR: 16 (01 Jan 2025 08:00) (10 - 21)  SpO2: 94% (01 Jan 2025 08:33) (94% - 98%)    O2 Parameters below as of 01 Jan 2025 08:33  Patient On (Oxygen Delivery Method): ventilator            I&O's Summary    31 Dec 2024 07:01  -  01 Jan 2025 07:00  --------------------------------------------------------  IN: 2190 mL / OUT: 2445 mL / NET: -255 mL        MEDICATIONS  (STANDING):  acetaminophen   IVPB .. 1000 milliGRAM(s) IV Intermittent once  acetylcysteine 10%  Inhalation 4 milliLiter(s) Inhalation every 6 hours  albuterol    0.083% 2.5 milliGRAM(s) Nebulizer every 6 hours  amLODIPine   Tablet 10 milliGRAM(s) Oral daily  atorvastatin 40 milliGRAM(s) Oral at bedtime  carvedilol 25 milliGRAM(s) Oral every 12 hours  chlorhexidine 0.12% Liquid 15 milliLiter(s) Oral Mucosa every 12 hours  chlorhexidine 2% Cloths 1 Application(s) Topical daily  doxazosin 4 milliGRAM(s) Oral at bedtime  enoxaparin Injectable 40 milliGRAM(s) SubCutaneous <User Schedule>  fentaNYL    Injectable 50 MICROGram(s) IV Push once  folic acid 1 milliGRAM(s) Oral daily  hydrALAZINE 100 milliGRAM(s) Oral every 8 hours  insulin lispro (ADMELOG) corrective regimen sliding scale   SubCutaneous every 6 hours  levETIRAcetam   Injectable 750 milliGRAM(s) IV Push every 12 hours  multivitamin 1 Tablet(s) Oral daily  pantoprazole   Suspension 40 milliGRAM(s) Oral daily  polyethylene glycol 3350 17 Gram(s) Oral daily  senna 2 Tablet(s) Oral at bedtime  sodium chloride 0.9%. 1000 milliLiter(s) (50 mL/Hr) IV Continuous <Continuous>  valsartan 160 milliGRAM(s) Oral daily      RESPIRATORY:  Mode: AC/ CMV (Assist Control/ Continuous Mandatory Ventilation)  RR (machine): 16  TV (machine): 500  FiO2: 50  PEEP: 6  ITime: 1  MAP: 9  PIP: 22        IMAGING:   Recent imaging studies were reviewed.    LAB RESULTS:                          12.0   12.55 )-----------( 184      ( 01 Jan 2025 03:27 )             37.6           01-01    147[H]  |  111[H]  |  49.4[H]  ----------------------------<  165[H]  4.2   |  24.0  |  1.34[H]    Ca    8.1[L]      01 Jan 2025 03:27  Phos  3.9     01-01  Mg     2.7     01-01                  -----------------------------------------------------------------------------------------------------------------------------------------------------------------------------------    PHYSICAL EXAM:  General: Calm  HEENT: MMM  Neuro:  -Mental status- No acute distress  -CN- PERRL 3mm, EOMI, tongue midline, face symmetric    CV: RRR  Pulm: clear to auscultation  Abd: Soft, nontender, nondistended  Ext: no noted edema in lower ext  Skin: warm, dry       Chief complaint:   Patient is a 69y old  Male who presents with a chief complaint of ICH (31 Dec 2024 14:10)    HPI:   69 yr old male history of htn, hld , on afib eliquis with prior ischemic stroke 2018, frequent utis, kidney stones, turp, cabg x 2, 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  went to [place], ich, vomited gcs 13 intubated.  Transferred to [facility] for possible SOC.  On arrival patient is sedated on versed, propofol and fent.    MRS 0  ICH score 3  NIHSS 25 (21 Dec 2024 16:13)    24hr EVENTS:  no acute changes    ROS: [ x]  Unable to assess due to mental status   All other systems negative    -----------------------------------------------------------------------------------------------------------------------------------------------------------------------------------  ICU Vital Signs Last 24 Hrs  T(C): 37.1 (01 Jan 2025 08:00), Max: 38.1 (01 Jan 2025 01:00)  T(F): 98.8 (01 Jan 2025 08:00), Max: 100.6 (01 Jan 2025 01:00)  HR: 58 (01 Jan 2025 08:33) (56 - 74)  BP: 110/50 (01 Jan 2025 08:00) (96/52 - 158/69)  BP(mean): 67 (01 Jan 2025 08:00) (64 - 94)  ABP: 82/41 (01 Jan 2025 07:00) (82/41 - 156/53)  ABP(mean): 54 (01 Jan 2025 07:00) (49 - 86)  RR: 16 (01 Jan 2025 08:00) (10 - 21)  SpO2: 94% (01 Jan 2025 08:33) (94% - 98%)    O2 Parameters below as of 01 Jan 2025 08:33  Patient On (Oxygen Delivery Method): ventilator        I&O's Summary    31 Dec 2024 07:01  -  01 Jan 2025 07:00  --------------------------------------------------------  IN: 2190 mL / OUT: 2445 mL / NET: -255 mL        MEDICATIONS  (STANDING):  acetaminophen   IVPB .. 1000 milliGRAM(s) IV Intermittent once  acetylcysteine 10%  Inhalation 4 milliLiter(s) Inhalation every 6 hours  albuterol    0.083% 2.5 milliGRAM(s) Nebulizer every 6 hours  amLODIPine   Tablet 10 milliGRAM(s) Oral daily  atorvastatin 40 milliGRAM(s) Oral at bedtime  carvedilol 25 milliGRAM(s) Oral every 12 hours  chlorhexidine 0.12% Liquid 15 milliLiter(s) Oral Mucosa every 12 hours  chlorhexidine 2% Cloths 1 Application(s) Topical daily  doxazosin 4 milliGRAM(s) Oral at bedtime  enoxaparin Injectable 40 milliGRAM(s) SubCutaneous <User Schedule>  fentaNYL    Injectable 50 MICROGram(s) IV Push once  folic acid 1 milliGRAM(s) Oral daily  hydrALAZINE 100 milliGRAM(s) Oral every 8 hours  insulin lispro (ADMELOG) corrective regimen sliding scale   SubCutaneous every 6 hours  levETIRAcetam   Injectable 750 milliGRAM(s) IV Push every 12 hours  multivitamin 1 Tablet(s) Oral daily  pantoprazole   Suspension 40 milliGRAM(s) Oral daily  polyethylene glycol 3350 17 Gram(s) Oral daily  senna 2 Tablet(s) Oral at bedtime  sodium chloride 0.9%. 1000 milliLiter(s) (50 mL/Hr) IV Continuous <Continuous>  valsartan 160 milliGRAM(s) Oral daily      RESPIRATORY:  Mode: AC/ CMV (Assist Control/ Continuous Mandatory Ventilation)  RR (machine): 16  TV (machine): 500  FiO2: 50  PEEP: 6  ITime: 1  MAP: 9  PIP: 22        IMAGING:   Recent imaging studies were reviewed.    LAB RESULTS:                          12.0   12.55 )-----------( 184      ( 01 Jan 2025 03:27 )             37.6     01-01    147[H]  |  111[H]  |  49.4[H]  ----------------------------<  165[H]  4.2   |  24.0  |  1.34[H]    Ca    8.1[L]      01 Jan 2025 03:27  Phos  3.9     01-01  Mg     2.7     01-01    -----------------------------------------------------------------------------------------------------------------------------------------------------------------------------------    PHYSICAL EXAM:  General: Calm, intubated  HEENT: MMM  Neuro:  -Mental status- No acute distress, no EO, no FC  -CN- PERRL 3mm, EOMI, tongue midline, face symmetric  +cough/gag/corneals    bilat upper ext 0  LLE 0  RLE WD    CV: RRR  Pulm: clear to auscultation  Abd: Soft, nontender, nondistended  Ext: no noted edema in lower ext  Skin: warm, dry

## 2025-01-01 NOTE — PROGRESS NOTE ADULT - ASSESSMENT
69 yr old male with acute non-traumatic cerebellar ICH, with vasogenic edema, cerebellar compression; obstructive hydrocephalus.  S/p decompressive SOC 12/21/2024 and 12/27.    Patient is critically ill with high probability of imminent neurologic or life-threatening deterioration due to the following diagnoses:  1) Cerebellar Hemorrhage  2) Obstructive Hydrocephalus  - For which we are monitoring CSF output and adjusting shunt as necessary  3) Respiratory Failure  - For which we are treating with antibiotics and weaning ventilator settings with spontaneous breathing trials as tolerated    Plan:  - neurochecks  - Encephalopathy out of proportion to injury, repeat EEG given expanding hygromas for evidence of cortical irritation  - empiric Keppra 750mg BID with 3g load  - Repeat CTH tomorrow monitoring Hygromas  - Osats>92%, normocapnia; vent support  - Volume Goal -500cc to -1L  - Hydralazine, Valsartan 160, Coreg  - Liberalize -160 postop, avoid significant fluctuations  - HR control - aim for <110;   - hold full AC in view of fresh ICH  - monitor e-lytes, I/Os, IVf; maintain Na 140-150, BMP daily; cont flomax  - cont TF, bowel regimen; PPI for ulcer ppx  - monitor FS, aim for 120-180, ISS  - Completed empiric Zosyn course  - SCDs, start SQL              69 yr old male with acute non-traumatic cerebellar ICH, with vasogenic edema, cerebellar compression; obstructive hydrocephalus.  S/p decompressive SOC 12/21/2024 and 12/27.    Patient is critically ill with high probability of imminent neurologic or life-threatening deterioration due to the following diagnoses:  1) Cerebellar Hemorrhage  2) Obstructive Hydrocephalus  - For which we are monitoring CSF output and adjusting shunt as necessary  3) Respiratory Failure  - For which we are treating with antibiotics and weaning ventilator settings with spontaneous breathing trials as tolerated    Plan:  Neuro  - neurochecks q2h  - Encephalopathy out of proportion to injury, repeat EEG given expanding hygromas for evidence of cortical irritation  - empiric Keppra 750mg BID    - repeat CTH today for hydro watch  - start amantadine    Pulm  - Osats>92%, normocapnia; vent support    CV  - Hydralazine, Valsartan 160, Coreg  - Liberalize -160 postop, avoid significant fluctuations  - HR control - aim for <110;   - hold full AC in view of fresh ICH    GI  - cont TF, bowel regimen; PPI for ulcer ppx  LBM 12/31      - alvares replaced 12/27- TOV today  - monitor e-lytes, I/Os, IVf; maintain Na 140-150, BMP daily; cont cardura  - FWF 300cc q6h    Endo  - monitor FS, aim for 120-180  - ISS    ID  - Completed empiric Zosyn course    Heme  - SCDs,  SQL

## 2025-01-01 NOTE — EEG REPORT - NS EEG TEXT BOX
MERRITT, CLAUDE [id number]     Study Date: 		12-31-24 (113) - 01-01-25 (0800)   Duration: 20hr 29 min   --------------------------------------------------------------------------------------------------  History:  CC/ HPI Patient is a 69y old  Male who presents with a chief complaint of ICH (31 Dec 2024 14:10)    MEDICATIONS  (STANDING):  fentaNYL    Injectable 50 MICROGram(s) IV Push once  levETIRAcetam   Injectable 750 milliGRAM(s) IV Push every 12 hours  --------------------------------------------------------------------------------------------------  Study Interpretation:    [[[Abbreviation Key:  PDR=alpha rhythm/posterior dominant rhythm. A-P=anterior posterior gradient.  Amplitude: ‘very low’:<20; ‘low’:20-50; ‘medium’:; ‘high’:>200uV.  Persistence for periodic/rhythmic patterns (% of epoch) ‘rare’:<1%; ‘occasional’:1-10%; ‘frequent’:10-50%; ‘abundant’:50-90%; ‘continuous’:>90%.  Persistence for sporadic discharges: ‘rare’:<1/hr; ‘occasional’:1/min-1/hr; ‘frequent’:>1/min; ‘abundant’:>1/10 sec.  GRDA=generalized rhythmic delta activity; FIRDA=frontal intermittent GRDA; LRDA=lateralized rhythmic delta activity; TIRDA=temporal intermittent rhythmic delta activity;  LPD=PLED=lateralized periodic discharges; GPD=generalized periodic discharges; BiPDs=BiPLEDs=bilateral independent periodic epileptiform discharges; SIRPID=stimulus induced rhythmic, periodic, or ictal appearing discharges; BIRDs=brief potentially ictal rhythmic discharges >4 Hz, lasting .5-10s; PFA=paroxysmal bursts of beta/gamma; LVFA=low voltage fast activity.  Modifiers: +F=with fast component; +S=with spike component; +R=with rhythmic component.  S-B=burst suppression pattern.  Max=maximal. N1-drowsy; N2-stage II sleep; N3-slow wave sleep. SSS/BETS=small sharp spikes/benign epileptiform transients of sleep. HV=hyperventilation; PS=photic stimulation]]]    Daily EEG Visual Analysis    FINDINGS:      Background:  Continuity: continuous  Symmetry: symmetric  PDR: none  Reactivity: no stimulation  Voltage: diffusely attenuated   Anterior Posterior Gradient: absent  Other background findings: none  Breach: absent    Background Slowing:  Generalized slowing: diffuse polymorphic delta slowing  Focal slowing: none was present.    State Changes:   -N2 sleep transients were not recorded.      Sporadic Epileptiform Discharges:    None    Rhythmic and Periodic Patterns (RPPs):  Intermittent Generalized rhythmic delta slowing (GRDA) present, 1-2Hz    Electrographic and Electroclinical seizures:  None    Other Clinical Events:  None    Activation Procedures:   -Hyperventilation was not performed.    -Photic stimulation was not performed.       Artifacts:  Intermittent myogenic and movement artifacts were noted.    ECG:  The heart rate on single channel ECG was predominantly between 60-70 BPM.    EEG Classification / Summary:  Abnormal  EEG in a comatose patient:   1. Generalized rhythmic delta activity (GRDA), intermittent   2. Generalized background slowing, moderate-severe    Clinical Impression:   1. Moderate-severe diffuse cerebral dysfunction.   2. There were no epileptiform abnormalities recorded.      *PRELIM READ, ATTENDING REVIEW PENDING*       -------------------------------------------------------------------------------------------------------  [facility] EEG Reading Room Ph#: [phone]  Epilepsy Answering Service after 5PM and before 8:30AM: Ph#: [phone]    Cipriano Adame DO   Epilepsy Fellow    MERRITT, CLAUDE [id number]     Study Date: 		12-31-24 (113) - 01-01-25 (0800)   Duration: 20hr 29 min   --------------------------------------------------------------------------------------------------  History:  CC/ HPI Patient is a 69y old  Male who presents with a chief complaint of ICH (31 Dec 2024 14:10)    MEDICATIONS  (STANDING):  fentaNYL    Injectable 50 MICROGram(s) IV Push once  levETIRAcetam   Injectable 750 milliGRAM(s) IV Push every 12 hours  --------------------------------------------------------------------------------------------------  Study Interpretation:    [[[Abbreviation Key:  PDR=alpha rhythm/posterior dominant rhythm. A-P=anterior posterior gradient.  Amplitude: ‘very low’:<20; ‘low’:20-50; ‘medium’:; ‘high’:>200uV.  Persistence for periodic/rhythmic patterns (% of epoch) ‘rare’:<1%; ‘occasional’:1-10%; ‘frequent’:10-50%; ‘abundant’:50-90%; ‘continuous’:>90%.  Persistence for sporadic discharges: ‘rare’:<1/hr; ‘occasional’:1/min-1/hr; ‘frequent’:>1/min; ‘abundant’:>1/10 sec.  GRDA=generalized rhythmic delta activity; FIRDA=frontal intermittent GRDA; LRDA=lateralized rhythmic delta activity; TIRDA=temporal intermittent rhythmic delta activity;  LPD=PLED=lateralized periodic discharges; GPD=generalized periodic discharges; BiPDs=BiPLEDs=bilateral independent periodic epileptiform discharges; SIRPID=stimulus induced rhythmic, periodic, or ictal appearing discharges; BIRDs=brief potentially ictal rhythmic discharges >4 Hz, lasting .5-10s; PFA=paroxysmal bursts of beta/gamma; LVFA=low voltage fast activity.  Modifiers: +F=with fast component; +S=with spike component; +R=with rhythmic component.  S-B=burst suppression pattern.  Max=maximal. N1-drowsy; N2-stage II sleep; N3-slow wave sleep. SSS/BETS=small sharp spikes/benign epileptiform transients of sleep. HV=hyperventilation; PS=photic stimulation]]]    Daily EEG Visual Analysis    FINDINGS:      Background:  Continuity: continuous  Symmetry: symmetric  PDR: none  Reactivity: no stimulation  Voltage: diffusely attenuated   Anterior Posterior Gradient: absent  Other background findings: none  Breach: absent    Background Slowing:  Generalized slowing: diffuse polymorphic delta slowing  Focal slowing: none was present.    State Changes:   -N2 sleep transients were not recorded.      Sporadic Epileptiform Discharges:    None    Rhythmic and Periodic Patterns (RPPs):  Intermittent Generalized rhythmic delta slowing (GRDA) present, 1-2Hz    Electrographic and Electroclinical seizures:  None    Other Clinical Events:  None    Activation Procedures:   -Hyperventilation was not performed.    -Photic stimulation was not performed.       Artifacts:  Intermittent myogenic and movement artifacts were noted.    ECG:  The heart rate on single channel ECG was predominantly between 60-70 BPM.    EEG Classification / Summary:  Abnormal  EEG in a comatose patient:   1. Generalized rhythmic delta activity (GRDA), intermittent   2. Generalized background slowing, moderate-severe    Clinical Impression:   1. Moderate-severe diffuse cerebral dysfunction.   2. There were no epileptiform abnormalities recorded.      -------------------------------------------------------------------------------------------------------  [facility] EEG Reading Room Ph#: [phone]  Epilepsy Answering Service after 5PM and before 8:30AM: Ph#: [phone]    Cipriano Adame DO   Epilepsy Fellow     Michael Matta MD PhD  Director, Epilepsy Division, [facility]

## 2025-01-02 NOTE — PROGRESS NOTE ADULT - SUBJECTIVE AND OBJECTIVE BOX
CC:  Follow up GOC , Symptoms    OVERNIGHT EVENTS:  remains vent dependent  ms poor - RN reports only responsive to deep stimuli    Present Symptoms:   Dyspnea:  No    Nausea/Vomiting:  No    Anxiety/ Agitation No   Depression:   Unable to assess  Fatigue:   Unable  to assess  Loss of appetite:  NA   Constipation: Not Reported  Pain:   No Signs            Location            Duration            Character            Severity            Factors            Effect    Pain AD Score:  [email or web address] (press ctrl + left click to view)    Review of Systems: Reviewed as above  Further ROS unable  to  obtain due to poor mentation     MEDICATIONS  (STANDING):  acetylcysteine 10%  Inhalation 4 milliLiter(s) Inhalation every 6 hours  albuterol    0.083% 2.5 milliGRAM(s) Nebulizer every 6 hours  amantadine Syrup 100 milliGRAM(s) Oral <User Schedule>  atorvastatin 40 milliGRAM(s) Oral at bedtime  carvedilol 12.5 milliGRAM(s) Oral every 12 hours  chlorhexidine 0.12% Liquid 15 milliLiter(s) Oral Mucosa every 12 hours  chlorhexidine 2% Cloths 1 Application(s) Topical daily  doxazosin 4 milliGRAM(s) Oral at bedtime  enoxaparin Injectable 40 milliGRAM(s) SubCutaneous <User Schedule>  fentaNYL    Injectable 50 MICROGram(s) IV Push once  folic acid 1 milliGRAM(s) Oral daily  hydrALAZINE 50 milliGRAM(s) Oral every 8 hours  insulin lispro (ADMELOG) corrective regimen sliding scale   SubCutaneous every 6 hours  levETIRAcetam   Injectable 500 milliGRAM(s) IV Push every 12 hours  multivitamin 1 Tablet(s) Oral daily  pantoprazole   Suspension 40 milliGRAM(s) Oral daily  polyethylene glycol 3350 17 Gram(s) Oral daily  senna 2 Tablet(s) Oral at bedtime  sodium chloride 0.9% Bolus 250 milliLiter(s) IV Bolus once  thiamine 100 milliGRAM(s) Oral daily    MEDICATIONS  (PRN):  hydrALAZINE Injectable 10 milliGRAM(s) IV Push every 2 hours PRN SBP>160  ondansetron Injectable 4 milliGRAM(s) IV Push every 6 hours PRN Nausea and/or Vomiting      PHYSICAL EXAM:  Vital Signs Last 24 Hrs  T(C): 38 (02 Jan 2025 12:00), Max: 38.1 (02 Jan 2025 10:00)  T(F): 100.4 (02 Jan 2025 12:00), Max: 100.6 (02 Jan 2025 10:00)  HR: 85 (02 Jan 2025 15:31) (57 - 85)  BP: 122/58 (02 Jan 2025 10:00) (108/54 - 138/65)  BP(mean): 78 (02 Jan 2025 10:00) (69 - 90)  RR: 20 (02 Jan 2025 12:00) (7 - 20)  SpO2: 95% (02 Jan 2025 15:31) (93% - 99%)    Parameters below as of 02 Jan 2025 12:00  Patient On (Oxygen Delivery Method): ventilator    O2 Concentration (%): 50    Karnofsky: 10 %  General: Elderly man intubated NAD         HEENT:  NCAT       (x  )  ET tube    Lungs: comfortable  non labored  CV:  RR  GI:   soft NTND  MSK: bb  Skin:  warm/dry  Neuro Aox0 non responsive  Psych  na    LABS:                          12.5   14.37 )-----------( 207      ( 02 Jan 2025 15:00 )             39.9     01-02    147[H]  |  110[H]  |  47.8[H]  ----------------------------<  129[H]  4.3   |  24.0  |  1.34[H]    Ca    8.4      02 Jan 2025 15:00  Phos  3.9     01-02  Mg     2.7     01-02        Urinalysis Basic - ( 02 Jan 2025 15:00 )    Color: x / Appearance: x / SG: x / pH: x  Gluc: 129 mg/dL / Ketone: x  / Bili: x / Urobili: x   Blood: x / Protein: x / Nitrite: x   Leuk Esterase: x / RBC: x / WBC x   Sq Epi: x / Non Sq Epi: x / Bacteria: x      I&O's Summary    01 Jan 2025 07:01  -  02 Jan 2025 07:00  --------------------------------------------------------  IN: 1590 mL / OUT: 1280 mL / NET: 310 mL    02 Jan 2025 07:01  -  02 Jan 2025 15:59  --------------------------------------------------------  IN: 550 mL / OUT: 500 mL / NET: 50 mL        RADIOLOGY & ADDITIONAL STUDIES:  Imaging Reviewed  ( x  )   < from: CT Head No Cont (01.01.25 @ 12:37) >    ACC: [id number] EXAM:  CT BRAIN   ORDERED BY: SHADY WATERS     PROCEDURE DATE:  01/01/2025          INTERPRETATION:  CLINICAL INFORMATION: Stability scan    COMPARISON: Head CT performed earlier the same day at 4:58 AM.    Multiple axial sections were performed from the base of skull to vertex   without contrast enhancement. Coronal sagittal instructions were performed    Postoperative changes compatible with bilateral suboccipital craniectomy   is again identified. Abnormal low-attenuationinvolving the postop region   is again seen with some surrounding edema. This finding appears   unchanged. 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 This finding measures approximately 3.2 cm   widest diameter and previously measured approximately 3.2 cm in widest   diameter.    The size and configuration of ventricles appear unchanged.   Intraventricular hemorrhage is again seen.      Mixed attenuated subdural hematomas are again identified in thebifrontal   region. These findings appear stable.    Evaluation of the osseous structures aside from postop changes appear   normal    Left-sided maxillary sinus mucosal thickening is seen Air-fluid level   mucosal thickening seen involving left sphenoid sinus. Air-fluid level is   seen involving right sphenoid sinus Bilateral ethmoid sinus mucosal   thickening is seen    Both mastoid regions demonstrate partial opacification. Skin staples seen   in the postop region.    IMPRESSION: Stable exam.    --- End of Report ---    < end of copied text >            ADVANCE DIRECTIVE PREFERENCES    Full Code

## 2025-01-02 NOTE — EEG REPORT - NS EEG TEXT BOX
MERRITT, CLAUDE [facility]-[id number]     Study Date: 		1-1-25 (08:00) - 01-02-25 (0800)   Duration: 24h   --------------------------------------------------------------------------------------------------  History:  CC/ HPI Patient is a 69y old  Male who presents with a chief complaint of ICH (31 Dec 2024 14:10)    MEDICATIONS  (STANDING):  acetaminophen   IVPB .. 1000 milliGRAM(s) IV Intermittent once  acetylcysteine 10%  Inhalation 4 milliLiter(s) Inhalation every 6 hours  albuterol    0.083% 2.5 milliGRAM(s) Nebulizer every 6 hours  amantadine Syrup 100 milliGRAM(s) Oral <User Schedule>  amLODIPine   Tablet 10 milliGRAM(s) Oral daily  atorvastatin 40 milliGRAM(s) Oral at bedtime  carvedilol 25 milliGRAM(s) Oral every 12 hours  doxazosin 4 milliGRAM(s) Oral at bedtime  enoxaparin Injectable 40 milliGRAM(s) SubCutaneous <User Schedule>  fentaNYL    Injectable 50 MICROGram(s) IV Push once  folic acid 1 milliGRAM(s) Oral daily  hydrALAZINE 75 milliGRAM(s) Oral every 8 hours  insulin lispro (ADMELOG) corrective regimen sliding scale   SubCutaneous every 6 hours  levETIRAcetam   Injectable 750 milliGRAM(s) IV Push every 12 hours  multivitamin 1 Tablet(s) Oral daily  pantoprazole   Suspension 40 milliGRAM(s) Oral daily  polyethylene glycol 3350 17 Gram(s) Oral daily  senna 2 Tablet(s) Oral at bedtime  thiamine 100 milliGRAM(s) Oral daily  valsartan 160 milliGRAM(s) Oral daily  --------------------------------------------------------------------------------------------------  Study Interpretation:    [[[Abbreviation Key:  PDR=alpha rhythm/posterior dominant rhythm. A-P=anterior posterior gradient.  Amplitude: ‘very low’:<20; ‘low’:20-50; ‘medium’:; ‘high’:>200uV.  Persistence for periodic/rhythmic patterns (% of epoch) ‘rare’:<1%; ‘occasional’:1-10%; ‘frequent’:10-50%; ‘abundant’:50-90%; ‘continuous’:>90%.  Persistence for sporadic discharges: ‘rare’:<1/hr; ‘occasional’:1/min-1/hr; ‘frequent’:>1/min; ‘abundant’:>1/10 sec.  GRDA=generalized rhythmic delta activity; FIRDA=frontal intermittent GRDA; LRDA=lateralized rhythmic delta activity; TIRDA=temporal intermittent rhythmic delta activity;  LPD=PLED=lateralized periodic discharges; GPD=generalized periodic discharges; BiPDs=BiPLEDs=bilateral independent periodic epileptiform discharges; SIRPID=stimulus induced rhythmic, periodic, or ictal appearing discharges; BIRDs=brief potentially ictal rhythmic discharges >4 Hz, lasting .5-10s; PFA=paroxysmal bursts of beta/gamma; LVFA=low voltage fast activity.  Modifiers: +F=with fast component; +S=with spike component; +R=with rhythmic component.  S-B=burst suppression pattern.  Max=maximal. N1-drowsy; N2-stage II sleep; N3-slow wave sleep. SSS/BETS=small sharp spikes/benign epileptiform transients of sleep. HV=hyperventilation; PS=photic stimulation]]]    Daily EEG Visual Analysis    FINDINGS:      Background:  Continuity: continuous  Symmetry: symmetric  PDR: posterior max 6hz 30uV activity with stimulation/arousal  Reactivity: no stimulation  Voltage: diffusely attenuated   Anterior Posterior Gradient: absent  Other background findings: none  Breach: absent    Background Slowing:  Generalized slowing: diffuse polymorphic delta slowing  Focal slowing: none was present.    State Changes:   -Rudimentary sleep spindles/transients were recorded.      Sporadic Epileptiform Discharges:    None    Rhythmic and Periodic Patterns (RPPs):  Intermittent Generalized rhythmic delta slowing (GRDA) present, 1Hz    Electrographic and Electroclinical seizures:  None    Other Clinical Events:  None    Activation Procedures:   -Hyperventilation was not performed.    -Photic stimulation was not performed.       Artifacts:  Intermittent myogenic and movement artifacts were noted.    ECG:  The heart rate on single channel ECG was predominantly between 60-70 BPM.    EEG Classification / Summary:  Abnormal  EEG in a comatose patient:   1. Generalized rhythmic delta activity near 1hz (GRDA), intermittent   2. Generalized background slowing, moderate    Clinical Impression:   1. Moderate diffuse cerebral dysfunction.  Slight improvement vs prior day  2. There were no epileptiform abnormalities recorded.        Fredy Avila MD FAES  Director, Continuous EEG Monitoring Program, [facility]   and Epilepsy Fellowship ,   Department of Neurology, [facility] [employer]    [facility] EEG Reading Room Ph#: [phone]  Epilepsy Answering Service after 5PM and before 8:30AM: Ph#: [phone]   MERRITT, CLAUDE [facility]-[id number]     Study Date: 		1-1-25 (08:00) - 01-02-25 (0800)   Duration: 24h   --------------------------------------------------------------------------------------------------  History:  CC/ HPI Patient is a 69y old  Male who presents with a chief complaint of ICH (31 Dec 2024 14:10)    MEDICATIONS  (STANDING):  acetaminophen   IVPB .. 1000 milliGRAM(s) IV Intermittent once  acetylcysteine 10%  Inhalation 4 milliLiter(s) Inhalation every 6 hours  albuterol    0.083% 2.5 milliGRAM(s) Nebulizer every 6 hours  amantadine Syrup 100 milliGRAM(s) Oral <User Schedule>  amLODIPine   Tablet 10 milliGRAM(s) Oral daily  atorvastatin 40 milliGRAM(s) Oral at bedtime  carvedilol 25 milliGRAM(s) Oral every 12 hours  doxazosin 4 milliGRAM(s) Oral at bedtime  enoxaparin Injectable 40 milliGRAM(s) SubCutaneous <User Schedule>  fentaNYL    Injectable 50 MICROGram(s) IV Push once  folic acid 1 milliGRAM(s) Oral daily  hydrALAZINE 75 milliGRAM(s) Oral every 8 hours  insulin lispro (ADMELOG) corrective regimen sliding scale   SubCutaneous every 6 hours  levETIRAcetam   Injectable 750 milliGRAM(s) IV Push every 12 hours  multivitamin 1 Tablet(s) Oral daily  pantoprazole   Suspension 40 milliGRAM(s) Oral daily  polyethylene glycol 3350 17 Gram(s) Oral daily  senna 2 Tablet(s) Oral at bedtime  thiamine 100 milliGRAM(s) Oral daily  valsartan 160 milliGRAM(s) Oral daily  --------------------------------------------------------------------------------------------------  Study Interpretation:    [[[Abbreviation Key:  PDR=alpha rhythm/posterior dominant rhythm. A-P=anterior posterior gradient.  Amplitude: ‘very low’:<20; ‘low’:20-50; ‘medium’:; ‘high’:>200uV.  Persistence for periodic/rhythmic patterns (% of epoch) ‘rare’:<1%; ‘occasional’:1-10%; ‘frequent’:10-50%; ‘abundant’:50-90%; ‘continuous’:>90%.  Persistence for sporadic discharges: ‘rare’:<1/hr; ‘occasional’:1/min-1/hr; ‘frequent’:>1/min; ‘abundant’:>1/10 sec.  GRDA=generalized rhythmic delta activity; FIRDA=frontal intermittent GRDA; LRDA=lateralized rhythmic delta activity; TIRDA=temporal intermittent rhythmic delta activity;  LPD=PLED=lateralized periodic discharges; GPD=generalized periodic discharges; BiPDs=BiPLEDs=bilateral independent periodic epileptiform discharges; SIRPID=stimulus induced rhythmic, periodic, or ictal appearing discharges; BIRDs=brief potentially ictal rhythmic discharges >4 Hz, lasting .5-10s; PFA=paroxysmal bursts of beta/gamma; LVFA=low voltage fast activity.  Modifiers: +F=with fast component; +S=with spike component; +R=with rhythmic component.  S-B=burst suppression pattern.  Max=maximal. N1-drowsy; N2-stage II sleep; N3-slow wave sleep. SSS/BETS=small sharp spikes/benign epileptiform transients of sleep. HV=hyperventilation; PS=photic stimulation]]]    Daily EEG Visual Analysis    FINDINGS:      Background:  Continuity: continuous  Symmetry: symmetric  PDR: posterior max 6hz 30uV activity with stimulation/arousal  Reactivity: no stimulation  Voltage: diffusely attenuated   Anterior Posterior Gradient: absent  Other background findings: none  Breach: absent    Background Slowing:  Generalized slowing: diffuse polymorphic delta slowing  Focal slowing: none was present.    State Changes:   -Rudimentary sleep spindles/transients were recorded.      Sporadic Epileptiform Discharges:    None    Rhythmic and Periodic Patterns (RPPs):  Intermittent Generalized rhythmic delta slowing (GRDA) present, 1Hz    Electrographic and Electroclinical seizures:  None    Other Clinical Events:  None    Activation Procedures:   -Hyperventilation was not performed.    -Photic stimulation was not performed.       Artifacts:  Intermittent myogenic and movement artifacts were noted.    ECG:  The heart rate on single channel ECG was predominantly between 60-70 BPM.    EEG Classification / Summary:  Abnormal  EEG in a comatose patient:   1. Generalized rhythmic delta activity near 1hz (GRDA), intermittent   2. Generalized background slowing, moderate    Clinical Impression:   1. Moderate diffuse cerebral dysfunction.  Slight improvement vs prior day  2. There were no epileptiform abnormalities recorded.      No seizures   In absence of additional clinical concerns, recommend consideration for discontinuation of current EEG study with reconnection in future if warranted.      MD RODERICK High  Director, Continuous EEG Monitoring Program, [facility]   and Epilepsy Fellowship ,   Department of Neurology, [facility] [employer]    [facility] EEG Reading Room Ph#: [phone]  Epilepsy Answering Service after 5PM and before 8:30AM: Ph#: [phone]   MERRITT, CLAUDE [facility]-[id number]     Study Date: 		1-1-25 (08:00) - 01-02-25 (10:30)   Duration: 26.5h   --------------------------------------------------------------------------------------------------  History:  CC/ HPI Patient is a 69y old  Male who presents with a chief complaint of ICH (31 Dec 2024 14:10)    MEDICATIONS  (STANDING):  acetaminophen   IVPB .. 1000 milliGRAM(s) IV Intermittent once  acetylcysteine 10%  Inhalation 4 milliLiter(s) Inhalation every 6 hours  albuterol    0.083% 2.5 milliGRAM(s) Nebulizer every 6 hours  amantadine Syrup 100 milliGRAM(s) Oral <User Schedule>  amLODIPine   Tablet 10 milliGRAM(s) Oral daily  atorvastatin 40 milliGRAM(s) Oral at bedtime  carvedilol 25 milliGRAM(s) Oral every 12 hours  doxazosin 4 milliGRAM(s) Oral at bedtime  enoxaparin Injectable 40 milliGRAM(s) SubCutaneous <User Schedule>  fentaNYL    Injectable 50 MICROGram(s) IV Push once  folic acid 1 milliGRAM(s) Oral daily  hydrALAZINE 75 milliGRAM(s) Oral every 8 hours  insulin lispro (ADMELOG) corrective regimen sliding scale   SubCutaneous every 6 hours  levETIRAcetam   Injectable 750 milliGRAM(s) IV Push every 12 hours  multivitamin 1 Tablet(s) Oral daily  pantoprazole   Suspension 40 milliGRAM(s) Oral daily  polyethylene glycol 3350 17 Gram(s) Oral daily  senna 2 Tablet(s) Oral at bedtime  thiamine 100 milliGRAM(s) Oral daily  valsartan 160 milliGRAM(s) Oral daily  --------------------------------------------------------------------------------------------------  Study Interpretation:    [[[Abbreviation Key:  PDR=alpha rhythm/posterior dominant rhythm. A-P=anterior posterior gradient.  Amplitude: ‘very low’:<20; ‘low’:20-50; ‘medium’:; ‘high’:>200uV.  Persistence for periodic/rhythmic patterns (% of epoch) ‘rare’:<1%; ‘occasional’:1-10%; ‘frequent’:10-50%; ‘abundant’:50-90%; ‘continuous’:>90%.  Persistence for sporadic discharges: ‘rare’:<1/hr; ‘occasional’:1/min-1/hr; ‘frequent’:>1/min; ‘abundant’:>1/10 sec.  GRDA=generalized rhythmic delta activity; FIRDA=frontal intermittent GRDA; LRDA=lateralized rhythmic delta activity; TIRDA=temporal intermittent rhythmic delta activity;  LPD=PLED=lateralized periodic discharges; GPD=generalized periodic discharges; BiPDs=BiPLEDs=bilateral independent periodic epileptiform discharges; SIRPID=stimulus induced rhythmic, periodic, or ictal appearing discharges; BIRDs=brief potentially ictal rhythmic discharges >4 Hz, lasting .5-10s; PFA=paroxysmal bursts of beta/gamma; LVFA=low voltage fast activity.  Modifiers: +F=with fast component; +S=with spike component; +R=with rhythmic component.  S-B=burst suppression pattern.  Max=maximal. N1-drowsy; N2-stage II sleep; N3-slow wave sleep. SSS/BETS=small sharp spikes/benign epileptiform transients of sleep. HV=hyperventilation; PS=photic stimulation]]]    Daily EEG Visual Analysis    FINDINGS:      Background:  Continuity: continuous  Symmetry: symmetric  PDR: posterior max 6hz 30uV activity with stimulation/arousal  Reactivity: no stimulation  Voltage: diffusely attenuated   Anterior Posterior Gradient: absent  Other background findings: none  Breach: absent    Background Slowing:  Generalized slowing: diffuse polymorphic delta slowing  Focal slowing: none was present.    State Changes:   -Rudimentary sleep spindles/transients were recorded.      Sporadic Epileptiform Discharges:    None    Rhythmic and Periodic Patterns (RPPs):  Intermittent Generalized rhythmic delta slowing (GRDA) present, 1Hz    Electrographic and Electroclinical seizures:  None    Other Clinical Events:  None    Activation Procedures:   -Hyperventilation was not performed.    -Photic stimulation was not performed.       Artifacts:  Intermittent myogenic and movement artifacts were noted.    ECG:  The heart rate on single channel ECG was predominantly between 60-70 BPM.    EEG Classification / Summary:  Abnormal  EEG in a comatose patient:   1. Generalized rhythmic delta activity near 1hz (GRDA), intermittent   2. Generalized background slowing, moderate    Clinical Impression:   1. Moderate diffuse cerebral dysfunction.  Slight improvement vs prior day  2. There were no epileptiform abnormalities recorded.      No seizures   In absence of additional clinical concerns, recommend consideration for discontinuation of current EEG study with reconnection in future if warranted.      MD RODERICK High  Director, Continuous EEG Monitoring Program, [facility]   and Epilepsy Fellowship ,   Department of Neurology, [facility]    [facility] EEG Reading Room Ph#: [phone]  Epilepsy Answering Service after 5PM and before 8:30AM: Ph#: [phone]

## 2025-01-02 NOTE — PROGRESS NOTE ADULT - SUBJECTIVE AND OBJECTIVE BOX
Chief complaint:   Patient is a 69y old  Male who presents with a chief complaint of ICH (01 Jan 2025 10:04)    HPI:   69 yr old male history of htn, hld , on afib eliquis with prior ischemic stroke 2018, frequent utis, kidney stones, turp, cabg x 2, 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  went to [place], ich, vomited gcs 13 intubated.  Transferred to [facility] for possible SOC.  On arrival patient is sedated on versed, propofol and fent.    MRS 0  ICH score 3  NIHSS 25 (21 Dec 2024 16:13)        24hr EVENTS:      ROS: [ ]  Unable to assess due to mental status   All other systems negative    -----------------------------------------------------------------------------------------------------------------------------------------------------------------------------------  ICU Vital Signs Last 24 Hrs  T(C): 37.9 (02 Jan 2025 07:00), Max: 37.9 (02 Jan 2025 03:00)  T(F): 100.2 (02 Jan 2025 07:00), Max: 100.2 (02 Jan 2025 03:00)  HR: 68 (02 Jan 2025 07:00) (57 - 73)  BP: 108/54 (02 Jan 2025 07:00) (104/51 - 137/70)  BP(mean): 70 (02 Jan 2025 07:00) (67 - 90)  ABP: 115/48 (02 Jan 2025 07:00) (109/65 - 137/46)  ABP(mean): 66 (02 Jan 2025 07:00) (63 - 80)  RR: 16 (02 Jan 2025 07:00) (7 - 19)  SpO2: 97% (02 Jan 2025 07:00) (94% - 98%)    O2 Parameters below as of 02 Jan 2025 04:00  Patient On (Oxygen Delivery Method): ventilator    O2 Concentration (%): 50        I&O's Summary    01 Jan 2025 07:01  -  02 Jan 2025 07:00  --------------------------------------------------------  IN: 1590 mL / OUT: 1280 mL / NET: 310 mL        MEDICATIONS  (STANDING):  acetaminophen   IVPB .. 1000 milliGRAM(s) IV Intermittent once  acetylcysteine 10%  Inhalation 4 milliLiter(s) Inhalation every 6 hours  albuterol    0.083% 2.5 milliGRAM(s) Nebulizer every 6 hours  amantadine Syrup 100 milliGRAM(s) Oral <User Schedule>  amLODIPine   Tablet 10 milliGRAM(s) Oral daily  atorvastatin 40 milliGRAM(s) Oral at bedtime  carvedilol 25 milliGRAM(s) Oral every 12 hours  chlorhexidine 0.12% Liquid 15 milliLiter(s) Oral Mucosa every 12 hours  chlorhexidine 2% Cloths 1 Application(s) Topical daily  doxazosin 4 milliGRAM(s) Oral at bedtime  enoxaparin Injectable 40 milliGRAM(s) SubCutaneous <User Schedule>  fentaNYL    Injectable 50 MICROGram(s) IV Push once  folic acid 1 milliGRAM(s) Oral daily  hydrALAZINE 75 milliGRAM(s) Oral every 8 hours  insulin lispro (ADMELOG) corrective regimen sliding scale   SubCutaneous every 6 hours  levETIRAcetam   Injectable 750 milliGRAM(s) IV Push every 12 hours  multivitamin 1 Tablet(s) Oral daily  pantoprazole   Suspension 40 milliGRAM(s) Oral daily  polyethylene glycol 3350 17 Gram(s) Oral daily  senna 2 Tablet(s) Oral at bedtime  thiamine 100 milliGRAM(s) Oral daily  valsartan 160 milliGRAM(s) Oral daily      RESPIRATORY:  Mode: AC/ CMV (Assist Control/ Continuous Mandatory Ventilation)  RR (machine): 16  TV (machine): 500  FiO2: 50  PEEP: 6  ITime: 1  MAP: 12  PIP: 32        IMAGING:   Recent imaging studies were reviewed.    LAB RESULTS:                          12.3   13.53 )-----------( 187      ( 02 Jan 2025 04:00 )             38.2           01-02    147[H]  |  111[H]  |  48.2[H]  ----------------------------<  166[H]  4.1   |  25.0  |  1.27    Ca    8.4      02 Jan 2025 04:00  Phos  3.9     01-02  Mg     2.7     01-02      -----------------------------------------------------------------------------------------------------------------------------------------------------------------------------------    PHYSICAL EXAM:  General: Calm, intubated  HEENT: MMM  Neuro:  -Mental status- No acute distress  -CN- PERRL 3mm, EOMI, tongue midline, face symmetric    CV: RRR  Pulm: clear to auscultation  Abd: Soft, nontender, nondistended  Ext: no noted edema in lower ext  Skin: warm, dry       Chief complaint:   Patient is a 69y old  Male who presents with a chief complaint of ICH (01 Jan 2025 10:04)    HPI:   69 yr old male history of htn, hld , on afib eliquis with prior ischemic stroke 2018, frequent utis, kidney stones, turp, cabg x 2, 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  went to [place], ich, vomited gcs 13 intubated.  Transferred to [facility] for possible SOC.  On arrival patient is sedated on versed, propofol and fent.    MRS 0  ICH score 3  NIHSS 25 (21 Dec 2024 16:13)        24hr EVENTS:  EEG- no sz    ROS: [x ]  Unable to assess due to mental status   All other systems negative    -----------------------------------------------------------------------------------------------------------------------------------------------------------------------------------  ICU Vital Signs Last 24 Hrs  T(C): 37.9 (02 Jan 2025 07:00), Max: 37.9 (02 Jan 2025 03:00)  T(F): 100.2 (02 Jan 2025 07:00), Max: 100.2 (02 Jan 2025 03:00)  HR: 68 (02 Jan 2025 07:00) (57 - 73)  BP: 108/54 (02 Jan 2025 07:00) (104/51 - 137/70)  BP(mean): 70 (02 Jan 2025 07:00) (67 - 90)  ABP: 115/48 (02 Jan 2025 07:00) (109/65 - 137/46)  ABP(mean): 66 (02 Jan 2025 07:00) (63 - 80)  RR: 16 (02 Jan 2025 07:00) (7 - 19)  SpO2: 97% (02 Jan 2025 07:00) (94% - 98%)    O2 Parameters below as of 02 Jan 2025 04:00  Patient On (Oxygen Delivery Method): ventilator    O2 Concentration (%): 50        I&O's Summary    01 Jan 2025 07:01  -  02 Jan 2025 07:00  --------------------------------------------------------  IN: 1590 mL / OUT: 1280 mL / NET: 310 mL        MEDICATIONS  (STANDING):  acetaminophen   IVPB .. 1000 milliGRAM(s) IV Intermittent once  acetylcysteine 10%  Inhalation 4 milliLiter(s) Inhalation every 6 hours  albuterol    0.083% 2.5 milliGRAM(s) Nebulizer every 6 hours  amantadine Syrup 100 milliGRAM(s) Oral <User Schedule>  amLODIPine   Tablet 10 milliGRAM(s) Oral daily  atorvastatin 40 milliGRAM(s) Oral at bedtime  carvedilol 25 milliGRAM(s) Oral every 12 hours  chlorhexidine 0.12% Liquid 15 milliLiter(s) Oral Mucosa every 12 hours  chlorhexidine 2% Cloths 1 Application(s) Topical daily  doxazosin 4 milliGRAM(s) Oral at bedtime  enoxaparin Injectable 40 milliGRAM(s) SubCutaneous <User Schedule>  fentaNYL    Injectable 50 MICROGram(s) IV Push once  folic acid 1 milliGRAM(s) Oral daily  hydrALAZINE 75 milliGRAM(s) Oral every 8 hours  insulin lispro (ADMELOG) corrective regimen sliding scale   SubCutaneous every 6 hours  levETIRAcetam   Injectable 750 milliGRAM(s) IV Push every 12 hours  multivitamin 1 Tablet(s) Oral daily  pantoprazole   Suspension 40 milliGRAM(s) Oral daily  polyethylene glycol 3350 17 Gram(s) Oral daily  senna 2 Tablet(s) Oral at bedtime  thiamine 100 milliGRAM(s) Oral daily  valsartan 160 milliGRAM(s) Oral daily      RESPIRATORY:  Mode: AC/ CMV (Assist Control/ Continuous Mandatory Ventilation)  RR (machine): 16  TV (machine): 500  FiO2: 50  PEEP: 6  ITime: 1  MAP: 12  PIP: 32        IMAGING:   Recent imaging studies were reviewed.    LAB RESULTS:                          12.3   13.53 )-----------( 187      ( 02 Jan 2025 04:00 )             38.2     01-02    147[H]  |  111[H]  |  48.2[H]  ----------------------------<  166[H]  4.1   |  25.0  |  1.27    Ca    8.4      02 Jan 2025 04:00  Phos  3.9     01-02  Mg     2.7     01-02    -----------------------------------------------------------------------------------------------------------------------------------------------------------------------------------  PHYSICAL EXAM:  General: Calm, intubated  HEENT: MMM  Neuro:  -Mental status- No acute distress, no EO, no FC  -CN- PERRL 3mm, EOMI, tongue midline, face symmetric  +cough/gag/corneals    bilat upper ext 0  LLE 0  RLE WD weak    CV: RRR  Pulm: clear to auscultation  Abd: Soft, nontender, nondistended  Ext: no noted edema in lower ext  Skin: warm, dry

## 2025-01-02 NOTE — PROGRESS NOTE ADULT - ASSESSMENT
69 year old male PMHx afib (on eliquis with prior ischemic stroke 2018), HTN, CABG x 2, ETOH use transferred from [place] for ICH. Patient admitted to NEURO ICU for monitoring, Palliative consulted for support as patient condition not improving, no gag/cough reflex and poor neuro exam.      Acute Respiratory Failure  vent dependent  family deciding trach/peg    ICH  NEURO ICU management  Noted CT Head 12.27.24/12.28.24  c/w Neuro checks    Debility  Assist in ADLS  Maintain safety, fall, aspiration precautions  turn reposition    Encounter for Palliative Care  Palliative Care consulted to assist with goals of care.  Per GOC 12/30 -  goals within plan to continue to medically optimize. Family is hopeful with time he will be able to show some improvement with wakefulness  Case discussed with NSICU. Patient with poor ms, vent dependent.  Family was discussed trach/peg  PC will reach out to family for further support

## 2025-01-02 NOTE — PROGRESS NOTE ADULT - ASSESSMENT
69 yr old male with acute non-traumatic cerebellar ICH, with vasogenic edema, cerebellar compression; obstructive hydrocephalus.  S/p decompressive SOC 12/21/2024 and 12/27.    Patient is critically ill with high probability of imminent neurologic or life-threatening deterioration due to the following diagnoses:  1) Cerebellar Hemorrhage  2) Obstructive Hydrocephalus  - For which we are monitoring CSF output and adjusting shunt as necessary  3) Respiratory Failure  - For which we are treating with antibiotics and weaning ventilator settings with spontaneous breathing trials as tolerated    Plan:  Neuro  - neurochecks q2h  - Encephalopathy out of proportion to injury, repeat EEG given expanding hygromas for evidence of cortical irritation  - empiric Keppra 750mg BID    - repeat CTH today for hydro watch  - start amantadine    Pulm  - Osats>92%, normocapnia; vent support    CV  - Hydralazine, Valsartan 160, Coreg  - Liberalize -160 postop, avoid significant fluctuations  - HR control - aim for <110;   - hold full AC in view of fresh ICH    GI  - cont TF, bowel regimen; PPI for ulcer ppx  LBM 12/31      - alvares replaced 12/27- TOV today  - monitor e-lytes, I/Os, IVf; maintain Na 140-150, BMP daily; cont cardura  - FWF 300cc q6h    Endo  - monitor FS, aim for 120-180  - ISS    ID  - Completed empiric Zosyn course    Heme  - SCDs,  SQL              69 yr old male h/o afib with acute non-traumatic cerebellar ICH, with vasogenic edema, cerebellar compression; obstructive hydrocephalus.  S/p decompressive SOC 12/21/2024 and 12/27.    Patient is critically ill with high probability of imminent neurologic or life-threatening deterioration due to the following diagnoses:  1) Cerebellar Hemorrhage  2) Obstructive Hydrocephalus  - For which we are monitoring CSF output and adjusting shunt as necessary  3) Respiratory Failure  - For which we are treating with antibiotics and weaning ventilator settings with spontaneous breathing trials as tolerated    Plan:  Neuro  - neurochecks q4h  - empiric Keppra 500mg BID    - DC EEG  - amantadine  - appreciate Palliative recs  - no sedation needed    Pulm  - reintubated 12/23 (day 10 reintubation)  - Osats>92%, normocapnia; vent support    CV  - Hydralazine dec 50 q8h, Valsartan 160, Coreg dec 12.5 bid  - Liberalize -160, avoid significant fluctuations  - HR control - aim for <110;   - hold full AC in view of fresh ICH    GI  - cont TF, bowel regimen; PPI for ulcer ppx  LBM 1/2      - alvares replaced 12/27- TOV 1/1  - monitor e-lytes, I/Os, IVf; maintain Na 140-150, BMP daily; cont cardura  - FWF 300cc q6h    Endo  - monitor FS, aim for 120-180  - ISS    ID  - afebrile  - monitor leukocytosis  - culture if febrile  - Completed empiric Zosyn course    Heme  - SCDs,  SQL              69 yr old male h/o afib with acute non-traumatic cerebellar ICH, with vasogenic edema, cerebellar compression; obstructive hydrocephalus.  S/p decompressive SOC 12/21/2024 and 12/27.    Patient is critically ill with high probability of imminent neurologic or life-threatening deterioration due to the following diagnoses:  1) Cerebellar Hemorrhage  2) Obstructive Hydrocephalus  - For which we are monitoring CSF output and adjusting shunt as necessary  3) Respiratory Failure  - For which we are treating with antibiotics and weaning ventilator settings with spontaneous breathing trials as tolerated    Plan:  Neuro  - neurochecks q4h  - empiric Keppra 500mg BID    - DC EEG  - amantadine  - appreciate Palliative recs  - no sedation needed    Pulm  - reintubated 12/23 (day 10 reintubation)  - Osats>92%, normocapnia; vent support    CV  - Hydralazine dec 50 q8h, Valsartan 160, Coreg dec 12.5 bid  - Liberalize -160, avoid significant fluctuations  - HR control - aim for <110;   - hold full AC in view of fresh ICH    GI  - cont TF, bowel regimen; PPI for ulcer ppx  - LBM 1/2      - alvares replaced 12/27- TOV 1/1  - monitor e-lytes, I/Os, IVf; maintain Na 140-150, BMP daily  - cont cardura  - FWF 300cc q6h    Endo  - monitor FS, aim for 120-180  - ISS    ID  - afebrile  - monitor leukocytosis  - culture if febrile  - Completed empiric Zosyn course    Heme  - SCDs,  SQL  - check dopplers with fever

## 2025-01-03 NOTE — CONSULT NOTE ADULT - CONSULT REASON
PEG/TRACH
cerebellar hemorrhage
ICH
R cerebellar ICH s/p SOC x 2, remains off sedation w. no improvement in exam, poor mental status, poor prognosis, needs family support

## 2025-01-03 NOTE — CONSULT NOTE ADULT - ATTENDING COMMENTS
PEG and trach planned for respiratory failure and dysphagia  Care per primary team  I do not need DVT prophylaxis held     Erick Atkins MD FACS  Acute and Critical Care Surgery  Director of Emergency General Surgery @ [facility]  Associate  - General Surgery @ [facility]

## 2025-01-03 NOTE — CONSULT NOTE ADULT - ASSESSMENT
ASSESSMENT: 69 yr old male h/o afib with acute non-traumatic cerebellar ICH, with vasogenic edema, cerebellar compression; 2/2 Cerebellar Hemorrhage, Obstructive Hydrocephalus, pt with also Respiratory Failure      PLAN:    - OR tomorrow for PEG/TRACH   - please hold TF at midnight   - preop accordingly (coags/type and screen/cbc/bmp)   -discussed with Dr Atkins

## 2025-01-03 NOTE — PROGRESS NOTE ADULT - SUBJECTIVE AND OBJECTIVE BOX
Chief complaint:   Patient is a 69y old  Male who presents with a chief complaint of ICH (02 Jan 2025 15:58)    HPI:   69 yr old male history of htn, hld , on afib eliquis with prior ischemic stroke 2018, frequent utis, kidney stones, turp, cabg x 2, 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  went to [place], ich, vomited gcs 13 intubated.  Transferred to [facility] for possible SOC.  On arrival patient is sedated on versed, propofol and fent.    MRS 0  ICH score 3  NIHSS 25 (21 Dec 2024 16:13)        24hr EVENTS:      ROS: [ ]  Unable to assess due to mental status   All other systems negative    -----------------------------------------------------------------------------------------------------------------------------------------------------------------------------------  ICU Vital Signs Last 24 Hrs  T(C): 37.9 (03 Jan 2025 07:00), Max: 38.7 (02 Jan 2025 16:00)  T(F): 100.2 (03 Jan 2025 07:00), Max: 101.7 (02 Jan 2025 16:00)  HR: 73 (03 Jan 2025 08:49) (57 - 95)  BP: 141/64 (03 Jan 2025 07:00) (89/43 - 179/66)  BP(mean): 81 (03 Jan 2025 07:00) (57 - 131)  ABP: 147/49 (03 Jan 2025 07:00) (85/32 - 185/60)  ABP(mean): 77 (03 Jan 2025 07:00) (46 - 99)  RR: 18 (03 Jan 2025 07:00) (10 - 28)  SpO2: 96% (03 Jan 2025 08:49) (93% - 99%)    O2 Parameters below as of 03 Jan 2025 04:00  Patient On (Oxygen Delivery Method): ventilator    O2 Concentration (%): 50        I&O's Summary    02 Jan 2025 07:01  -  03 Jan 2025 07:00  --------------------------------------------------------  IN: 3210 mL / OUT: 2125 mL / NET: 1085 mL        MEDICATIONS  (STANDING):  albuterol    0.083% 2.5 milliGRAM(s) Nebulizer every 6 hours  amantadine Syrup 100 milliGRAM(s) Oral <User Schedule>  amLODIPine   Tablet 10 milliGRAM(s) Oral daily  atorvastatin 40 milliGRAM(s) Oral at bedtime  carvedilol 12.5 milliGRAM(s) Oral every 12 hours  chlorhexidine 0.12% Liquid 15 milliLiter(s) Oral Mucosa every 12 hours  chlorhexidine 2% Cloths 1 Application(s) Topical daily  doxazosin 4 milliGRAM(s) Oral at bedtime  enoxaparin Injectable 40 milliGRAM(s) SubCutaneous <User Schedule>  fentaNYL    Injectable 50 MICROGram(s) IV Push once  folic acid 1 milliGRAM(s) Oral daily  insulin lispro (ADMELOG) corrective regimen sliding scale   SubCutaneous every 6 hours  levETIRAcetam   Injectable 500 milliGRAM(s) IV Push every 12 hours  multivitamin 1 Tablet(s) Oral daily  pantoprazole   Suspension 40 milliGRAM(s) Oral daily  polyethylene glycol 3350 17 Gram(s) Oral daily  senna 2 Tablet(s) Oral at bedtime  thiamine 100 milliGRAM(s) Oral daily  valsartan 160 milliGRAM(s) Oral daily      RESPIRATORY:  Mode: AC/ CMV (Assist Control/ Continuous Mandatory Ventilation)  RR (machine): 16  TV (machine): 500  FiO2: 50  PEEP: 6  ITime: 1  MAP: 9  PIP: 21        IMAGING:   Recent imaging studies were reviewed.    LAB RESULTS:                          11.5   13.45 )-----------( 187      ( 03 Jan 2025 03:25 )             36.0           01-03    144  |  110[H]  |  44.7[H]  ----------------------------<  132[H]  4.3   |  24.0  |  1.22    Ca    8.0[L]      03 Jan 2025 03:25  Phos  3.6     01-03  Mg     2.7     01-03                  Culture - Sputum (collected 01-02-25 @ 15:30)  Source: .Sputum Sputum  Gram Stain (01-02-25 @ 23:04):    Few polymorphonuclear leukocytes per low power field    Rare Squamous epithelial cells per low power field    Numerous Gram positive cocci in pairs per oil power field    Moderate Gram Positive Rods per oil power field    Few Gram Negative Rods per oil power field      -----------------------------------------------------------------------------------------------------------------------------------------------------------------------------------    PHYSICAL EXAM:  General: Calm, intubated  HEENT: MMM  Neuro:  -Mental status- No acute distress  -CN- PERRL 3mm, EOMI, tongue midline, face symmetric    CV: RRR  Pulm: clear to auscultation  Abd: Soft, nontender, nondistended  Ext: no noted edema in lower ext  Skin: warm, dry       Chief complaint:   Patient is a 69y old  Male who presents with a chief complaint of ICH (02 Jan 2025 15:58)    HPI:   69 yr old male history of htn, hld , on afib eliquis with prior ischemic stroke 2018, frequent utis, kidney stones, turp, cabg x 2, 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  went to [place], ich, vomited gcs 13 intubated.  Transferred to [facility] for possible SOC.  On arrival patient is sedated on versed, propofol and fent.    MRS 0  ICH score 3  NIHSS 25 (21 Dec 2024 16:13)    24hr EVENTS:  febrile, cultured  alvares replaced    ROS: [x ]  Unable to assess due to mental status   All other systems negative    -----------------------------------------------------------------------------------------------------------------------------------------------------------------------------------  ICU Vital Signs Last 24 Hrs  T(C): 37.9 (03 Jan 2025 07:00), Max: 38.7 (02 Jan 2025 16:00)  T(F): 100.2 (03 Jan 2025 07:00), Max: 101.7 (02 Jan 2025 16:00)  HR: 73 (03 Jan 2025 08:49) (57 - 95)  BP: 141/64 (03 Jan 2025 07:00) (89/43 - 179/66)  BP(mean): 81 (03 Jan 2025 07:00) (57 - 131)  ABP: 147/49 (03 Jan 2025 07:00) (85/32 - 185/60)  ABP(mean): 77 (03 Jan 2025 07:00) (46 - 99)  RR: 18 (03 Jan 2025 07:00) (10 - 28)  SpO2: 96% (03 Jan 2025 08:49) (93% - 99%)    O2 Parameters below as of 03 Jan 2025 04:00  Patient On (Oxygen Delivery Method): ventilator    O2 Concentration (%): 50        I&O's Summary    02 Jan 2025 07:01  -  03 Jan 2025 07:00  --------------------------------------------------------  IN: 3210 mL / OUT: 2125 mL / NET: 1085 mL        MEDICATIONS  (STANDING):  albuterol    0.083% 2.5 milliGRAM(s) Nebulizer every 6 hours  amantadine Syrup 100 milliGRAM(s) Oral <User Schedule>  amLODIPine   Tablet 10 milliGRAM(s) Oral daily  atorvastatin 40 milliGRAM(s) Oral at bedtime  carvedilol 12.5 milliGRAM(s) Oral every 12 hours  chlorhexidine 0.12% Liquid 15 milliLiter(s) Oral Mucosa every 12 hours  chlorhexidine 2% Cloths 1 Application(s) Topical daily  doxazosin 4 milliGRAM(s) Oral at bedtime  enoxaparin Injectable 40 milliGRAM(s) SubCutaneous <User Schedule>  fentaNYL    Injectable 50 MICROGram(s) IV Push once  folic acid 1 milliGRAM(s) Oral daily  insulin lispro (ADMELOG) corrective regimen sliding scale   SubCutaneous every 6 hours  levETIRAcetam   Injectable 500 milliGRAM(s) IV Push every 12 hours  multivitamin 1 Tablet(s) Oral daily  pantoprazole   Suspension 40 milliGRAM(s) Oral daily  polyethylene glycol 3350 17 Gram(s) Oral daily  senna 2 Tablet(s) Oral at bedtime  thiamine 100 milliGRAM(s) Oral daily  valsartan 160 milliGRAM(s) Oral daily      RESPIRATORY:  Mode: AC/ CMV (Assist Control/ Continuous Mandatory Ventilation)  RR (machine): 16  TV (machine): 500  FiO2: 50  PEEP: 6  ITime: 1  MAP: 9  PIP: 21        IMAGING:   Recent imaging studies were reviewed.    LAB RESULTS:                          11.5   13.45 )-----------( 187      ( 03 Jan 2025 03:25 )             36.0       01-03    144  |  110[H]  |  44.7[H]  ----------------------------<  132[H]  4.3   |  24.0  |  1.22    Ca    8.0[L]      03 Jan 2025 03:25  Phos  3.6     01-03  Mg     2.7     01-03      Culture - Sputum (collected 01-02-25 @ 15:30)  Source: .Sputum Sputum  Gram Stain (01-02-25 @ 23:04):    Few polymorphonuclear leukocytes per low power field    Rare Squamous epithelial cells per low power field    Numerous Gram positive cocci in pairs per oil power field    Moderate Gram Positive Rods per oil power field    Few Gram Negative Rods per oil power field      -----------------------------------------------------------------------------------------------------------------------------------------------------------------------------------    PHYSICAL EXAM:  General: Calm, intubated  HEENT: MMM  Neuro:  -Mental status- No acute distress, no EO, grimaces to nox  no FC  -CN- PERRL 3mm, EOMI, tongue midline  +cough/gag/corneals  bilat upper weak WD  bilat lower ext TF    CV: RRR  Pulm: clear to auscultation  Abd: Soft, nontender, nondistended  Ext: no noted edema in lower ext  Skin: warm, dry

## 2025-01-03 NOTE — CONSULT NOTE ADULT - SUBJECTIVE AND OBJECTIVE BOX
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  went to [place], ich, vomited gcs 13 intubated.  Transferred to [facility] for possible SOC.  On arrival patient is sedated on versed, propofol and fent."     ROS: 10-system review is otherwise negative except HPI above.      PAST MEDICAL & SURGICAL HISTORY:  Calculus of kidney      Hypertension      Hyperlipidemia      Stroke      History of hip replacement, total, left        FAMILY HISTORY:  Family history of kidney stones (Father)      Family history not pertinent as reviewed with the patient.    SOCIAL HISTORY:  Denies any toxic habits    ALLERGIES: NKA rocuronium (Rash (Severe))      Home Medications:  aspirin 81 mg oral delayed release tablet: 1 tab(s) orally once a day (23 Dec 2024 10:36)  cholecalciferol 125 mcg (5000 intl units) oral tablet: 1 tab(s) orally once a day (23 Dec 2024 10:36)  Eliquis 5 mg oral tablet: 1 tab(s) orally every 12 hours (23 Dec 2024 09:06)  ezetimibe-simvastatin 10 mg-40 mg oral tablet: 1 tab(s) orally once a day (23 Dec 2024 09:07)  Metoprolol Succinate ER 50 mg oral tablet, extended release: 1 tab(s) orally once a day (at bedtime) (23 Dec 2024 09:06)  multivitamin: 1 tab(s) orally once a day (23 Dec 2024 10:36)  ramipril 10 mg oral tablet: 10 tablet orally once a day (23 Dec 2024 09:05)  tamsulosin 0.4 mg oral capsule: 1 cap(s) orally once a day (23 Dec 2024 09:06)      --------------------------------------------------------------------------------------------    PHYSICAL EXAM:   General:intubated /sedated   GI/Abd: Soft, NT/ND    --------------------------------------------------------------------------------------------    LABS                 11.5   13.45  )----------(  187       ( 03 Jan 2025 03:25 )               36.0      144    |  110    |  44.7   ----------------------------<  132        ( 03 Jan 2025 03:25 )  4.3     |  24.0   |  1.22     Ca    8.0        ( 03 Jan 2025 03:25 )  Phos  3.6       ( 03 Jan 2025 03:25 )  Mg     2.7       ( 03 Jan 2025 03:25 )            CAPILLARY BLOOD GLUCOSE        Urinalysis Basic - ( 03 Jan 2025 03:25 )    Color: x / Appearance: x / SG: x / pH: x  Gluc: 132 mg/dL / Ketone: x  / Bili: x / Urobili: x   Blood: x / Protein: x / Nitrite: x   Leuk Esterase: x / RBC: x / WBC x   Sq Epi: x / Non Sq Epi: x / Bacteria: x          --------------------------------------------------------------------------------------------

## 2025-01-03 NOTE — PROGRESS NOTE ADULT - ASSESSMENT
69 year old male PMHx afib (on eliquis with prior ischemic stroke 2018), HTN, CABG x 2, ETOH use transferred from [place] for ICH. Patient admitted to NEURO ICU for monitoring, Palliative consulted for support as patient condition not improving, no gag/cough reflex and poor neuro exam.      Acute Respiratory Failure  vent dependent  family deciding trach/peg    ICH  NEURO ICU management  Noted CT Head 12.27.24/12.28.24    Fever  likely central  monitor  wbc stable    Debility  Assist in ADLS  Maintain safety, fall, aspiration precautions  turn reposition    Encounter for Palliative Care  See GO note above for details.  In summary  1. Wife has decided trach/peg - hopes to give more time to see if  can recover.  2.  Answered wife's questions about withdrawing LST in the future if he does not make a recovery in line with his wishes of  QOL.     Wife explained once trach/peg,  will need vent facility .   Psychosocial support

## 2025-01-03 NOTE — PROGRESS NOTE ADULT - SUBJECTIVE AND OBJECTIVE BOX
CC:  Follow up GOC , Symptoms    OVERNIGHT EVENTS:  remains intubated  + fevers    Present Symptoms:   Dyspnea:  No    Nausea/Vomiting:  No    Anxiety/ Agitation No    Depression:  Unable to assess  Fatigue:  Unable  to assess  Loss of appetite: r  NA   Constipation: Not Reported   Pain:  No Signs            Location            Duration            Character            Severity            Factors            Effect    Pain AD Score:  [email or web address]/[email or web address] (press ctrl + left click to view)    Review of Systems: Reviewed as above  Further ROS unable limited to  obtain due to poor mentation   historian    MEDICATIONS  (STANDING):  amantadine Syrup 100 milliGRAM(s) Oral <User Schedule>  amLODIPine   Tablet 10 milliGRAM(s) Oral daily  atorvastatin 40 milliGRAM(s) Oral at bedtime  bromocriptine Capsule 5 milliGRAM(s) Oral every 8 hours  carvedilol 12.5 milliGRAM(s) Oral every 12 hours  chlorhexidine 0.12% Liquid 15 milliLiter(s) Oral Mucosa every 12 hours  chlorhexidine 2% Cloths 1 Application(s) Topical daily  doxazosin 4 milliGRAM(s) Oral at bedtime  enoxaparin Injectable 40 milliGRAM(s) SubCutaneous <User Schedule>  folic acid 1 milliGRAM(s) Oral daily  insulin lispro (ADMELOG) corrective regimen sliding scale   SubCutaneous every 6 hours  levETIRAcetam   Injectable 500 milliGRAM(s) IV Push every 12 hours  multivitamin 1 Tablet(s) Oral daily  nystatin Ointment 1 Application(s) Topical two times a day  pantoprazole   Suspension 40 milliGRAM(s) Oral daily  polyethylene glycol 3350 17 Gram(s) Oral daily  senna 2 Tablet(s) Oral at bedtime  thiamine 100 milliGRAM(s) Oral daily  valsartan 80 milliGRAM(s) Oral <User Schedule>    MEDICATIONS  (PRN):  acetaminophen     Tablet .. 650 milliGRAM(s) Oral every 6 hours PRN Temp greater or equal to 38C (100.4F)  acetylcysteine 10%  Inhalation 4 milliLiter(s) Inhalation every 6 hours PRN Bronchospasm  albuterol    0.083% 2.5 milliGRAM(s) Nebulizer every 6 hours PRN Shortness of Breath and/or Wheezing  hydrALAZINE Injectable 10 milliGRAM(s) IV Push every 2 hours PRN SBP>160  ondansetron Injectable 4 milliGRAM(s) IV Push every 6 hours PRN Nausea and/or Vomiting      PHYSICAL EXAM:  Vital Signs Last 24 Hrs  T(C): 37.6 (03 Jan 2025 10:00), Max: 38.7 (02 Jan 2025 16:00)  T(F): 99.7 (03 Jan 2025 10:00), Max: 101.7 (02 Jan 2025 16:00)  HR: 68 (03 Jan 2025 11:50) (57 - 95)  BP: 141/64 (03 Jan 2025 07:00) (89/43 - 179/66)  BP(mean): 81 (03 Jan 2025 07:00) (57 - 131)  RR: 20 (03 Jan 2025 10:00) (10 - 28)  SpO2: 97% (03 Jan 2025 11:50) (93% - 99%)    Parameters below as of 03 Jan 2025 04:00  Patient On (Oxygen Delivery Method): ventilator    O2 Concentration (%): 50    Karnofsky: 10 %  General: Elderly man intubated        HEENT:  NCAT       ( x )  ET tube   Lungs: comfortable  non labored  CV:  RR  GI:   soft NTND  MSK: bb  Skin:  warm/dry  Neuro  NR  Psych NA    LABS:                          11.5   13.45 )-----------( 187      ( 03 Jan 2025 03:25 )             36.0     01-03    144  |  110[H]  |  44.7[H]  ----------------------------<  132[H]  4.3   |  24.0  |  1.22    Ca    8.0[L]      03 Jan 2025 03:25  Phos  3.6     01-03  Mg     2.7     01-03        Urinalysis Basic - ( 03 Jan 2025 03:25 )    Color: x / Appearance: x / SG: x / pH: x  Gluc: 132 mg/dL / Ketone: x  / Bili: x / Urobili: x   Blood: x / Protein: x / Nitrite: x   Leuk Esterase: x / RBC: x / WBC x   Sq Epi: x / Non Sq Epi: x / Bacteria: x      I&O's Summary    02 Jan 2025 07:01  -  03 Jan 2025 07:00  --------------------------------------------------------  IN: 3240 mL / OUT: 2190 mL / NET: 1050 mL    03 Jan 2025 07:01  -  03 Jan 2025 13:41  --------------------------------------------------------  IN: 90 mL / OUT: 275 mL / NET: -185 mL        RADIOLOGY & ADDITIONAL STUDIES:  Imaging Reviewed  ( x  )   < from: CT Head No Cont (01.01.25 @ 12:37) >    ACC: [id number] EXAM:  CT BRAIN   ORDERED BY: SHADY WATERS     PROCEDURE DATE:  01/01/2025          INTERPRETATION:  CLINICAL INFORMATION: Stability scan    COMPARISON: Head CT performed earlier the same day at 4:58 AM.    Multiple axial sections were performed from the base of skull to vertex   without contrast enhancement. Coronal sagittal instructions were performed    Postoperative changes compatible with bilateral suboccipital craniectomy   is again identified. Abnormal low-attenuationinvolving the postop region   is again seen with some surrounding edema. This finding appears   unchanged. 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 This finding measures approximately 3.2 cm   widest diameter and previously measured approximately 3.2 cm in widest   diameter.    The size and configuration of ventricles appear unchanged.   Intraventricular hemorrhage is again seen.      Mixed attenuated subdural hematomas are again identified in thebifrontal   region. These findings appear stable.    Evaluation of the osseous structures aside from postop changes appear   normal    Left-sided maxillary sinus mucosal thickening is seen Air-fluid level   mucosal thickening seen involving left sphenoid sinus. Air-fluid level is   seen involving right sphenoid sinus Bilateral ethmoid sinus mucosal   thickening is seen    Both mastoid regions demonstrate partial opacification. Skin staples seen   in the postop region.    IMPRESSION: Stable exam.    --- End of Report ---        < end of copied text >            ADVANCE DIRECTIVE PREFERENCES    Full Code

## 2025-01-03 NOTE — PROGRESS NOTE ADULT - ASSESSMENT
69 yr old male h/o afib with acute non-traumatic cerebellar ICH, with vasogenic edema, cerebellar compression; obstructive hydrocephalus.  S/p decompressive SOC 12/21/2024 and 12/27.    Patient is critically ill with high probability of imminent neurologic or life-threatening deterioration due to the following diagnoses:  1) Cerebellar Hemorrhage  2) Obstructive Hydrocephalus  - For which we are monitoring CSF output and adjusting shunt as necessary  3) Respiratory Failure  - For which we are treating with antibiotics and weaning ventilator settings with spontaneous breathing trials as tolerated    Plan:  Neuro  - neurochecks q4h  - empiric Keppra 500mg BID    - amantadine  - appreciate Palliative recs  - no sedation needed    Pulm  - reintubated 12/23 (day 10 reintubation)  - Osats>92%, normocapnia; vent support    CV  - Hydralazine dec 50 q8h, Valsartan 160, Coreg dec 12.5 bid  - Liberalize -160, avoid significant fluctuations  - HR control - aim for <110;   - hold full AC in view of fresh ICH    GI  - cont TF, bowel regimen; PPI for ulcer ppx  - LBM 1/2      - alvares replaced 12/27 and 1/2, failed TOVs  - monitor e-lytes, I/Os, IVf; maintain Na 140-150, BMP daily  - cont cardura  - FWF 300cc q6h    Endo  - monitor FS, aim for 120-180  - ISS    ID  - afebrile  - monitor leukocytosis  - culture if febrile  - Completed empiric Zosyn course    Heme  - SCDs,  SQL  - check dopplers with fever            69 yr old male h/o afib with acute non-traumatic cerebellar ICH, with vasogenic edema, cerebellar compression; obstructive hydrocephalus.  S/p decompressive SOC 12/21/2024 and 12/27.    Patient is critically ill with high probability of imminent neurologic or life-threatening deterioration due to the following diagnoses:  1) Cerebellar Hemorrhage  2) Obstructive Hydrocephalus  - For which we are monitoring CSF output and adjusting shunt as necessary  3) Respiratory Failure  - For which we are treating with antibiotics and weaning ventilator settings with spontaneous breathing trials as tolerated    Plan:  Neuro  - neurochecks q4h  - empiric Keppra 500mg BID    - amantadine  - trial bromocriptine 5mg  - appreciate Palliative recs  - no sedation needed    Pulm  - reintubated 12/23 (day 10 reintubation)  - Osats>92%, normocapnia; vent support  - mucomyst prn    CV  - stop Hydralazine, Valsartan dec 80, coreg 12.5 bid  - Liberalize -160   - HR control - aim for <110  - hold full AC in view of fresh ICH    GI  - cont TF, bowel regimen; PPI for ulcer ppx  - LBM 1/2      - alvares replaced 12/27 and 1/2, failed TOVs  - monitor e-lytes, I/Os, IVf; maintain Na 140-150, BMP daily  - cont cardura  - dec FWF 300cc q8h    Endo  - monitor FS, aim for 120-180  - ISS    ID  - febrile  - monitor leukocytosis  - Completed empiric Zosyn course    Heme  - SCDs,  SQL  - L basilic and cephalic thrombosis

## 2025-01-04 NOTE — PROGRESS NOTE ADULT - SUBJECTIVE AND OBJECTIVE BOX
Subjective: Patient seen at bedside, no overnight events.     MEDICATIONS  (STANDING):  amantadine Syrup 100 milliGRAM(s) Oral <User Schedule>  amLODIPine   Tablet 10 milliGRAM(s) Oral daily  atorvastatin 40 milliGRAM(s) Oral at bedtime  bromocriptine Capsule 5 milliGRAM(s) Oral every 8 hours  carvedilol 6.25 milliGRAM(s) Oral <User Schedule>  chlorhexidine 0.12% Liquid 15 milliLiter(s) Oral Mucosa every 12 hours  chlorhexidine 2% Cloths 1 Application(s) Topical daily  doxazosin 4 milliGRAM(s) Oral at bedtime  folic acid 1 milliGRAM(s) Oral daily  insulin lispro (ADMELOG) corrective regimen sliding scale   SubCutaneous every 6 hours  levETIRAcetam   Injectable 500 milliGRAM(s) IV Push every 12 hours  multivitamin 1 Tablet(s) Oral daily  nystatin Ointment 1 Application(s) Topical two times a day  pantoprazole   Suspension 40 milliGRAM(s) Oral daily  thiamine 100 milliGRAM(s) Oral daily  valsartan 80 milliGRAM(s) Oral <User Schedule>    MEDICATIONS  (PRN):  acetaminophen     Tablet .. 650 milliGRAM(s) Oral every 6 hours PRN Temp greater or equal to 38C (100.4F)  acetylcysteine 10%  Inhalation 4 milliLiter(s) Inhalation every 6 hours PRN Bronchospasm  albuterol    0.083% 2.5 milliGRAM(s) Nebulizer every 6 hours PRN Shortness of Breath and/or Wheezing  hydrALAZINE Injectable 10 milliGRAM(s) IV Push every 2 hours PRN SBP>160  ondansetron Injectable 4 milliGRAM(s) IV Push every 6 hours PRN Nausea and/or Vomiting    Vital Signs Last 24 Hrs  T(C): 38.1 (04 Jan 2025 01:00), Max: 38.6 (03 Jan 2025 23:30)  T(F): 100.6 (04 Jan 2025 01:00), Max: 101.5 (03 Jan 2025 23:30)  HR: 64 (04 Jan 2025 01:00) (57 - 80)  BP: 125/54 (04 Jan 2025 01:00) (89/43 - 179/66)  BP(mean): 75 (04 Jan 2025 01:00) (57 - 96)  RR: 10 (04 Jan 2025 01:00) (10 - 25)  SpO2: 97% (04 Jan 2025 01:00) (93% - 98%)  Parameters below as of 04 Jan 2025 00:00  Patient On (Oxygen Delivery Method): ventilator    Physical Exam:  Constitutional: NAD  HEENT: PERRL, EOMI  Neck: No JVD, FROM without pain  Respiratory: Respirations non-labored, no accessory muscle use    LABS: pending     A: Patient is a 70 yo M with acute non-traumatic cerebellar ICH, with vasogenic edema, cerebellar compression; 2/2 Cerebellar Hemorrhage, Obstructive Hydrocephalus, pt with also Respiratory Failure, planned for a trach/peg today.     PLAN:    OR today for trach/peg   please hold TF at midnight

## 2025-01-04 NOTE — PROGRESS NOTE ADULT - SUBJECTIVE AND OBJECTIVE BOX
Chief complaint:   Patient is a 69y old  Male who presents with a chief complaint of ICH (04 Jan 2025 01:44)    HPI:   69 yr old male history of htn, hld , on afib eliquis with prior ischemic stroke 2018, frequent utis, kidney stones, turp, cabg x 2, 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  went to [place], ich, vomited gcs 13 intubated.  Transferred to [facility] for possible SOC.  On arrival patient is sedated on versed, propofol and fent.    MRS 0  ICH score 3  NIHSS 25 (21 Dec 2024 16:13)        24hr EVENTS:      ROS: [ ]  Unable to assess due to mental status   All other systems negative    -----------------------------------------------------------------------------------------------------------------------------------------------------------------------------------  ICU Vital Signs Last 24 Hrs  T(C): 37.4 (04 Jan 2025 09:00), Max: 38.6 (03 Jan 2025 23:30)  T(F): 99.3 (04 Jan 2025 09:00), Max: 101.5 (03 Jan 2025 23:30)  HR: 68 (04 Jan 2025 09:00) (57 - 80)  BP: 124/53 (04 Jan 2025 09:00) (120/50 - 163/73)  BP(mean): 74 (04 Jan 2025 09:00) (71 - 96)  ABP: 120/40 (04 Jan 2025 09:00) (75/32 - 172/50)  ABP(mean): 64 (04 Jan 2025 09:00) (44 - 150)  RR: 15 (04 Jan 2025 09:00) (10 - 25)  SpO2: 98% (04 Jan 2025 09:00) (93% - 100%)    O2 Parameters below as of 04 Jan 2025 08:50  Patient On (Oxygen Delivery Method): ventilator            I&O's Summary    03 Jan 2025 07:01  -  04 Jan 2025 07:00  --------------------------------------------------------  IN: 2270 mL / OUT: 2570 mL / NET: -300 mL    04 Jan 2025 07:01  -  04 Jan 2025 09:42  --------------------------------------------------------  IN: 0 mL / OUT: 200 mL / NET: -200 mL        MEDICATIONS  (STANDING):  amantadine Syrup 100 milliGRAM(s) Oral <User Schedule>  amLODIPine   Tablet 10 milliGRAM(s) Oral daily  atorvastatin 40 milliGRAM(s) Oral at bedtime  bromocriptine Capsule 5 milliGRAM(s) Oral every 8 hours  carvedilol 6.25 milliGRAM(s) Oral <User Schedule>  chlorhexidine 0.12% Liquid 15 milliLiter(s) Oral Mucosa every 12 hours  chlorhexidine 2% Cloths 1 Application(s) Topical daily  doxazosin 4 milliGRAM(s) Oral at bedtime  folic acid 1 milliGRAM(s) Oral daily  insulin lispro (ADMELOG) corrective regimen sliding scale   SubCutaneous every 6 hours  levETIRAcetam   Injectable 500 milliGRAM(s) IV Push every 12 hours  multivitamin 1 Tablet(s) Oral daily  nystatin Ointment 1 Application(s) Topical two times a day  pantoprazole   Suspension 40 milliGRAM(s) Oral daily  thiamine 100 milliGRAM(s) Oral daily  valsartan 80 milliGRAM(s) Oral <User Schedule>      RESPIRATORY:  Mode: AC/ CMV (Assist Control/ Continuous Mandatory Ventilation)  RR (machine): 16  TV (machine): 500  FiO2: 40  PEEP: 6  ITime: 1.2  MAP: 12  PIP: 30        IMAGING:   Recent imaging studies were reviewed.    LAB RESULTS:                          10.7   12.93 )-----------( 179      ( 04 Jan 2025 03:00 )             33.8       PT/INR - ( 04 Jan 2025 03:00 )   PT: 13.5 sec;   INR: 1.20 ratio         PTT - ( 04 Jan 2025 03:00 )  PTT:26.4 sec    01-04    145  |  112[H]  |  39.4[H]  ----------------------------<  176[H]  4.1   |  24.0  |  1.04    Ca    7.8[L]      04 Jan 2025 03:00  Phos  2.8     01-04  Mg     2.8     01-04                  -----------------------------------------------------------------------------------------------------------------------------------------------------------------------------------    PHYSICAL EXAM:  General: Calm, intubated  HEENT: MMM  Neuro:  -Mental status- No acute distress  -CN- PERRL 3mm, EOMI, tongue midline, face symmetric    CV: RRR  Pulm: clear to auscultation  Abd: Soft, nontender, nondistended  Ext: no noted edema in lower ext  Skin: warm, dry       Chief complaint:   Patient is a 69y old  Male who presents with a chief complaint of ICH (04 Jan 2025 01:44)    HPI:   69 yr old male history of htn, hld , on afib eliquis with prior ischemic stroke 2018, frequent utis, kidney stones, turp, cabg x 2, 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  went to [place], ich, vomited gcs 13 intubated.  Transferred to [facility] for possible SOC.  On arrival patient is sedated on versed, propofol and fent.    MRS 0  ICH score 3  NIHSS 25 (21 Dec 2024 16:13)    24hr EVENTS:  febrile  labile hypotension    ROS: [x ]  Unable to assess due to mental status   All other systems negative    -----------------------------------------------------------------------------------------------------------------------------------------------------------------------------------  ICU Vital Signs Last 24 Hrs  T(C): 37.4 (04 Jan 2025 09:00), Max: 38.6 (03 Jan 2025 23:30)  T(F): 99.3 (04 Jan 2025 09:00), Max: 101.5 (03 Jan 2025 23:30)  HR: 68 (04 Jan 2025 09:00) (57 - 80)  BP: 124/53 (04 Jan 2025 09:00) (120/50 - 163/73)  BP(mean): 74 (04 Jan 2025 09:00) (71 - 96)  ABP: 120/40 (04 Jan 2025 09:00) (75/32 - 172/50)  ABP(mean): 64 (04 Jan 2025 09:00) (44 - 150)  RR: 15 (04 Jan 2025 09:00) (10 - 25)  SpO2: 98% (04 Jan 2025 09:00) (93% - 100%)    O2 Parameters below as of 04 Jan 2025 08:50  Patient On (Oxygen Delivery Method): ventilator            I&O's Summary    03 Jan 2025 07:01  -  04 Jan 2025 07:00  --------------------------------------------------------  IN: 2270 mL / OUT: 2570 mL / NET: -300 mL    04 Jan 2025 07:01  -  04 Jan 2025 09:42  --------------------------------------------------------  IN: 0 mL / OUT: 200 mL / NET: -200 mL        MEDICATIONS  (STANDING):  amantadine Syrup 100 milliGRAM(s) Oral <User Schedule>  amLODIPine   Tablet 10 milliGRAM(s) Oral daily  atorvastatin 40 milliGRAM(s) Oral at bedtime  bromocriptine Capsule 5 milliGRAM(s) Oral every 8 hours  carvedilol 6.25 milliGRAM(s) Oral <User Schedule>  chlorhexidine 0.12% Liquid 15 milliLiter(s) Oral Mucosa every 12 hours  chlorhexidine 2% Cloths 1 Application(s) Topical daily  doxazosin 4 milliGRAM(s) Oral at bedtime  folic acid 1 milliGRAM(s) Oral daily  insulin lispro (ADMELOG) corrective regimen sliding scale   SubCutaneous every 6 hours  levETIRAcetam   Injectable 500 milliGRAM(s) IV Push every 12 hours  multivitamin 1 Tablet(s) Oral daily  nystatin Ointment 1 Application(s) Topical two times a day  pantoprazole   Suspension 40 milliGRAM(s) Oral daily  thiamine 100 milliGRAM(s) Oral daily  valsartan 80 milliGRAM(s) Oral <User Schedule>      RESPIRATORY:  Mode: AC/ CMV (Assist Control/ Continuous Mandatory Ventilation)  RR (machine): 16  TV (machine): 500  FiO2: 40  PEEP: 6  ITime: 1.2  MAP: 12  PIP: 30        IMAGING:   Recent imaging studies were reviewed.    LAB RESULTS:                          10.7 12.93 )-----------( 179      ( 04 Jan 2025 03:00 )             33.8       PT/INR - ( 04 Jan 2025 03:00 )   PT: 13.5 sec;   INR: 1.20 ratio         PTT - ( 04 Jan 2025 03:00 )  PTT:26.4 sec    01-04    145  |  112[H]  |  39.4[H]  ----------------------------<  176[H]  4.1   |  24.0  |  1.04    Ca    7.8[L]      04 Jan 2025 03:00  Phos  2.8     01-04  Mg     2.8     01-04        -----------------------------------------------------------------------------------------------------------------------------------------------------------------------------------    PHYSICAL EXAM:  General: Calm, intubated  HEENT: MMM  Neuro:  -Mental status- No acute distress, no EO, grimaces to nox  no FC  -CN- PERRL 3mm, EOMI, tongue midline  +cough/gag/corneals  bilat upper weak WD  bilat lower ext TF    CV: RRR  Pulm: clear to auscultation  Abd: Soft, nontender, nondistended  Ext: no noted edema in lower ext  Skin: warm, dry

## 2025-01-04 NOTE — PROGRESS NOTE ADULT - ASSESSMENT
69 yr old male h/o afib with acute non-traumatic cerebellar ICH, with vasogenic edema, cerebellar compression; obstructive hydrocephalus.  S/p decompressive SOC 12/21/2024 and 12/27.    Patient is critically ill with high probability of imminent neurologic or life-threatening deterioration due to the following diagnoses:  1) Cerebellar Hemorrhage  2) Obstructive Hydrocephalus  - For which we are monitoring CSF output and adjusting shunt as necessary  3) Respiratory Failure  - For which we are treating with antibiotics and weaning ventilator settings with spontaneous breathing trials as tolerated    Plan:  Neuro  - neurochecks q4h  - empiric Keppra 500mg BID    - amantadine  - trial bromocriptine 5mg  - appreciate Palliative recs  - no sedation needed    Pulm  - reintubated 12/23 (day 10 reintubation)  - Osats>92%, normocapnia; vent support  - mucomyst prn    CV  - stop Hydralazine, Valsartan dec 80, coreg 12.5 bid  - Liberalize -160   - HR control - aim for <110  - hold full AC in view of fresh ICH    GI  - cont TF, bowel regimen; PPI for ulcer ppx  - LBM 1/2      - alvares replaced 12/27 and 1/2, failed TOVs  - monitor e-lytes, I/Os, IVf; maintain Na 140-150, BMP daily  - cont cardura  - dec FWF 300cc q8h    Endo  - monitor FS, aim for 120-180  - ISS    ID  - febrile  - monitor leukocytosis  - Completed empiric Zosyn course    Heme  - SCDs,  SQL  - L basilic and cephalic thrombosis            69 yr old male h/o afib with acute non-traumatic cerebellar ICH, with vasogenic edema, cerebellar compression; obstructive hydrocephalus.  S/p decompressive SOC 12/21/2024 and 12/27.    Patient is critically ill with high probability of imminent neurologic or life-threatening deterioration due to the following diagnoses:  1) Cerebellar Hemorrhage  2) Obstructive Hydrocephalus  - For which we are monitoring CSF output and adjusting shunt as necessary  3) Respiratory Failure  - For which we are treating with antibiotics and weaning ventilator settings with spontaneous breathing trials as tolerated    Plan:  Neuro  - neurochecks q4h  - dc Keppra 500mg BID    - amantadine  - inc bromocriptine 10mg  - appreciate Palliative recs  - no sedation needed    Pulm  - reintubated 12/23 (day 11 reintubation)  - Osats>92%, normocapnia; vent support  - mucomyst prn  - trach/ peg today    CV  - Valsartan 80, coreg 6.25 bid, norvasc  - Liberalize -160   - HR control - aim for <110  - hold full AC in view of fresh ICH- at least 2 weeks    GI  - cont TF, bowel regimen; PPI for ulcer ppx  - loose stools with dignicare      - alvares replaced 12/27 and 1/2, failed TOVs  - monitor e-lytes, I/Os, IVf; maintain Na 140-150, BMP daily  - cont cardura  - FWF 300cc q8h    Endo  - monitor glucose, aim for 120-180    ID  - febrile  - monitor leukocytosis  - Completed empiric Zosyn course    Heme  - SCDs,  SQL  - L basilic and cephalic thrombosis- repeat in 7 days

## 2025-01-05 NOTE — PROGRESS NOTE ADULT - SUBJECTIVE AND OBJECTIVE BOX
Chief complaint:   Patient is a 69y old  Male who presents with a chief complaint of ICH (04 Jan 2025 09:42)    HPI:   69 yr old male history of htn, hld , on afib eliquis with prior ischemic stroke 2018, frequent utis, kidney stones, turp, cabg x 2, 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  went to [place], ich, vomited gcs 13 intubated.  Transferred to [facility] for possible SOC.  On arrival patient is sedated on versed, propofol and fent.    MRS 0  ICH score 3  NIHSS 25 (21 Dec 2024 16:13)        24hr EVENTS:      ROS: [ ]  Unable to assess due to mental status   All other systems negative    -----------------------------------------------------------------------------------------------------------------------------------------------------------------------------------  ICU Vital Signs Last 24 Hrs  T(C): 37.5 (05 Jan 2025 10:00), Max: 38.5 (05 Jan 2025 04:22)  T(F): 99.5 (05 Jan 2025 10:00), Max: 101.3 (05 Jan 2025 04:22)  HR: 67 (05 Jan 2025 10:00) (56 - 78)  BP: 160/77 (05 Jan 2025 10:00) (96/54 - 175/85)  BP(mean): 105 (05 Jan 2025 10:00) (65 - 108)  ABP: 158/58 (05 Jan 2025 10:00) (88/35 - 164/98)  ABP(mean): 91 (05 Jan 2025 10:00) (51 - 136)  RR: 16 (05 Jan 2025 10:00) (10 - 26)  SpO2: 99% (05 Jan 2025 10:00) (95% - 100%)    O2 Parameters below as of 05 Jan 2025 09:54  Patient On (Oxygen Delivery Method): ventilator            I&O's Summary    04 Jan 2025 07:01  -  05 Jan 2025 07:00  --------------------------------------------------------  IN: 2220 mL / OUT: 1995 mL / NET: 225 mL    05 Jan 2025 07:01  -  05 Jan 2025 10:22  --------------------------------------------------------  IN: 390 mL / OUT: 50 mL / NET: 340 mL        MEDICATIONS  (STANDING):  amantadine Syrup 100 milliGRAM(s) Oral <User Schedule>  amLODIPine   Tablet 10 milliGRAM(s) Oral daily  atorvastatin 40 milliGRAM(s) Oral at bedtime  bromocriptine Tablet 10 milliGRAM(s) Oral every 8 hours  carvedilol 6.25 milliGRAM(s) Oral <User Schedule>  chlorhexidine 0.12% Liquid 15 milliLiter(s) Oral Mucosa every 12 hours  chlorhexidine 2% Cloths 1 Application(s) Topical daily  doxazosin 4 milliGRAM(s) Oral at bedtime  folic acid 1 milliGRAM(s) Oral daily  multivitamin 1 Tablet(s) Oral daily  nystatin Ointment 1 Application(s) Topical two times a day  pantoprazole   Suspension 40 milliGRAM(s) Oral daily  thiamine 100 milliGRAM(s) Oral daily  valsartan 80 milliGRAM(s) Oral <User Schedule>      RESPIRATORY:  Mode: AC/ CMV (Assist Control/ Continuous Mandatory Ventilation)  RR (machine): 16  TV (machine): 0.5  FiO2: 40  PEEP: 6  ITime: 1.2  MAP: 18  PIP: 22        IMAGING:   Recent imaging studies were reviewed.    LAB RESULTS:                          9.9    10.91 )-----------( 193      ( 05 Jan 2025 05:25 )             31.8       PT/INR - ( 04 Jan 2025 03:00 )   PT: 13.5 sec;   INR: 1.20 ratio         PTT - ( 04 Jan 2025 03:00 )  PTT:26.4 sec    01-05    143  |  112[H]  |  37.3[H]  ----------------------------<  165[H]  4.4   |  23.0  |  1.08    Ca    7.8[L]      05 Jan 2025 05:25  Phos  3.0     01-05  Mg     2.7     01-05    TPro  5.3[L]  /  Alb  2.0[L]  /  TBili  <0.2[L]  /  DBili  x   /  AST  178[H]  /  ALT  221[H]  /  AlkPhos  71  01-05                -----------------------------------------------------------------------------------------------------------------------------------------------------------------------------------    PHYSICAL EXAM:  General: Calm, intubated  HEENT: MMM  Neuro:  -Mental status- No acute distress  -CN- PERRL 3mm, EOMI, tongue midline, face symmetric    CV: RRR  Pulm: clear to auscultation  Abd: Soft, nontender, nondistended  Ext: no noted edema in lower ext  Skin: warm, dry       Chief complaint:   Patient is a 69y old  Male who presents with a chief complaint of ICH (04 Jan 2025 09:42)    HPI:   69 yr old male history of htn, hld , on afib eliquis with prior ischemic stroke 2018, frequent utis, kidney stones, turp, cabg x 2, 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  went to [place], ich, vomited gcs 13 intubated.  Transferred to [facility] for possible SOC.  On arrival patient is sedated on versed, propofol and fent.    MRS 0  ICH score 3  NIHSS 25 (21 Dec 2024 16:13)    24hr EVENTS:  no acute issues    ROS: [x ]  Unable to assess due to mental status   All other systems negative    -----------------------------------------------------------------------------------------------------------------------------------------------------------------------------------  ICU Vital Signs Last 24 Hrs  T(C): 37.5 (05 Jan 2025 10:00), Max: 38.5 (05 Jan 2025 04:22)  T(F): 99.5 (05 Jan 2025 10:00), Max: 101.3 (05 Jan 2025 04:22)  HR: 67 (05 Jan 2025 10:00) (56 - 78)  BP: 160/77 (05 Jan 2025 10:00) (96/54 - 175/85)  BP(mean): 105 (05 Jan 2025 10:00) (65 - 108)  ABP: 158/58 (05 Jan 2025 10:00) (88/35 - 164/98)  ABP(mean): 91 (05 Jan 2025 10:00) (51 - 136)  RR: 16 (05 Jan 2025 10:00) (10 - 26)  SpO2: 99% (05 Jan 2025 10:00) (95% - 100%)    O2 Parameters below as of 05 Jan 2025 09:54  Patient On (Oxygen Delivery Method): ventilator            I&O's Summary    04 Jan 2025 07:01  -  05 Jan 2025 07:00  --------------------------------------------------------  IN: 2220 mL / OUT: 1995 mL / NET: 225 mL    05 Jan 2025 07:01  -  05 Jan 2025 10:22  --------------------------------------------------------  IN: 390 mL / OUT: 50 mL / NET: 340 mL        MEDICATIONS  (STANDING):  amantadine Syrup 100 milliGRAM(s) Oral <User Schedule>  amLODIPine   Tablet 10 milliGRAM(s) Oral daily  atorvastatin 40 milliGRAM(s) Oral at bedtime  bromocriptine Tablet 10 milliGRAM(s) Oral every 8 hours  carvedilol 6.25 milliGRAM(s) Oral <User Schedule>  chlorhexidine 0.12% Liquid 15 milliLiter(s) Oral Mucosa every 12 hours  chlorhexidine 2% Cloths 1 Application(s) Topical daily  doxazosin 4 milliGRAM(s) Oral at bedtime  folic acid 1 milliGRAM(s) Oral daily  multivitamin 1 Tablet(s) Oral daily  nystatin Ointment 1 Application(s) Topical two times a day  pantoprazole   Suspension 40 milliGRAM(s) Oral daily  thiamine 100 milliGRAM(s) Oral daily  valsartan 80 milliGRAM(s) Oral <User Schedule>      RESPIRATORY:  Mode: AC/ CMV (Assist Control/ Continuous Mandatory Ventilation)  RR (machine): 16  TV (machine): 0.5  FiO2: 40  PEEP: 6  ITime: 1.2  MAP: 18  PIP: 22        IMAGING:   Recent imaging studies were reviewed.    LAB RESULTS:                          9.9    10.91 )-----------( 193      ( 05 Jan 2025 05:25 )             31.8       PT/INR - ( 04 Jan 2025 03:00 )   PT: 13.5 sec;   INR: 1.20 ratio         PTT - ( 04 Jan 2025 03:00 )  PTT:26.4 sec    01-05    143  |  112[H]  |  37.3[H]  ----------------------------<  165[H]  4.4   |  23.0  |  1.08    Ca    7.8[L]      05 Jan 2025 05:25  Phos  3.0     01-05  Mg     2.7     01-05    TPro  5.3[L]  /  Alb  2.0[L]  /  TBili  <0.2[L]  /  DBili  x   /  AST  178[H]  /  ALT  221[H]  /  AlkPhos  71  01-05        -----------------------------------------------------------------------------------------------------------------------------------------------------------------------------------  PHYSICAL EXAM:  General: Calm, intubated  HEENT: MMM  Neuro:  -Mental status- No acute distress, no EO, grimaces to nox  no FC  -CN- PERRL 3mm, EOMI, tongue midline  +cough/gag/corneals  bilat upper weak WD  bilat lower ext TF    CV: RRR  Pulm: clear to auscultation  Abd: Soft, nontender, nondistended  Ext: no noted edema in lower ext  Skin: warm, dry

## 2025-01-05 NOTE — PROGRESS NOTE ADULT - ASSESSMENT
69 yr old male h/o afib with acute non-traumatic cerebellar ICH, with vasogenic edema, cerebellar compression; obstructive hydrocephalus.  S/p decompressive SOC 12/21/2024 and 12/27.    Patient is critically ill with high probability of imminent neurologic or life-threatening deterioration due to the following diagnoses:  1) Cerebellar Hemorrhage  2) Obstructive Hydrocephalus  - For which we are monitoring CSF output and adjusting shunt as necessary  3) Respiratory Failure  - For which we are treating with antibiotics and weaning ventilator settings with spontaneous breathing trials as tolerated    Plan:  Neuro  - neurochecks q4h  - amantadine  - bromocriptine 10mg  - appreciate Palliative recs  - no sedation needed    Pulm  - reintubated 12/23 (day 11 reintubation)  - Osats>92%, normocapnia; vent support  - mucomyst prn  - trach/ peg today    CV  - Valsartan 80, coreg 6.25 bid, norvasc  - Liberalize -160   - HR control - aim for <110  - hold full AC in view of fresh ICH- at least 2 weeks    GI  - cont TF, bowel regimen; PPI for ulcer ppx  - loose stools with dignicare      - alvares replaced 12/27 and 1/2, failed TOVs  - monitor e-lytes, I/Os, IVf; maintain Na 140-150, BMP daily  - cont cardura  - FWF 300cc q8h    Endo  - monitor glucose, aim for 120-180    ID  - febrile  - monitor leukocytosis  - Completed empiric Zosyn course    Heme  - SCDs,  SQL  - L basilic and cephalic thrombosis- repeat in 7 days            69 yr old male h/o afib with acute non-traumatic cerebellar ICH, with vasogenic edema, cerebellar compression; obstructive hydrocephalus.  S/p decompressive SOC 12/21/2024 and 12/27.    Patient is critically ill with high probability of imminent neurologic or life-threatening deterioration due to the following diagnoses:  1) Cerebellar Hemorrhage  2) Obstructive Hydrocephalus  - For which we are monitoring CSF output and adjusting shunt as necessary  3) Respiratory Failure  - For which we are treating with antibiotics and weaning ventilator settings with spontaneous breathing trials as tolerated    Plan:  Neuro  - neurochecks q4h  - hold amantadine with elev LFTs  - start low dose modafinil   - add melatonin qhs  - bromocriptine 10mg  - appreciate Palliative recs  - no sedation needed    Pulm  - reintubated 12/23 (day 11 reintubation)  - SBT as tolerated  - Osats>92%, normocapnia; vent support  - mucomyst prn  - trach/ peg Monday    CV  - inc Valsartan 160, coreg 6.25 bid, norvasc  - off hydralazine  - Liberalize -160   - HR control - aim for <110  - hold full AC in view of fresh ICH- at least 2 weeks    GI   - transaminitis- trend LFTs, minimize hepatotoxic meds  - cont TF, bowel regimen; PPI for ulcer ppx  - NPO at midnight  - loose stools with dignicare      - alvares replaced 12/27 and 1/2, failed TOVs  - monitor e-lytes, I/Os, IVf; maintain Na 140-150, BMP daily  - cont cardura  - FWF 300cc q8h    Endo  - monitor glucose, aim for 120-180    ID  - febrile  - improving leukocytosis  - Completed empiric Zosyn course    Heme  - SCDs,  SQL  - L basilic and cephalic thrombosis- repeat in 7 days

## 2025-01-06 NOTE — PROGRESS NOTE ADULT - NS ATTEND AMEND GEN_ALL_CORE FT
69-year-old male admitted on December 21, 2024, with cerebellar intracerebral hemorrhage, dysphagia, and ventilator-dependent respiratory failure. Underwent craniectomy and clot evacuation on December 21, 2024. Trach and PEG initially planned for tomorrow, however, was postponed due to several emergencies. Will plan for Wednesday. Apologize for the inconvenience
I agree with the above. I personally examined and saw the patient. Sedated, not following commands. MRI brain completed today without acute abnormality. Plan as above.
NISHI Attg:    see above    patient seen and examined with PA staff    agree with above
NISHI Attg:    see above    patient seen and examined with PA staff    agree with above
I agree with the above. I personally examined and saw the patient. Sedated due to agitation, eyes closed, not following commands, minimal movement, head CT this morning without acute abnormality. Continue supportive care per ICU. Plan as above.

## 2025-01-06 NOTE — PROGRESS NOTE ADULT - CRITICAL CARE ATTENDING COMMENT
I spent 60 minutes of critical care time examining patient, reviewing vitals, labs, medications, imaging and discussing with the team goals of care to prevent life-threatening in this patient who is at high risk for deterioration or death due to:  ICH I spent the above minutes of critical care time examining patient, reviewing vitals, labs, medications, imaging and discussing with the team goals of care to prevent life-threatening in this patient who is at high risk for deterioration or death due to:  ICH, respiratory failure.

## 2025-01-06 NOTE — PROGRESS NOTE ADULT - SUBJECTIVE AND OBJECTIVE BOX
CC:  Follow up GOC , Symptoms    OVERNIGHT EVENTS:  planning for trach/peg  weekend events reviewed    Present Symptoms:   Dyspnea:  No    Nausea/Vomiting:  No    Anxiety/ Agitation No    Depression:    Unable to assess  Fatigue:   Unable  to assess  Loss of appetite:  NA   Constipation: Not Reported    Pain No Signs            Location            Duration            Character            Severity            Factors            Effect    Pain AD Score:  [email or web address] (press ctrl + left click to view)    Review of Systems: Reviewed as above  Further ROS unable  to  obtain due to poor mentation      MEDICATIONS  (STANDING):  amLODIPine   Tablet 10 milliGRAM(s) Oral daily  bromocriptine Tablet 10 milliGRAM(s) Oral every 8 hours  carvedilol 6.25 milliGRAM(s) Oral <User Schedule>  chlorhexidine 0.12% Liquid 15 milliLiter(s) Oral Mucosa every 12 hours  chlorhexidine 2% Cloths 1 Application(s) Topical daily  doxazosin 4 milliGRAM(s) Oral at bedtime  enoxaparin Injectable 40 milliGRAM(s) SubCutaneous <User Schedule>  folic acid 1 milliGRAM(s) Oral daily  insulin lispro (ADMELOG) corrective regimen sliding scale   SubCutaneous every 6 hours  melatonin 3 milliGRAM(s) Oral at bedtime  modafinil 100 milliGRAM(s) Oral daily  multivitamin 1 Tablet(s) Oral daily  nystatin Ointment 1 Application(s) Topical two times a day  nystatin Powder 1 Application(s) Topical two times a day  pantoprazole   Suspension 40 milliGRAM(s) Oral daily  povidone iodine 10% Nasal Swab 1 Application(s) Both Nostrils once  thiamine 100 milliGRAM(s) Oral daily  valsartan 160 milliGRAM(s) Oral <User Schedule>    MEDICATIONS  (PRN):  acetylcysteine 10%  Inhalation 4 milliLiter(s) Inhalation every 6 hours PRN Thick secretions  albuterol    0.083% 2.5 milliGRAM(s) Nebulizer every 6 hours PRN Shortness of Breath and/or Wheezing  hydrALAZINE Injectable 10 milliGRAM(s) IV Push every 2 hours PRN SBP>160  ibuprofen  Tablet. 400 milliGRAM(s) Oral every 6 hours PRN Temp greater or equal to 38.3C (100.9F)  labetalol Injectable 10 milliGRAM(s) IV Push every 6 hours PRN Systolic blood pressure >160  ondansetron Injectable 4 milliGRAM(s) IV Push every 6 hours PRN Nausea and/or Vomiting      PHYSICAL EXAM:  Vital Signs Last 24 Hrs  T(C): 38.2 (06 Jan 2025 14:00), Max: 38.4 (06 Jan 2025 02:00)  T(F): 100.8 (06 Jan 2025 14:00), Max: 101.1 (06 Jan 2025 02:00)  HR: 66 (06 Jan 2025 14:00) (56 - 82)  BP: 157/65 (06 Jan 2025 14:00) (106/54 - 165/66)  BP(mean): 90 (06 Jan 2025 14:00) (65 - 95)  RR: 15 (06 Jan 2025 14:00) (10 - 156)  SpO2: 100% (06 Jan 2025 14:00) (98% - 100%)    Parameters below as of 06 Jan 2025 12:01  Patient On (Oxygen Delivery Method): ventilator    Karnofsky: 10 %  General: Elderly man intubated        HEENT:  NCAT       ( x )  ET tube   Lungs: comfortable  non labored  CV:  RR  GI:   soft NTND  MSK: bb  Skin:  warm/dry  Neuro  NR  Psych NA    LABS:                          10.6   11.84 )-----------( 192      ( 06 Jan 2025 02:00 )             33.6     01-06    142  |  111[H]  |  32.6[H]  ----------------------------<  155[H]  4.5   |  22.0  |  0.98    Ca    7.9[L]      06 Jan 2025 02:00  Phos  3.4     01-06  Mg     2.5     01-06    TPro  5.6[L]  /  Alb  2.2[L]  /  TBili  <0.2[L]  /  DBili  0.1  /  AST  192[H]  /  ALT  294[H]  /  AlkPhos  82  01-06    PT/INR - ( 06 Jan 2025 02:00 )   PT: 13.8 sec;   INR: 1.22 ratio         PTT - ( 06 Jan 2025 02:00 )  PTT:27.5 sec  Urinalysis Basic - ( 06 Jan 2025 02:00 )    Color: x / Appearance: x / SG: x / pH: x  Gluc: 155 mg/dL / Ketone: x  / Bili: x / Urobili: x   Blood: x / Protein: x / Nitrite: x   Leuk Esterase: x / RBC: x / WBC x   Sq Epi: x / Non Sq Epi: x / Bacteria: x      I&O's Summary    05 Jan 2025 07:01  -  06 Jan 2025 07:00  --------------------------------------------------------  IN: 2210 mL / OUT: 1940 mL / NET: 270 mL    06 Jan 2025 07:01  -  06 Jan 2025 15:17  --------------------------------------------------------  IN: 210 mL / OUT: 690 mL / NET: -480 mL        RADIOLOGY & ADDITIONAL STUDIES:  Imaging Reviewed  (   )           ADVANCE DIRECTIVE PREFERENCES    Full Code  DNR/I  and continuing medical treatments  DNR with Trial of Intubation and continuing medical treatments   DNR/I  and comfort measures

## 2025-01-06 NOTE — PROGRESS NOTE ADULT - ASSESSMENT
69 year old male PMHx afib (on eliquis with prior ischemic stroke 2018), HTN, CABG x 2, ETOH use transferred from [place] for ICH. Patient admitted to NEURO ICU for monitoring, Palliative consulted for support as patient condition not improving, no gag/cough reflex and poor neuro exam.      Acute Respiratory Failure  vent dependent  trach/peg planned for today    ICH  NEURO ICU management  Noted CT Head 12.27.24/12.28.24    Fevers  likely central  monitor  wbc remain stable    Debility  Assist in ADLS  Maintain safety, fall, aspiration precautions  turn reposition    Encounter for Palliative Care  Plan for trach/peg today

## 2025-01-06 NOTE — PROGRESS NOTE ADULT - SUBJECTIVE AND OBJECTIVE BOX
Subjective: patient evaluated and examined at the bedside. no overnight events. cannot participate in ROS d/t intubated status     MEDICATIONS  (STANDING):  amLODIPine   Tablet 10 milliGRAM(s) Oral daily  bromocriptine Tablet 10 milliGRAM(s) Oral every 8 hours  carvedilol 6.25 milliGRAM(s) Oral <User Schedule>  chlorhexidine 0.12% Liquid 15 milliLiter(s) Oral Mucosa every 12 hours  chlorhexidine 2% Cloths 1 Application(s) Topical daily  doxazosin 4 milliGRAM(s) Oral at bedtime  enoxaparin Injectable 40 milliGRAM(s) SubCutaneous <User Schedule>  folic acid 1 milliGRAM(s) Oral daily  insulin lispro (ADMELOG) corrective regimen sliding scale   SubCutaneous every 6 hours  melatonin 3 milliGRAM(s) Oral at bedtime  modafinil 100 milliGRAM(s) Oral daily  multivitamin 1 Tablet(s) Oral daily  nystatin Ointment 1 Application(s) Topical two times a day  nystatin Powder 1 Application(s) Topical two times a day  pantoprazole   Suspension 40 milliGRAM(s) Oral daily  povidone iodine 10% Nasal Swab 1 Application(s) Both Nostrils once  thiamine 100 milliGRAM(s) Oral daily  valsartan 160 milliGRAM(s) Oral <User Schedule>    MEDICATIONS  (PRN):  acetylcysteine 10%  Inhalation 4 milliLiter(s) Inhalation every 6 hours PRN Bronchospasm  albuterol    0.083% 2.5 milliGRAM(s) Nebulizer every 6 hours PRN Shortness of Breath and/or Wheezing  hydrALAZINE Injectable 10 milliGRAM(s) IV Push every 2 hours PRN SBP>160  ibuprofen  Tablet. 400 milliGRAM(s) Oral every 6 hours PRN Temp greater or equal to 38.5C (101.3F)  labetalol Injectable 10 milliGRAM(s) IV Push every 6 hours PRN Systolic blood pressure >160  ondansetron Injectable 4 milliGRAM(s) IV Push every 6 hours PRN Nausea and/or Vomiting      Vital Signs Last 24 Hrs  T(C): 37.5 (06 Jan 2025 10:00), Max: 38.4 (06 Jan 2025 02:00)  T(F): 99.5 (06 Jan 2025 10:00), Max: 101.1 (06 Jan 2025 02:00)  HR: 58 (06 Jan 2025 10:00) (56 - 82)  BP: 150/66 (06 Jan 2025 10:00) (106/54 - 178/70)  BP(mean): 90 (06 Jan 2025 10:00) (65 - 99)  RR: 16 (06 Jan 2025 10:00) (10 - 156)  SpO2: 100% (06 Jan 2025 10:00) (98% - 100%)    Parameters below as of 06 Jan 2025 09:28  Patient On (Oxygen Delivery Method): ventilator        Physical Exam:    Constitutional: intubated and sedated       LABS:                        10.6   11.84 )-----------( 192      ( 06 Jan 2025 02:00 )             33.6     01-06    142  |  111[H]  |  32.6[H]  ----------------------------<  155[H]  4.5   |  22.0  |  0.98    Ca    7.9[L]      06 Jan 2025 02:00  Phos  3.4     01-06  Mg     2.5     01-06    TPro  5.6[L]  /  Alb  2.2[L]  /  TBili  <0.2[L]  /  DBili  0.1  /  AST  192[H]  /  ALT  294[H]  /  AlkPhos  82  01-06    PT/INR - ( 06 Jan 2025 02:00 )   PT: 13.8 sec;   INR: 1.22 ratio         PTT - ( 06 Jan 2025 02:00 )  PTT:27.5 sec  Urinalysis Basic - ( 06 Jan 2025 02:00 )    Color: x / Appearance: x / SG: x / pH: x  Gluc: 155 mg/dL / Ketone: x  / Bili: x / Urobili: x   Blood: x / Protein: x / Nitrite: x   Leuk Esterase: x / RBC: x / WBC x   Sq Epi: x / Non Sq Epi: x / Bacteria: x        A: 69M vent dependent RF s/p craniectomy for cerebellar ICH. OR today for trach and peg.    Plan:   - OR today   - rest of care per primary team

## 2025-01-06 NOTE — PROGRESS NOTE ADULT - SUBJECTIVE AND OBJECTIVE BOX
Chief complaint:   Patient is a 69y old  Male who presents with a chief complaint of ICH (04 Jan 2025 09:42)    HPI:   69 yr old male history of htn, hld , on afib eliquis with prior ischemic stroke 2018, frequent utis, kidney stones, turp, cabg x 2, 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  went to [place], ich, vomited gcs 13 intubated.  Transferred to [facility] for possible SOC.  On arrival patient is sedated on versed, propofol and fent.    MRS 0  ICH score 3  NIHSS 25 (21 Dec 2024 16:13)    24hr EVENTS:  no acute issues    ROS: [x ]  Unable to assess due to mental status   All other systems negative    -----------------------------------------------------------------------------------------------------------------------------------------------------------------------------------  PHYSICAL EXAM:  General: Calm, intubated  HEENT: MMM  Neuro:  -Mental status- No acute distress, no EO, grimaces to nox  no FC  -CN- PERRL 3mm, EOMI, tongue midline  +cough/gag/corneals  bilat upper weak WD  bilat lower ext TF    CV: RRR  Pulm: clear to auscultation  Abd: Soft, nontender, nondistended  Ext: no noted edema in lower ext  Skin: warm, dry      -----------------------------------------------------------------------------------------------------  ICU Vital Signs Last 24 Hrs  T(C): 37.7 (06 Jan 2025 06:00), Max: 38.4 (06 Jan 2025 02:00)  T(F): 99.9 (06 Jan 2025 06:00), Max: 101.1 (06 Jan 2025 02:00)  HR: 56 (06 Jan 2025 06:00) (56 - 82)  BP: 119/56 (06 Jan 2025 06:00) (106/54 - 178/70)  BP(mean): 75 (06 Jan 2025 06:00) (65 - 105)  ABP: 154/46 (05 Jan 2025 12:38) (143/46 - 173/131)  ABP(mean): 74 (05 Jan 2025 12:38) (74 - 136)  RR: 16 (06 Jan 2025 06:00) (10 - 23)  SpO2: 98% (06 Jan 2025 06:00) (98% - 100%)    O2 Parameters below as of 06 Jan 2025 04:00  Patient On (Oxygen Delivery Method): ventilator    O2 Concentration (%): 40        I&O's Summary    05 Jan 2025 07:01  -  06 Jan 2025 07:00  --------------------------------------------------------  IN: 2210 mL / OUT: 1940 mL / NET: 270 mL    06 Jan 2025 07:01  -  06 Jan 2025 09:08  --------------------------------------------------------  IN: 60 mL / OUT: 310 mL / NET: -250 mL        MEDICATIONS  (STANDING):  amLODIPine   Tablet 10 milliGRAM(s) Oral daily  atorvastatin 40 milliGRAM(s) Oral at bedtime  bromocriptine Tablet 10 milliGRAM(s) Oral every 8 hours  carvedilol 6.25 milliGRAM(s) Oral <User Schedule>  chlorhexidine 0.12% Liquid 15 milliLiter(s) Oral Mucosa every 12 hours  chlorhexidine 2% Cloths 1 Application(s) Topical daily  dextrose 50% Injectable 25 Gram(s) IV Push once  dextrose 50% Injectable 12.5 Gram(s) IV Push once  dextrose 50% Injectable 25 Gram(s) IV Push once  doxazosin 4 milliGRAM(s) Oral at bedtime  enoxaparin Injectable 40 milliGRAM(s) SubCutaneous <User Schedule>  folic acid 1 milliGRAM(s) Oral daily  glucagon  Injectable 1 milliGRAM(s) IntraMuscular once  insulin lispro (ADMELOG) corrective regimen sliding scale   SubCutaneous every 6 hours  labetalol Injectable 10 milliGRAM(s) IV Push every 4 hours  melatonin 3 milliGRAM(s) Oral at bedtime  modafinil 100 milliGRAM(s) Oral daily  multivitamin 1 Tablet(s) Oral daily  nystatin Ointment 1 Application(s) Topical two times a day  pantoprazole   Suspension 40 milliGRAM(s) Oral daily  povidone iodine 10% Nasal Swab 1 Application(s) Both Nostrils once  thiamine 100 milliGRAM(s) Oral daily  valsartan 160 milliGRAM(s) Oral <User Schedule>      RESPIRATORY:  Mode: AC/ CMV (Assist Control/ Continuous Mandatory Ventilation)  RR (machine): 16  TV (machine): 500  FiO2: 40  PEEP: 6  ITime: 1  MAP: 9  PIP: 28        IMAGING:   Recent imaging studies were reviewed.    LAB RESULTS:                          10.6   11.84 )-----------( 192      ( 06 Jan 2025 02:00 )             33.6       PT/INR - ( 06 Jan 2025 02:00 )   PT: 13.8 sec;   INR: 1.22 ratio         PTT - ( 06 Jan 2025 02:00 )  PTT:27.5 sec    01-06    142  |  111[H]  |  32.6[H]  ----------------------------<  155[H]  4.5   |  22.0  |  0.98    Ca    7.9[L]      06 Jan 2025 02:00  Phos  3.4     01-06  Mg     2.5     01-06    TPro  5.6[L]  /  Alb  2.2[L]  /  TBili  <0.2[L]  /  DBili  0.1  /  AST  192[H]  /  ALT  294[H]  /  AlkPhos  82  01-06                -----------------------------------------------------------------------------------------------------------------------------------------------------------------------------------           Chief complaint:   69 yr old male history of htn, hld , on afib eliquis with prior ischemic stroke 2018, frequent utis, kidney stones, turp, cabg x 2, 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  went to [place], ich, vomited gcs 13 intubated.  Transferred to [facility] for possible SOC.  On arrival patient is sedated on versed, propofol and fent.    MRS 0  ICH score 3  NIHSS 25 (21 Dec 2024 16:13)    24hr EVENTS:  1/5 - fever overnight, improved with motrin    ROS: [x]  Unable to assess due to mental status   All other systems negative    -----------------------------------------------------------------------------------------------------------------------------------------------------------------------------------  PHYSICAL EXAM:  General: Calm, intubated  HEENT: MMM  Neuro:  -Mental status- No acute distress, spont EO, grimaces to nox, no FC  -CN- PERRL 3mm, gaze midline, attends to the right, +cough/gag/corneals  LUE posturing, RUE no mvmt; bilat lower ext TF    CV: regular rhythm, mild bradycardia  Pulm: normal WOB, ++oral secretions, +++tan thick oral secretions  Abd: Soft, nontender, nondistended, dignicare  Ext: no noted edema in lower ext  Skin: warm, dry, IAD    RUE midline, Yeh with sedimented orange urine    -----------------------------------------------------------------------------------------------------  ICU Vital Signs Last 24 Hrs  T(C): 37.7 (06 Jan 2025 06:00), Max: 38.4 (06 Jan 2025 02:00)  T(F): 99.9 (06 Jan 2025 06:00), Max: 101.1 (06 Jan 2025 02:00)  HR: 56 (06 Jan 2025 06:00) (56 - 82)  BP: 119/56 (06 Jan 2025 06:00) (106/54 - 178/70)  BP(mean): 75 (06 Jan 2025 06:00) (65 - 105)  ABP: 154/46 (05 Jan 2025 12:38) (143/46 - 173/131)  ABP(mean): 74 (05 Jan 2025 12:38) (74 - 136)  RR: 16 (06 Jan 2025 06:00) (10 - 23)  SpO2: 98% (06 Jan 2025 06:00) (98% - 100%)    O2 Parameters below as of 06 Jan 2025 04:00  Patient On (Oxygen Delivery Method): ventilator    O2 Concentration (%): 40        I&O's Summary    05 Jan 2025 07:01  -  06 Jan 2025 07:00  --------------------------------------------------------  IN: 2210 mL / OUT: 1940 mL / NET: 270 mL    06 Jan 2025 07:01  -  06 Jan 2025 09:08  --------------------------------------------------------  IN: 60 mL / OUT: 310 mL / NET: -250 mL        MEDICATIONS  (STANDING):  amLODIPine   Tablet 10 milliGRAM(s) Oral daily  atorvastatin 40 milliGRAM(s) Oral at bedtime  bromocriptine Tablet 10 milliGRAM(s) Oral every 8 hours  carvedilol 6.25 milliGRAM(s) Oral <User Schedule>  chlorhexidine 0.12% Liquid 15 milliLiter(s) Oral Mucosa every 12 hours  chlorhexidine 2% Cloths 1 Application(s) Topical daily  dextrose 50% Injectable 25 Gram(s) IV Push once  dextrose 50% Injectable 12.5 Gram(s) IV Push once  dextrose 50% Injectable 25 Gram(s) IV Push once  doxazosin 4 milliGRAM(s) Oral at bedtime  enoxaparin Injectable 40 milliGRAM(s) SubCutaneous <User Schedule>  folic acid 1 milliGRAM(s) Oral daily  glucagon  Injectable 1 milliGRAM(s) IntraMuscular once  insulin lispro (ADMELOG) corrective regimen sliding scale   SubCutaneous every 6 hours  labetalol Injectable 10 milliGRAM(s) IV Push every 4 hours  melatonin 3 milliGRAM(s) Oral at bedtime  modafinil 100 milliGRAM(s) Oral daily  multivitamin 1 Tablet(s) Oral daily  nystatin Ointment 1 Application(s) Topical two times a day  pantoprazole   Suspension 40 milliGRAM(s) Oral daily  povidone iodine 10% Nasal Swab 1 Application(s) Both Nostrils once  thiamine 100 milliGRAM(s) Oral daily  valsartan 160 milliGRAM(s) Oral <User Schedule>      RESPIRATORY:  Mode: AC/ CMV (Assist Control/ Continuous Mandatory Ventilation)  RR (machine): 16  TV (machine): 500  FiO2: 40  PEEP: 6    IMAGING:   Recent imaging studies were reviewed.    LAB RESULTS:               10.6   11.84 )-----------( 192      ( 06 Jan 2025 02:00 )             33.6       PT/INR - ( 06 Jan 2025 02:00 )   PT: 13.8 sec;   INR: 1.22 ratio      PTT - ( 06 Jan 2025 02:00 )  PTT:27.5 sec    01-06    142  |  111[H]  |  32.6[H]  ----------------------------<  155[H]  4.5   |  22.0  |  0.98    Ca    7.9[L]      06 Jan 2025 02:00  Phos  3.4     01-06  Mg     2.5     01-06    TPro  5.6[L]  /  Alb  2.2[L]  /  TBili  <0.2[L]  /  DBili  0.1  /  AST  192[H]  /  ALT  294[H]  /  AlkPhos  82  01-06    -----------------------------------------------------------------------------------------------------------------------------------------------------------------------------------

## 2025-01-06 NOTE — PROGRESS NOTE ADULT - ASSESSMENT
69 yr old male h/o afib with acute non-traumatic cerebellar ICH, with vasogenic edema, cerebellar compression; obstructive hydrocephalus.  S/p decompressive SOC 12/21/2024 and 12/27.    Patient is critically ill with high probability of imminent neurologic or life-threatening deterioration due to the following diagnoses:  1) Cerebellar Hemorrhage  2) Obstructive Hydrocephalus  - For which we are monitoring CSF output and adjusting shunt as necessary  3) Respiratory Failure  - For which we are treating with antibiotics and weaning ventilator settings with spontaneous breathing trials as tolerated    Plan:  Neuro  - neurochecks q4h  - hold amantadine with elev LFTs  - start low dose modafinil   - add melatonin qhs  - bromocriptine 10mg  - appreciate Palliative recs  - no sedation needed    Pulm  - reintubated 12/23 (day 11 reintubation)  - SBT as tolerated  - Osats>92%, normocapnia; vent support  - mucomyst prn  - trach/ peg Monday    CV  - inc Valsartan 160, coreg 6.25 bid, norvasc  - off hydralazine  - Liberalize -160   - HR control - aim for <110  - hold full AC in view of fresh ICH- at least 2 weeks    GI   - transaminitis- trend LFTs, minimize hepatotoxic meds  - cont TF, bowel regimen; PPI for ulcer ppx  - NPO at midnight  - loose stools with dignicare      - alvares replaced 12/27 and 1/2, failed TOVs  - monitor e-lytes, I/Os, IVf; maintain Na 140-150, BMP daily  - cont cardura  - FWF 300cc q8h    Endo  - monitor glucose, aim for 120-180    ID  - febrile  - improving leukocytosis  - Completed empiric Zosyn course    Heme  - SCDs,  SQL  - L basilic and cephalic thrombosis- repeat in 7 days            69 yr old male h/o afib with acute non-traumatic cerebellar ICH, with vasogenic edema, cerebellar compression; obstructive hydrocephalus.  S/p decompressive SOC 12/21/2024 and 12/27.    Patient is critically ill with high probability of imminent neurologic or life-threatening deterioration due to the following diagnoses:  1) Cerebellar Hemorrhage  2) Obstructive Hydrocephalus  - For which we are monitoring CSF output and adjusting shunt as necessary  3) Respiratory Failure  - For which we are treating with antibiotics and weaning ventilator settings with spontaneous breathing trials as tolerated    Plan-  Neuro:  - neurochecks q4h  - start low dose modafinil 100mg daily, add melatonin qhs  - bromocriptine 10mg q8h  - appreciate Palliative recs  - no sedation needed  - pain/temp control - can use motrin  Mobility: ROM in bed    Pulm  - trach/PEG today 1/7  - SBT as tolerated  - Osats>92%, normocapnia; vent support  - mucomyst/albuterol prn      CV: 12/23 EF 53%, mod LVH  - Liberalize -160 , labetalol IV PRN  - inc Valsartan 160, coreg 6.25 bid, norvasc  - holding lipitor for transaminitis    GI: # transaminitis  - d/w nutrition re: alternative TFs  - trend LFTs, minimize hepatotoxic meds  - cont TF, bowel regimen; PPI for ulcer ppx  - NPO at midnight, f/u with ACS when to restart TFs  - loose stools with dignicare, banatrol  - folate, thiamine, multivitamins    Renal/: urinary retention  - alvares replaced 12/27 and 1/2, failed TOVs  - monitor e-lytes, I/Os  - cont cardura 4mg    Endo: A1c 6.0  - monitor glucose, aim for 120-180    ID:   - rpt sputum culture  - monitor fever curve and WBC    Heme:  - SCDs,  SQL  - rpt UE doppler 1/9     Dispo: pending clinical course       69 yr old male h/o afib with acute non-traumatic cerebellar ICH, with vasogenic edema, cerebellar compression; obstructive hydrocephalus.  S/p decompressive SOC 12/21/2024 and 12/27.    Patient is critically ill with high probability of imminent neurologic or life-threatening deterioration due to the following diagnoses:  # Cerebellar Hemorrhage - for which patient has undergone decompressive hemicraniectomy, we are monitoring crani site and exam  # Respiratory Failure - For which we are treating with antibiotics and weaning ventilator settings with spontaneous breathing trials as tolerated    Plan-  Neuro:  - neurochecks q4h  - start low dose modafinil 100mg daily, add melatonin qhs  - bromocriptine 10mg q8h  - appreciate Palliative recs  - no sedation needed  - pain/temp control - can use motrin  Mobility: ROM in bed    Pulm  - trach/PEG today 1/7  - SBT as tolerated  - Osats>92%, normocapnia; vent support  - mucomyst/albuterol prn      CV: 12/23 EF 53%, mod LVH  - Liberalize -160 , labetalol IV PRN  - inc Valsartan 160, coreg 6.25 bid, norvasc  - holding lipitor for transaminitis    GI: # transaminitis  - d/w nutrition re: alternative TFs  - trend LFTs, minimize hepatotoxic meds  - cont TF, bowel regimen; PPI for ulcer ppx  - NPO at midnight, f/u with ACS when to restart TFs  - loose stools with dignicare, banatrol  - folate, thiamine, multivitamins    Renal/: urinary retention  - alvares replaced 12/27 and 1/2, failed TOVs  - monitor e-lytes, I/Os  - cont cardura 4mg    Endo: A1c 6.0  - monitor glucose, aim for 120-180    ID:   - rpt sputum culture  - monitor fever curve and WBC    Heme:  - SCDs,  SQL  - rpt UE doppler 1/9     Dispo: pending clinical course

## 2025-01-07 NOTE — PROGRESS NOTE ADULT - CRITICAL CARE ATTENDING COMMENT
I spent the above minutes of critical care time examining patient, reviewing vitals, labs, medications, imaging and discussing with the team goals of care to prevent life-threatening in this patient who is at high risk for deterioration or death due to:  ICH, respiratory failure, dysphagia, urinary retention, fever. I spent the above minutes of critical care time examining patient, reviewing vitals, labs, medications, imaging, goals of care discussions with family, and discussing with the team the prevention of threat to life in this patient who is at high risk for deterioration or death due to:  ICH, respiratory failure, dysphagia, urinary retention, fever.

## 2025-01-07 NOTE — PROGRESS NOTE ADULT - NSPROGADDITIONALINFOA_GEN_ALL_CORE
Time spent with review of chart documents, labs, imaging. Direct patient assessment,  formulation of care plan, documentation. Discussion with  Interdisciplinary  team  NSICU team
Time spent with review of chart documents, labs, imaging. Direct patient assessment,  formulation of care plan, documentation. Discussion with  Interdisciplinary  team  NSICU Dr. Lora, PAs

## 2025-01-07 NOTE — PROGRESS NOTE ADULT - SUBJECTIVE AND OBJECTIVE BOX
CC:  Follow up GOC , Symptoms    OVERNIGHT EVENTS:  reported wife reconsidering trach/peg     Present Symptoms:   Dyspnea:  No    Nausea/Vomiting:  No    Anxiety/ Agitation No    Depression:   Unable to assess  Fatigue:    Unable  to assess  Loss of appetite:   NA   Constipation: Not Reported      Pain:  No Signs            Location            Duration            Character            Severity            Factors            Effect    Pain AD Score:  [email or web address] (press ctrl + left click to view)    Review of Systems: Reviewed as above  Further ROS unableto  obtain due to poor mentation       MEDICATIONS  (STANDING):  amLODIPine   Tablet 10 milliGRAM(s) Oral daily  bromocriptine Tablet 10 milliGRAM(s) Oral every 8 hours  carvedilol 6.25 milliGRAM(s) Oral <User Schedule>  chlorhexidine 0.12% Liquid 15 milliLiter(s) Oral Mucosa every 12 hours  chlorhexidine 2% Cloths 1 Application(s) Topical daily  doxazosin 4 milliGRAM(s) Oral at bedtime  enoxaparin Injectable 40 milliGRAM(s) SubCutaneous <User Schedule>  folic acid 1 milliGRAM(s) Oral daily  hydrALAZINE 25 milliGRAM(s) Oral every 8 hours  melatonin 3 milliGRAM(s) Oral at bedtime  modafinil 100 milliGRAM(s) Oral daily  multivitamin 1 Tablet(s) Oral daily  nystatin Powder 1 Application(s) Topical two times a day  pantoprazole   Suspension 40 milliGRAM(s) Oral daily  povidone iodine 10% Nasal Swab 1 Application(s) Both Nostrils once  thiamine 100 milliGRAM(s) Oral daily  valsartan 160 milliGRAM(s) Oral <User Schedule>    MEDICATIONS  (PRN):  acetylcysteine 10%  Inhalation 4 milliLiter(s) Inhalation every 6 hours PRN Thick secretions  albuterol    0.083% 2.5 milliGRAM(s) Nebulizer every 6 hours PRN Shortness of Breath and/or Wheezing  hydrALAZINE Injectable 10 milliGRAM(s) IV Push every 2 hours PRN SBP>160  ibuprofen  Tablet. 400 milliGRAM(s) Oral every 6 hours PRN Temp greater or equal to 38.3C (100.9F)  labetalol Injectable 10 milliGRAM(s) IV Push every 6 hours PRN Systolic blood pressure >160  ondansetron Injectable 4 milliGRAM(s) IV Push every 6 hours PRN Nausea and/or Vomiting      PHYSICAL EXAM:  Vital Signs Last 24 Hrs  T(C): 38.5 (07 Jan 2025 12:00), Max: 38.5 (07 Jan 2025 12:00)  T(F): 101.3 (07 Jan 2025 12:00), Max: 101.3 (07 Jan 2025 12:00)  HR: 66 (07 Jan 2025 13:00) (54 - 75)  BP: 103/60 (07 Jan 2025 13:00) (103/60 - 177/60)  BP(mean): 73 (07 Jan 2025 13:00) (69 - 100)  RR: 15 (07 Jan 2025 13:00) (13 - 24)  SpO2: 99% (07 Jan 2025 13:00) (97% - 100%)    Parameters below as of 07 Jan 2025 12:00  Patient On (Oxygen Delivery Method): ventilator    O2 Concentration (%): 40        LABS:                          10.8   10.79 )-----------( 211      ( 07 Jan 2025 01:57 )             34.0     01-07    142  |  110[H]  |  27.7[H]  ----------------------------<  105[H]  4.7   |  20.0[L]  |  0.90    Ca    7.9[L]      07 Jan 2025 01:57  Phos  3.5     01-07  Mg     2.4     01-07    TPro  5.9[L]  /  Alb  2.3[L]  /  TBili  0.4  /  DBili  0.1  /  AST  80[H]  /  ALT  202[H]  /  AlkPhos  74  01-07    PT/INR - ( 06 Jan 2025 02:00 )   PT: 13.8 sec;   INR: 1.22 ratio         PTT - ( 06 Jan 2025 02:00 )  PTT:27.5 sec  Urinalysis Basic - ( 07 Jan 2025 01:57 )    Color: x / Appearance: x / SG: x / pH: x  Gluc: 105 mg/dL / Ketone: x  / Bili: x / Urobili: x   Blood: x / Protein: x / Nitrite: x   Leuk Esterase: x / RBC: x / WBC x   Sq Epi: x / Non Sq Epi: x / Bacteria: x      I&O's Summary    06 Jan 2025 07:01  -  07 Jan 2025 07:00  --------------------------------------------------------  IN: 380 mL / OUT: 2395 mL / NET: -2015 mL    07 Jan 2025 07:01  -  07 Jan 2025 16:27  --------------------------------------------------------  IN: 70 mL / OUT: 250 mL / NET: -180 mL        RADIOLOGY & ADDITIONAL STUDIES:  Imaging Reviewed  (   )           ADVANCE DIRECTIVE PREFERENCES    Full Code  DNR/I  and continuing medical treatments  DNR with Trial of Intubation and continuing medical treatments   DNR/I  and comfort measures

## 2025-01-07 NOTE — PROGRESS NOTE ADULT - ASSESSMENT
69 yr old male h/o afib with acute non-traumatic cerebellar ICH, with vasogenic edema, cerebellar compression; obstructive hydrocephalus.  S/p decompressive SOC 12/21/2024 and 12/27.    Patient is critically ill with high probability of imminent neurologic or life-threatening deterioration due to the following diagnoses:  # Cerebellar Hemorrhage - s/p decompressive hemicraniectomy, we are monitoring crani site and exam  # Respiratory Failure - treating with antibiotics and weaning ventilator settings with SBTs    Plan-  Neuro:  - neurochecks q4h  - start low dose modafinil 100mg daily, add melatonin qhs  - bromocriptine 10mg q8h  - no sedation needed  - pain/temp control - can use motrin  Mobility: ROM in bed    Pulm  - trach/PEG today 1/7  - SBT as tolerated  - Osats>92%, normocapnia; vent support  - mucomyst/albuterol prn    CV: 12/23 EF 53%, mod LVH  - Liberalize -160 , labetalol IV PRN  - inc Valsartan 160, coreg 6.25 bid, norvasc, hydral 25 TID  - holding lipitor for transaminitis    GI: # transaminitis  - trend LFTs, minimize hepatotoxic meds  - cont uptitratings TFs; PPI for ulcer ppx  - NPO at midnight, f/u with ACS when to restart TFs  - loose stools with dignicare, banatrol  - folate, thiamine, multivitamins    Renal/: urinary retention  - alvares replaced 12/27 and 1/2, failed TOVs  - monitor e-lytes, I/Os  - cont cardura 4mg    Endo: A1c 6.0  - glucose goal for 120-180    ID:   - rpt sputum culture  - monitor fever curve and WBC    Heme:  - SCDs,  SQL  - rpt UE doppler 1/9     Dispo: pending clinical course  - palliative care following

## 2025-01-07 NOTE — PROGRESS NOTE ADULT - SUBJECTIVE AND OBJECTIVE BOX
Chief complaint:   69 yr old male history of htn, hld , on afib eliquis with prior ischemic stroke 2018, frequent utis, kidney stones, turp, cabg x 2, 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  went to [place], ich, vomited gcs 13 intubated.  Transferred to [facility] for possible SOC.  On arrival patient is sedated on versed, propofol and fent.    MRS 0  ICH score 3  NIHSS 25 (21 Dec 2024 16:13)    24hr EVENTS:  1/5 - fever overnight, improved with motrin  1/6 - restarted hydral 25 TID, Tmax 38.4    ROS: [x]  Unable to assess due to mental status   All other systems negative    -----------------------------------------------------------------------------------------------------------------------------------------------------------------------------------  PHYSICAL EXAM:  General: Calm, intubated  HEENT: MMM  Neuro:  -Mental status- No acute distress, spont EO, grimaces to nox, no FC  -CN- PERRL 3mm, gaze midline, attends to the right, +cough/gag/corneals  LUE posturing, RUE no mvmt; LLE trace mvmt, RLE no mvmt    CV: regular rhythm, mild bradycardia  Pulm: normal WOB, minimal oral secretions, no in-line secretions  Abd: Soft, nontender, nondistended, L nare NGT, dignicare  : Yeh with sedimented yellow urine  Ext: no noted edema in lower ext  Skin: warm, dry, IAD    RUE midline    -----------------------------------------------------------------------------------------------------  ICU Vital Signs Last 24 Hrs  T(C): 38.3 (07 Jan 2025 11:00), Max: 38.5 (06 Jan 2025 16:00)  T(F): 100.9 (07 Jan 2025 11:00), Max: 101.3 (06 Jan 2025 16:00)  HR: 64 (07 Jan 2025 11:00) (54 - 75)  BP: 139/59 (07 Jan 2025 11:00) (124/50 - 177/60)  BP(mean): 82 (07 Jan 2025 11:00) (69 - 100)  RR: 16 (07 Jan 2025 11:00) (13 - 25)  SpO2: 98% (07 Jan 2025 11:00) (98% - 100%)    O2 Parameters below as of 07 Jan 2025 08:00  Patient On (Oxygen Delivery Method): ventilator    O2 Concentration (%): 40        I&O's Summary    06 Jan 2025 07:01  -  07 Jan 2025 07:00  --------------------------------------------------------  IN: 380 mL / OUT: 2395 mL / NET: -2015 mL        MEDICATIONS  (STANDING):  amLODIPine   Tablet 10 milliGRAM(s) Oral daily  bromocriptine Tablet 10 milliGRAM(s) Oral every 8 hours  carvedilol 6.25 milliGRAM(s) Oral <User Schedule>  chlorhexidine 0.12% Liquid 15 milliLiter(s) Oral Mucosa every 12 hours  chlorhexidine 2% Cloths 1 Application(s) Topical daily  doxazosin 4 milliGRAM(s) Oral at bedtime  enoxaparin Injectable 40 milliGRAM(s) SubCutaneous <User Schedule>  folic acid 1 milliGRAM(s) Oral daily  hydrALAZINE 25 milliGRAM(s) Oral every 8 hours  insulin lispro (ADMELOG) corrective regimen sliding scale   SubCutaneous every 6 hours  melatonin 3 milliGRAM(s) Oral at bedtime  modafinil 100 milliGRAM(s) Oral daily  multivitamin 1 Tablet(s) Oral daily  nystatin Powder 1 Application(s) Topical two times a day  pantoprazole   Suspension 40 milliGRAM(s) Oral daily  povidone iodine 10% Nasal Swab 1 Application(s) Both Nostrils once  thiamine 100 milliGRAM(s) Oral daily  valsartan 160 milliGRAM(s) Oral <User Schedule>      RESPIRATORY:  Mode: AC/ CMV (Assist Control/ Continuous Mandatory Ventilation)  RR (machine): 16  TV (machine): 500  FiO2: 40  PEEP: 6    IMAGING:   Recent imaging studies were reviewed.    LAB RESULTS:               10.8   10.79 )-----------( 211      ( 07 Jan 2025 01:57 )             34.0       PT/INR - ( 06 Jan 2025 02:00 )   PT: 13.8 sec;   INR: 1.22 ratio      PTT - ( 06 Jan 2025 02:00 )  PTT:27.5 sec    01-07    142  |  110[H]  |  27.7[H]  ----------------------------<  105[H]  4.7   |  20.0[L]  |  0.90    Ca    7.9[L]      07 Jan 2025 01:57  Phos  3.5     01-07  Mg     2.4     01-07    TPro  5.9[L]  /  Alb  2.3[L]  /  TBili  0.4  /  DBili  0.1  /  AST  80[H]  /  ALT  202[H]  /  AlkPhos  74  01-07    -----------------------------------------------------------------------------------------------------------------------------------------------------------------------------------

## 2025-01-07 NOTE — PROGRESS NOTE ADULT - ASSESSMENT
69 year old male PMHx afib (on eliquis with prior ischemic stroke 2018), HTN, CABG x 2, ETOH use transferred from [place] for ICH. Patient admitted to NEURO ICU for monitoring, Palliative consulted for support as patient condition not improving, no gag/cough reflex and poor neuro exam.      Acute Respiratory Failure  vent dependent  trach/peg ? wife reconsidering    ICH  NEURO ICU management  Noted CT Head 12.27.24/12.28.24    Fevers  likely central  Tylenol PRN    Debility  Assist in ADLS  Maintain safety, fall, aspiration precautions  turn reposition    Encounter for Palliative Care  Wife reported to have reviewed husbands Living Will and now  reconsidering trach/peg. She will speak to her children  Will follow up with family

## 2025-01-08 NOTE — PROGRESS NOTE ADULT - CRITICAL CARE ATTENDING COMMENT
I spent the above minutes of critical care time examining patient, reviewing vitals, labs, medications, imaging, goals of care discussions with family, and discussing with the team the prevention of threat to life in this patient who is at high risk for deterioration or death due to:  ICH, respiratory failure, dysphagia, urinary retention, fever. I spent the above minutes of critical care time examining patient, reviewing vitals, labs, medications, imaging, goals of care discussions with family, and discussing with the team the prevention of threat to life in this patient who is at high risk for deterioration or death due to:  ICH, respiratory failure, encephalopathy, dysphagia, urinary retention, fever.

## 2025-01-08 NOTE — PROGRESS NOTE ADULT - SUBJECTIVE AND OBJECTIVE BOX
Preliminary note, offical recommendations pending attending review/signature   [facility] Stroke Team  Progress Note     HPI:  The pt is a 69y M PMHx ischemic stroke (2018), atrial fibrillation on Eliquis, ETOH abuse, HTN, HLD, frequent UTI, hx kidney stones, s/p TURP, s/p CABG x2, and babesiasis sepsis in 06/2024 who [place] [facility] [place] complaining of dizziness while driving with associated nausea and confusion. [place] [facility] [place] GCS was noted to be 13. (+) vomiting. CT imaging revealed large cerebellar hemorrhage w/ severe mass effect and mild dissection into the fourth ventricle; associated hydrocephalus, 3.6mm left-to-right midline shift, b/l occipital horn intraventricular hemorrhage and mild SAH in the Left cerebellum. Also notable for 2.2mm left frontal convexity subdural hygroma. Patient was subsequently intubated and transferred to [facility] for possible suboccipital craniectomy. Upon arrival patient was sedated on Versed, Propofol and Fentanyl; admitted to NSICU. Now s/p decompressive SOC 12/21/2024. Taken back to OR for second decompressive SOC 12/27/24. Remains intubated in ICU.     SUBJECTIVE: No events overnight.  Unable to obtain ROS secondary to intubated status.     acetylcysteine 10%  Inhalation 4 milliLiter(s) Inhalation every 6 hours PRN  albuterol    0.083% 2.5 milliGRAM(s) Nebulizer every 6 hours PRN  amLODIPine   Tablet 10 milliGRAM(s) Oral daily  bromocriptine Tablet 10 milliGRAM(s) Oral every 8 hours  carvedilol 6.25 milliGRAM(s) Oral <User Schedule>  chlorhexidine 0.12% Liquid 15 milliLiter(s) Oral Mucosa every 12 hours  chlorhexidine 2% Cloths 1 Application(s) Topical daily  doxazosin 4 milliGRAM(s) Oral at bedtime  enoxaparin Injectable 40 milliGRAM(s) SubCutaneous <User Schedule>  fentaNYL    Injectable 25 MICROGram(s) IV Push every 2 hours PRN  folic acid 1 milliGRAM(s) Oral daily  hydrALAZINE 25 milliGRAM(s) Oral every 8 hours  hydrALAZINE Injectable 10 milliGRAM(s) IV Push every 2 hours PRN  labetalol Injectable 10 milliGRAM(s) IV Push every 2 hours PRN  melatonin 3 milliGRAM(s) Oral at bedtime  modafinil 100 milliGRAM(s) Oral daily  multivitamin 1 Tablet(s) Oral daily  nystatin Powder 1 Application(s) Topical two times a day  ondansetron Injectable 4 milliGRAM(s) IV Push every 6 hours PRN  pantoprazole   Suspension 40 milliGRAM(s) Oral daily  povidone iodine 10% Nasal Swab 1 Application(s) Both Nostrils once  thiamine 100 milliGRAM(s) Oral daily  valsartan 160 milliGRAM(s) Oral <User Schedule>      PHYSICAL EXAM:   Vital Signs Last 24 Hrs  T(C): 37.4 (08 Jan 2025 09:00), Max: 38.5 (07 Jan 2025 12:00)  T(F): 99.3 (08 Jan 2025 09:00), Max: 101.3 (07 Jan 2025 12:00)  HR: 71 (08 Jan 2025 09:00) (60 - 85)  BP: 141/69 (08 Jan 2025 09:00) (103/60 - 175/73)  BP(mean): 88 (08 Jan 2025 09:00) (69 - 101)  RR: 14 (08 Jan 2025 09:00) (14 - 22)  SpO2: 100% (08 Jan 2025 09:00) (97% - 100%)    Parameters below as of 08 Jan 2025 08:00  Patient On (Oxygen Delivery Method): ventilator        General: No acute distress. Frequent hiccups noted     NEUROLOGICAL EXAM: Limited secondary to intubated status   Mental status: Intubated, on sedation; does not follow commands or attend to examiner   Cranial Nerves: Pupils 2mm bilaterally and reactive to light; exotropically deviated L eye, +dolls eye reflex. Unable to appreciate facial asymmetry secondary to presence of ET Tube   Motor exam: Withdraws RLE against gravity; LUE/LLE 0/5  Sensation: No response to painful stimuli in the left upper/lower extremities   Coordination/ Gait: No dysmetria, gait not tested    LABS:                        11.5   10.93 )-----------( 228      ( 08 Jan 2025 03:53 )             36.4    01-08    140  |  110[H]  |  29.4[H]  ----------------------------<  122[H]  4.3   |  20.0[L]  |  1.01    Ca    8.0[L]      08 Jan 2025 03:53  Phos  3.5     01-08  Mg     2.5     01-08    TPro  6.2[L]  /  Alb  2.4[L]  /  TBili  0.3[L]  /  DBili  0.1  /  AST  81[H]  /  ALT  183[H]  /  AlkPhos  80  01-08  PT/INR - ( 08 Jan 2025 03:53 )   PT: 13.7 sec;   INR: 1.21 ratio         PTT - ( 08 Jan 2025 03:53 )  PTT:28.7 sec    LDL: 88  A1C: 6.0    IMAGING:   NEURO IMAGING:  CT Head No Cont (01.03.25 @ 16:55)   IMPRESSION:  Grossly stable posterior fossa postsurgical changes.    Grossly stable thin bilateral frontal convexity subdural collections. No   new or worsening hemorrhage identified.    Mildly increased size of scalp collection extending superiorly to the   vertex.     CT Head No Cont (01.01.25 @ 12:37)   Postoperative changes compatible with bilateral suboccipital craniectomy   is again identified. Abnormal low-attenuationinvolving the postop region   is again seen with some surrounding edema. This finding appears   unchanged. 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 This finding measures approximately 3.2 cm   widest diameter and previously measured approximately 3.2 cm in widest   diameter.    The size and configuration of ventricles appear unchanged.   Intraventricular hemorrhage is again seen.      Mixed attenuated subdural hematomas are again identified in thebifrontal   region. These findings appear stable.    Evaluation of the osseous structures aside from postop changes appear   normal    Left-sided maxillary sinus mucosal thickening is seen Air-fluid level   mucosal thickening seen involving left sphenoid sinus. Air-fluid level is   seen involving right sphenoid sinus Bilateral ethmoid sinus mucosal   thickening is seen    Both mastoid regions demonstrate partial opacification. Skin staples seen   in the postop region.    CT Head No Cont (01.01.25 @ 05:01)   IMPRESSION:  Evolving postoperative change related to suboccipital craniectomy as   described above.    Similar appearance of hyperdensity scattered within the incompletely   visualized extra-axial fluid collection at the surgical site.    Redemonstrated small volume intraventricular hemorrhage. No definite   evidence of interval new acute intracranial hemorrhage. Primarily   hypoattenuating bilateral frontoparietal extra-axial collections are   grossly stable in size.    Areas of hypoattenuation along the posterior cerebral hemispheres   compatible with devitalized tissue/infarct seen on recent MRI.    Ventricles may be minimally increased in prominence whencompared with   most recent CT of 12/28/2024. Recommend attention on follow-up.    MR Head No Cont (12.31.24 @ 09:56)   IMPRESSION: Postop changes are identified. Areas of hemorrhage in the   postop material is identified. Acute infarcts involving both cerebellar   hemispheres are seen.    Bilateral subdural hematomas are identified.    CT Head No Cont (12.28.24 @ 03:14)   IMPRESSION: Postop changes again identified as described above.     CT Head No Cont (12.27.24 @ 17:20)   IMPRESSION:  Status post evacuation of previously seen hemorrhage in the right   cerebellar hemisphere.  Small amount of residual hemorrhage in the   midline vermian region is stable.  Persistent mild edema and mass effect   and cerebellum.    Foci of postoperative pneumocephalus as discussed above.  A postoperative   extradural collection offluid and gas in the craniectomy defect measures   approximately 3 x 6 cm.    Stable intraventricular hemorrhage.  Slight prominence of the ventricles   is stable.    Grossly stable low-attenuation frontal subdural collections bilaterally.    CT Head No Cont (12.27.24 @ 08:57)   FINDINGS:  Status post midline suboccipital craniectomy and evacuation of right   cerebellar hematoma. Residual hemorrhage in the right cerebellum 2.3 x   2.7 x 2.4 cm. Moderate adjacent vasogenic edema throughout the bilateral   cerebellar hemispheres. Additional small hemorrhages in the vermis in   left cerebellum Moderate mass effect on fourth ventricle.   Intraventricular hemorrhage in bilateral occipital horns, unchanged.   Scattered bilateral supratentorial subarachnoid hemorrhages, unchanged.   Small to moderate bilateral frontal convexity subdural collections.    There is periventricular and subcortical white matter hypodensity without   mass effect, nonspecific, likely representing mild chronic microvascular   ischemic changes.    Left-sided approach NG tube. Mild inflammatory mucosal changes are seen   throughout the various portions of the paranasal sinuses. Small bilateral   mastoid air cell effusions. Unremarkable orbits. Consider MRI as   clinically warranted.    IMPRESSION: Multicompartment intracranial hemorrhages, as above.    MR Head w/wo IV Cont (12.25.24 @ 12:26)   IMPRESSION:    Status post suboccipital craniectomy for partial evacuation of right   cerebellar hematoma with associated postsurgical changes as detailed   above. Small residual hematoma the right cerebellar hemisphere. No   evidence of enhancing lesion although evaluation of the right cerebellar   hematoma site is partially degraded by intrinsic T1 hyperintense signal   from early subacute hemorrhage.    CT Head No Cont (12.24.24 @ 06:01)   IMPRESSION: Stable follow-up CT exam.    No new areas of acute intracranial hemorrhage are noted.     CT Head No Cont (12.23.24 @ 09:19)   IMPRESSION: Postop changes again identified with abnormal low-attenuation   involving the posterior fossa region associated hemorrhage. 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    There are bilateral subdural collections again identified.    Dilated lateral ventricles are seen with intraventricular air and   hemorrhage.    CT Head No Cont (12.22.24 @ 06:42)   IMPRESSION: No change since 12/21/2024 at 10:47 PM. Left suboccipital   craniectomy with postoperative changes and residual hemorrhage in the   cerebellar vermis. Intraventricular air and hemorrhage. Mild ventricular   dilatation.    CT Head No Cont (12.21.24 @ 22:48)   IMPRESSION:    1.  Decompression of the hematoma in the cerebellum, without interval   rebleeding.     CT Head No Cont (12.21.24 @ 18:20)   IMPRESSION: Midline posterior fossa cerebellar hemorrhage is   redemonstrated measuring 4.2 x 3.4 x 4.0 cm, mildly larger in size.   Severe mass effect on the fourth ventricle. Mild dissection of hemorrhage   into the fourth ventricle. Mild hydrocephalus. In the intraventricular   hemorrhage in the bilateral occipital horns. Small anterior left frontal   convexity subdural hygroma measuring 2.2 mm in thickness. Left-to-right   midline shift is 3.6 mm. Mild subarachnoid hemorrhage in the left   cerebellum..  --  Cardiology Studies:   TTE W or WO Ultrasound Enhancing Agent (12.22.24 @ 09:19)   CONCLUSIONS:      1. Left ventricularcavity is mildly dilated. Left ventricular systolic function is normal with an ejection fraction of 53 % by Ac's method of disks with an ejection fraction visually estimated at 50 to 55 %.   2. Moderate left ventricular hypertrophy.   3. There is mild (grade 1) left ventricular diastolic dysfunction, with elevated left ventricular filling pressure.   4. Normal right ventricular cavity size and probably normal right ventricular systolic function.   5. Mild to moderate aortic stenosis.   6. Trace aortic regurgitation.   7. Aortic root at the sinuses of Valsalva is dilated, measuring 4.20 cm (indexed 1.92 cm/m²). Ascending aorta is dilated, measuring 4.00 cm (indexed 1.83 cm/m²).             [facility] Stroke Team  Progress Note     HPI:  The pt is a 69y M PMHx ischemic stroke (2018), atrial fibrillation on Eliquis, ETOH abuse, HTN, HLD, frequent UTI, hx kidney stones, s/p TURP, s/p CABG x2, and babesiasis sepsis in 06/2024 who initially presented to [facility] [place] on [place]24 complaining of dizziness while driving with associated nausea and confusion. While at [facility] [place], GCS was noted to be 13. (+) vomiting. CT imaging revealed large cerebellar hemorrhage w/ severe mass effect and mild dissection into the fourth ventricle; associated hydrocephalus, 3.6mm left-to-right midline shift, b/l occipital horn intraventricular hemorrhage and mild SAH in the Left cerebellum. Also notable for 2.2mm left frontal convexity subdural hygroma. Patient was subsequently intubated and transferred to [facility] for possible suboccipital craniectomy. Upon arrival patient was sedated on Versed, Propofol and Fentanyl; admitted to NSICU. Now s/p decompressive SOC 12/21/2024. Taken back to OR for second decompressive SOC 12/27/24. Remains intubated in ICU.     SUBJECTIVE: No events overnight.  Unable to obtain ROS secondary to intubated status.     acetylcysteine 10%  Inhalation 4 milliLiter(s) Inhalation every 6 hours PRN  albuterol    0.083% 2.5 milliGRAM(s) Nebulizer every 6 hours PRN  amLODIPine   Tablet 10 milliGRAM(s) Oral daily  bromocriptine Tablet 10 milliGRAM(s) Oral every 8 hours  carvedilol 6.25 milliGRAM(s) Oral <User Schedule>  chlorhexidine 0.12% Liquid 15 milliLiter(s) Oral Mucosa every 12 hours  chlorhexidine 2% Cloths 1 Application(s) Topical daily  doxazosin 4 milliGRAM(s) Oral at bedtime  enoxaparin Injectable 40 milliGRAM(s) SubCutaneous <User Schedule>  fentaNYL    Injectable 25 MICROGram(s) IV Push every 2 hours PRN  folic acid 1 milliGRAM(s) Oral daily  hydrALAZINE 25 milliGRAM(s) Oral every 8 hours  hydrALAZINE Injectable 10 milliGRAM(s) IV Push every 2 hours PRN  labetalol Injectable 10 milliGRAM(s) IV Push every 2 hours PRN  melatonin 3 milliGRAM(s) Oral at bedtime  modafinil 100 milliGRAM(s) Oral daily  multivitamin 1 Tablet(s) Oral daily  nystatin Powder 1 Application(s) Topical two times a day  ondansetron Injectable 4 milliGRAM(s) IV Push every 6 hours PRN  pantoprazole   Suspension 40 milliGRAM(s) Oral daily  povidone iodine 10% Nasal Swab 1 Application(s) Both Nostrils once  thiamine 100 milliGRAM(s) Oral daily  valsartan 160 milliGRAM(s) Oral <User Schedule>      PHYSICAL EXAM:   Vital Signs Last 24 Hrs  T(C): 37.4 (08 Jan 2025 09:00), Max: 38.5 (07 Jan 2025 12:00)  T(F): 99.3 (08 Jan 2025 09:00), Max: 101.3 (07 Jan 2025 12:00)  HR: 71 (08 Jan 2025 09:00) (60 - 85)  BP: 141/69 (08 Jan 2025 09:00) (103/60 - 175/73)  BP(mean): 88 (08 Jan 2025 09:00) (69 - 101)  RR: 14 (08 Jan 2025 09:00) (14 - 22)  SpO2: 100% (08 Jan 2025 09:00) (97% - 100%)    Parameters below as of 08 Jan 2025 08:00  Patient On (Oxygen Delivery Method): ventilator        General: No acute distress. Frequent hiccups noted     NEUROLOGICAL EXAM: Limited secondary to intubated status   Mental status: Intubated, on sedation; does not follow commands or attend to examiner   Cranial Nerves: Pupils 2mm bilaterally and reactive to light; exotropically deviated L eye, +dolls eye reflex. Unable to appreciate facial asymmetry secondary to presence of ET Tube   Motor exam: Withdraws RLE against gravity; LUE/LLE 0/5  Sensation: No response to painful stimuli in the left upper/lower extremities   Coordination/ Gait: No dysmetria, gait not tested    LABS:                        11.5   10.93 )-----------( 228      ( 08 Jan 2025 03:53 )             36.4    01-08    140  |  110[H]  |  29.4[H]  ----------------------------<  122[H]  4.3   |  20.0[L]  |  1.01    Ca    8.0[L]      08 Jan 2025 03:53  Phos  3.5     01-08  Mg     2.5     01-08    TPro  6.2[L]  /  Alb  2.4[L]  /  TBili  0.3[L]  /  DBili  0.1  /  AST  81[H]  /  ALT  183[H]  /  AlkPhos  80  01-08  PT/INR - ( 08 Jan 2025 03:53 )   PT: 13.7 sec;   INR: 1.21 ratio         PTT - ( 08 Jan 2025 03:53 )  PTT:28.7 sec    LDL: 88  A1C: 6.0    IMAGING:   NEURO IMAGING:  CT Head No Cont (01.03.25 @ 16:55)   IMPRESSION:  Grossly stable posterior fossa postsurgical changes.    Grossly stable thin bilateral frontal convexity subdural collections. No   new or worsening hemorrhage identified.    Mildly increased size of scalp collection extending superiorly to the   vertex.     CT Head No Cont (01.01.25 @ 12:37)   Postoperative changes compatible with bilateral suboccipital craniectomy   is again identified. Abnormal low-attenuationinvolving the postop region   is again seen with some surrounding edema. This finding appears   unchanged. 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 This finding measures approximately 3.2 cm   widest diameter and previously measured approximately 3.2 cm in widest   diameter.    The size and configuration of ventricles appear unchanged.   Intraventricular hemorrhage is again seen.      Mixed attenuated subdural hematomas are again identified in thebifrontal   region. These findings appear stable.    Evaluation of the osseous structures aside from postop changes appear   normal    Left-sided maxillary sinus mucosal thickening is seen Air-fluid level   mucosal thickening seen involving left sphenoid sinus. Air-fluid level is   seen involving right sphenoid sinus Bilateral ethmoid sinus mucosal   thickening is seen    Both mastoid regions demonstrate partial opacification. Skin staples seen   in the postop region.    CT Head No Cont (01.01.25 @ 05:01)   IMPRESSION:  Evolving postoperative change related to suboccipital craniectomy as   described above.    Similar appearance of hyperdensity scattered within the incompletely   visualized extra-axial fluid collection at the surgical site.    Redemonstrated small volume intraventricular hemorrhage. No definite   evidence of interval new acute intracranial hemorrhage. Primarily   hypoattenuating bilateral frontoparietal extra-axial collections are   grossly stable in size.    Areas of hypoattenuation along the posterior cerebral hemispheres   compatible with devitalized tissue/infarct seen on recent MRI.    Ventricles may be minimally increased in prominence whencompared with   most recent CT of 12/28/2024. Recommend attention on follow-up.    MR Head No Cont (12.31.24 @ 09:56)   IMPRESSION: Postop changes are identified. Areas of hemorrhage in the   postop material is identified. Acute infarcts involving both cerebellar   hemispheres are seen.    Bilateral subdural hematomas are identified.    CT Head No Cont (12.28.24 @ 03:14)   IMPRESSION: Postop changes again identified as described above.     CT Head No Cont (12.27.24 @ 17:20)   IMPRESSION:  Status post evacuation of previously seen hemorrhage in the right   cerebellar hemisphere.  Small amount of residual hemorrhage in the   midline vermian region is stable.  Persistent mild edema and mass effect   and cerebellum.    Foci of postoperative pneumocephalus as discussed above.  A postoperative   extradural collection offluid and gas in the craniectomy defect measures   approximately 3 x 6 cm.    Stable intraventricular hemorrhage.  Slight prominence of the ventricles   is stable.    Grossly stable low-attenuation frontal subdural collections bilaterally.    CT Head No Cont (12.27.24 @ 08:57)   FINDINGS:  Status post midline suboccipital craniectomy and evacuation of right   cerebellar hematoma. Residual hemorrhage in the right cerebellum 2.3 x   2.7 x 2.4 cm. Moderate adjacent vasogenic edema throughout the bilateral   cerebellar hemispheres. Additional small hemorrhages in the vermis in   left cerebellum Moderate mass effect on fourth ventricle.   Intraventricular hemorrhage in bilateral occipital horns, unchanged.   Scattered bilateral supratentorial subarachnoid hemorrhages, unchanged.   Small to moderate bilateral frontal convexity subdural collections.    There is periventricular and subcortical white matter hypodensity without   mass effect, nonspecific, likely representing mild chronic microvascular   ischemic changes.    Left-sided approach NG tube. Mild inflammatory mucosal changes are seen   throughout the various portions of the paranasal sinuses. Small bilateral   mastoid air cell effusions. Unremarkable orbits. Consider MRI as   clinically warranted.    IMPRESSION: Multicompartment intracranial hemorrhages, as above.    MR Head w/wo IV Cont (12.25.24 @ 12:26)   IMPRESSION:    Status post suboccipital craniectomy for partial evacuation of right   cerebellar hematoma with associated postsurgical changes as detailed   above. Small residual hematoma the right cerebellar hemisphere. No   evidence of enhancing lesion although evaluation of the right cerebellar   hematoma site is partially degraded by intrinsic T1 hyperintense signal   from early subacute hemorrhage.    CT Head No Cont (12.24.24 @ 06:01)   IMPRESSION: Stable follow-up CT exam.    No new areas of acute intracranial hemorrhage are noted.     CT Head No Cont (12.23.24 @ 09:19)   IMPRESSION: Postop changes again identified with abnormal low-attenuation   involving the posterior fossa region associated hemorrhage. 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    There are bilateral subdural collections again identified.    Dilated lateral ventricles are seen with intraventricular air and   hemorrhage.    CT Head No Cont (12.22.24 @ 06:42)   IMPRESSION: No change since 12/21/2024 at 10:47 PM. Left suboccipital   craniectomy with postoperative changes and residual hemorrhage in the   cerebellar vermis. Intraventricular air and hemorrhage. Mild ventricular   dilatation.    CT Head No Cont (12.21.24 @ 22:48)   IMPRESSION:    1.  Decompression of the hematoma in the cerebellum, without interval   rebleeding.     CT Head No Cont (12.21.24 @ 18:20)   IMPRESSION: Midline posterior fossa cerebellar hemorrhage is   redemonstrated measuring 4.2 x 3.4 x 4.0 cm, mildly larger in size.   Severe mass effect on the fourth ventricle. Mild dissection of hemorrhage   into the fourth ventricle. Mild hydrocephalus. In the intraventricular   hemorrhage in the bilateral occipital horns. Small anterior left frontal   convexity subdural hygroma measuring 2.2 mm in thickness. Left-to-right   midline shift is 3.6 mm. Mild subarachnoid hemorrhage in the left   cerebellum..  --  Cardiology Studies:   TTE W or WO Ultrasound Enhancing Agent (12.22.24 @ 09:19)   CONCLUSIONS:      1. Left ventricularcavity is mildly dilated. Left ventricular systolic function is normal with an ejection fraction of 53 % by Ac's method of disks with an ejection fraction visually estimated at 50 to 55 %.   2. Moderate left ventricular hypertrophy.   3. There is mild (grade 1) left ventricular diastolic dysfunction, with elevated left ventricular filling pressure.   4. Normal right ventricular cavity size and probably normal right ventricular systolic function.   5. Mild to moderate aortic stenosis.   6. Trace aortic regurgitation.   7. Aortic root at the sinuses of Valsalva is dilated, measuring 4.20 cm (indexed 1.92 cm/m²). Ascending aorta is dilated, measuring 4.00 cm (indexed 1.83 cm/m²).

## 2025-01-08 NOTE — PROGRESS NOTE ADULT - ASSESSMENT
69 year old male PMHx afib (on eliquis with prior ischemic stroke 2018), HTN, CABG x 2, ETOH use transferred from [place] for ICH. Patient admitted to NEURO ICU for monitoring, Palliative consulted for support as patient condition not improving, no gag/cough reflex and poor neuro exam.      Acute Respiratory Failure  vent dependent  trach/peg ? wife reconsidering    ICH  NEURO ICU management  Noted CT Head 12.27.24/12.28.24    Fevers  likely central  Tylenol PRN    Debility  Assist in ADLS  Maintain safety, fall, aspiration precautions  turn reposition    Encounter for Palliative Care  Wife reported to have reviewed husbands Living Will and now  reconsidering trach/peg. She will speak to her children  HCP and Living Will Reviewed   IN PROGRESS     69 year old male PMHx afib (on eliquis with prior ischemic stroke 2018), HTN, CABG x 2, ETOH use transferred from [place] for ICH. Patient admitted to NEURO ICU for monitoring, Palliative consulted for support as patient condition not improving, no gag/cough reflex and poor neuro exam.      Acute Respiratory Failure  vent dependent  trach/peg ? wife reconsidering    ICH  NEURO ICU management  Noted CT Head 12.27.24/12.28.24    Fevers  likely central  Tylenol PRN    Debility  Assist in ADLS  Maintain safety, fall, aspiration precautions  turn reposition    Encounter for Palliative Care  Wife reported to have reviewed husbands Living Will and now  reconsidering trach/peg. She will speak to her children  HCP and Living Will Reviewed - patient would not want LST    See [facility] note above for details.  In summary  1.  Wife wishes to withdraw vent - Thursday or Friday.  Just waiting for her children to be present.   2.  Agreed in the interim to DNR - MOLST completed.

## 2025-01-08 NOTE — PROGRESS NOTE ADULT - CONVERSATION DETAILS
Wife states she had previously discussed with the NSICU team and has made the decision for trach/peg.  She wants to give  more time to see if he can recover.  Reminded wife as per her previous discussions with NSICU, it can take months to see IF  there will be a recovery.  Unclear to what extent he can recover, and possibly may not be able to wean off the vent.     Wife inquired what happens  if after some time he does not recover to a QOL that she knows would be acceptable to him. Informed wife LST measures can be d/c depending on various scenarios.    Wife explained once trach/peg,  will need vent facility .
Wife states she had time to discuss with their children and knows  would not want to be in this states.  She had reviewed his living will.    Answered questions regarding vent withdrawal and symptom management to relieve pain suffering.  Prognosis at that point,   varies, could be minutes, hours sometimes days.    She will let us know when her family are ready - maybe tomorrow or Friday    We  discussed advanced directives - CPR and of its limited benefit in consideration of her 's current  condition.  Wife states she would not want him to undergo such intervention, agree to DNR.   NADIYA lemos

## 2025-01-08 NOTE — PROGRESS NOTE ADULT - ASSESSMENT
ASSESSMENT:   The pt is a 69y M PMHx ischemic stroke (2018), atrial fibrillation on Eliquis, ETOH abuse, HTN, HLD, frequent UTI, hx kidney stones, s/p TURP, s/p CABG x2, and babesiasis sepsis in 06/2024 who initially [place] to [facility] [place]21[place]24 complaining of dizziness while driving with associated nausea and confusion. While at [facility], GCS was noted to be 13. (+) vomiting. CT imaging revealed large cerebellar hemorrhage w/ severe mass effect and mild dissection into the fourth ventricle; associated hydrocephalus, 3.6mm left-to-right midline shift, b/l occipital horn intraventricular hemorrhage, 2.2mm left frontal convexity subdural hygroma and mild SAH in the Left cerebellum. Transferred to [facility] for suboccipital craniectomy.     Impression: Acute non-traumatic cerebellar ICH with vasogenic edema, cerebellar compression and obstructive hydrocephalus, s/p Left decompressive SOC 12/21/24. Taken back to OR for another decompressive SOC 12/27/24. MRI w/ and w/o completed showing no evidence of enhancing lesion. Etiology likely hypertensive with superimposed ETOH abuse vs underlying coagulopathy.     NEURO:   -Neurologically w/ limited exam secondary to intubation, sedation    -Continue close monitoring for neurologic deterioration    -Coma neuro checks q4hrs per NSICU recommendations   -MRI completed 12/25/24 with findings of associated postsurgical changes and small residual hematoma in the right cerebellar hemisphere.   -EEG  12/27/24 negative for seizure activity   -SBP goal <160, avoiding rapid fluctuations and hypotension.   -ANTITHROMBOTIC THERAPY: currently on hold in setting of ICH.   -Hx AF: Eliquis on hold in setting of ICH, reinitiation timeline per NS however from a stroke neuro standpoint would hold for at least 1 month from stroke onset (1/18/24), patient will need repeat imaging prior to re-initiation of anticoagulation.   -statin not indicated in the setting of ICH  -Dysphagia screen: fail, NGT in place  -Physical therapy/OT/Speech eval/treatment.   -TTE as noted   -Cardiac monitoring w/ telemetry           -DVT ppx: per NSICU   -Maintain adequate hydration    -Na Goal: 135-145   -Monitor for si/sx of infection   -LDL/A1C as above  -Stroke education   -Plan for trach/PEG today per primary team     OTHER: Condition and plan of care d/w primary team; questions and concerns addressed.       CORE MEASURES     Admission NIHSS: 27     Tenecteplase : [] YES [x] NO     LDL/A1C: 88/6.0     Depression Screen- if depression hx and/or present     Statin Therapy: n/a      Dysphagia Screen: [] PASS [x] FAIL     Smoking in the past 12 months [] YES [x] NO     Afib [x] YES [] NO     Stroke Education [x] YES 12/27/24 [] NO     Diabetes [] YES [x] NO

## 2025-01-08 NOTE — PROGRESS NOTE ADULT - SUBJECTIVE AND OBJECTIVE BOX
CC:  Follow up GOC , Symptoms    OVERNIGHT EVENTS:      Present Symptoms:   Dyspnea:  No    Nausea/Vomiting:  No    Anxiety/ Agitation No   Depression:   Unable to assess  Fatigue:     Unable  to assess  Loss of appetite:   NA   Constipation: Not Reported    Pain:  No Signs            Location            Duration            Character            Severity            Factors            Effect    Pain AD Score:  [email or web address] (press ctrl + left click to view)    Review of Systems: Reviewed as above  Further ROS unable to  obtain due to poor mentation       MEDICATIONS  (STANDING):  amLODIPine   Tablet 10 milliGRAM(s) Oral daily  bromocriptine Tablet 10 milliGRAM(s) Oral every 8 hours  carvedilol 6.25 milliGRAM(s) Oral <User Schedule>  chlorhexidine 0.12% Liquid 15 milliLiter(s) Oral Mucosa every 12 hours  chlorhexidine 2% Cloths 1 Application(s) Topical daily  doxazosin 4 milliGRAM(s) Oral at bedtime  enoxaparin Injectable 40 milliGRAM(s) SubCutaneous <User Schedule>  folic acid 1 milliGRAM(s) Oral daily  hydrALAZINE 25 milliGRAM(s) Oral every 8 hours  melatonin 3 milliGRAM(s) Oral at bedtime  modafinil 100 milliGRAM(s) Oral daily  multivitamin 1 Tablet(s) Oral daily  nystatin Powder 1 Application(s) Topical two times a day  pantoprazole   Suspension 40 milliGRAM(s) Oral daily  povidone iodine 10% Nasal Swab 1 Application(s) Both Nostrils once  thiamine 100 milliGRAM(s) Oral daily  valsartan 160 milliGRAM(s) Oral <User Schedule>    MEDICATIONS  (PRN):  acetylcysteine 10%  Inhalation 4 milliLiter(s) Inhalation every 6 hours PRN Thick secretions  albuterol    0.083% 2.5 milliGRAM(s) Nebulizer every 6 hours PRN Shortness of Breath and/or Wheezing  fentaNYL    Injectable 25 MICROGram(s) IV Push every 2 hours PRN vent dyssynchrony  hydrALAZINE Injectable 10 milliGRAM(s) IV Push every 2 hours PRN SBP>160  labetalol Injectable 10 milliGRAM(s) IV Push every 2 hours PRN Systolic blood pressure >160  ondansetron Injectable 4 milliGRAM(s) IV Push every 6 hours PRN Nausea and/or Vomiting      PHYSICAL EXAM:  Vital Signs Last 24 Hrs  T(C): 38.2 (08 Jan 2025 12:00), Max: 38.4 (07 Jan 2025 17:00)  T(F): 100.8 (08 Jan 2025 12:00), Max: 101.1 (07 Jan 2025 17:00)  HR: 71 (08 Jan 2025 12:00) (60 - 85)  BP: 157/71 (08 Jan 2025 12:00) (106/52 - 175/73)  BP(mean): 97 (08 Jan 2025 12:00) (69 - 101)  RR: 17 (08 Jan 2025 12:00) (14 - 22)  SpO2: 100% (08 Jan 2025 12:00) (97% - 100%)    Parameters below as of 08 Jan 2025 12:00  Patient On (Oxygen Delivery Method): ventilator    Karnofsky:10  %  General: Elder man NAD intubated  HEENT:  NCAT   ( x )  ET tube    Lungs: comfortable  non labored  CV:  RR  GI:    soft NTND  MSK: bb  Skin:  warm/dry  Neuro  NR    LABS:                          11.5   10.93 )-----------( 228      ( 08 Jan 2025 03:53 )             36.4     01-08    140  |  110[H]  |  29.4[H]  ----------------------------<  122[H]  4.3   |  20.0[L]  |  1.01    Ca    8.0[L]      08 Jan 2025 03:53  Phos  3.5     01-08  Mg     2.5     01-08    TPro  6.2[L]  /  Alb  2.4[L]  /  TBili  0.3[L]  /  DBili  0.1  /  AST  81[H]  /  ALT  183[H]  /  AlkPhos  80  01-08    PT/INR - ( 08 Jan 2025 03:53 )   PT: 13.7 sec;   INR: 1.21 ratio         PTT - ( 08 Jan 2025 03:53 )  PTT:28.7 sec  Urinalysis Basic - ( 08 Jan 2025 03:53 )    Color: x / Appearance: x / SG: x / pH: x  Gluc: 122 mg/dL / Ketone: x  / Bili: x / Urobili: x   Blood: x / Protein: x / Nitrite: x   Leuk Esterase: x / RBC: x / WBC x   Sq Epi: x / Non Sq Epi: x / Bacteria: x      I&O's Summary    07 Jan 2025 07:01  -  08 Jan 2025 07:00  --------------------------------------------------------  IN: 930 mL / OUT: 1735 mL / NET: -805 mL    08 Jan 2025 07:01  -  08 Jan 2025 13:31  --------------------------------------------------------  IN: 0 mL / OUT: 275 mL / NET: -275 mL

## 2025-01-08 NOTE — PROGRESS NOTE ADULT - ASSESSMENT
69 yr old male h/o afib with acute non-traumatic cerebellar ICH, with vasogenic edema, cerebellar compression; obstructive hydrocephalus.  S/p decompressive SOC 12/21/2024 and 12/27.    Patient is critically ill with high probability of imminent neurologic or life-threatening deterioration due to the following diagnoses:  # Cerebellar Hemorrhage - s/p decompressive hemicraniectomy, we are monitoring crani site and exam  # Respiratory Failure - treating with antibiotics and weaning ventilator settings with SBTs    Plan-  Neuro:  - neurochecks q4h  - start low dose modafinil 100mg daily, add melatonin qhs  - bromocriptine 10mg q8h  - no sedation needed  - pain/temp control - can use motrin  Mobility: ROM in bed    Pulm  - trach/PEG today 1/7  - SBT as tolerated  - Osats>92%, normocapnia; vent support  - mucomyst/albuterol prn    CV: 12/23 EF 53%, mod LVH  - Liberalize -160 , labetalol IV PRN  - inc Valsartan 160, coreg 6.25 bid, norvasc, hydral 25 TID  - holding lipitor for transaminitis    GI: # transaminitis  - trend LFTs, minimize hepatotoxic meds  - cont uptitratings TFs; PPI for ulcer ppx  - NPO at midnight, f/u with ACS when to restart TFs  - loose stools with dignicare, banatrol  - folate, thiamine, multivitamins    Renal/: urinary retention  - alvares replaced 12/27 and 1/2, failed TOVs  - monitor e-lytes, I/Os  - cont cardura 4mg    Endo: A1c 6.0  - glucose goal for 120-180    ID:   - rpt sputum culture  - monitor fever curve and WBC    Heme:  - SCDs,  SQL  - rpt UE doppler 1/9     Dispo: pending clinical course  - palliative care following       69 yr old male h/o afib with acute non-traumatic cerebellar ICH, with vasogenic edema, cerebellar compression; obstructive hydrocephalus.  S/p decompressive SOC 12/21/2024 and 12/27.    Patient is critically ill with high probability of imminent neurologic or life-threatening deterioration due to the following diagnoses:  # Cerebellar Hemorrhage - s/p decompressive hemicraniectomy, we are monitoring crani site and exam  # Respiratory Failure - treating with antibiotics and weaning ventilator settings with SBTs    Plan-  Neuro:  - neurochecks q4h  - modafinil 100mg daily, add melatonin qhs  - bromocriptine 10mg q8h  - no sedation needed  - pain/temp control - tylenol PRN  Mobility: ROM in bed    Pulm  - GOC pending this afternoon, holding off on trach/PEG  - SBT as tolerated  - Osats>92%, normocapnia; vent support  - mucomyst/albuterol prn    CV: 12/23 EF 53%, mod LVH  - Liberalize -160 , labetalol IV PRN  - inc Valsartan 160, coreg 6.25 bid, norvasc, hydral 25 TID  - holding lipitor for transaminitis    GI: # transaminitis  - trend LFTs, minimize hepatotoxic meds  - holding TFs for GOC; PPI for ulcer ppx  - loose stools with dignicare, banatrol  - folate, thiamine, multivitamins    Renal/: urinary retention  - alvares replaced 12/27 and 1/2, failed TOVs  - monitor e-lytes, I/Os  - cont cardura 4mg    Endo: A1c 6.0  - glucose goal for 120-180    ID:   - rpt sputum culture  - monitor fever curve and WBC    Heme:  - SCDs,  SQL  - defer rpt LE doppler     Dispo: pending clinical course  - ongoing GOC discussions

## 2025-01-09 NOTE — PROGRESS NOTE ADULT - TIME BILLING
Time spent with review of chart documents, labs, imaging. Direct patient assessment,  formulation of care plan, documentation. Discussion with  Interdisciplinary  team  NSICU, RN - continued close monitoring of symptoms
Time spent with review of chart documents, labs, imaging. Direct patient assessment,  formulation of care plan, documentation. Discussion with  Interdisciplinary  team
Time spent with review of chart documents, labs, imaging. Direct patient assessment,  formulation of care plan, documentation. Discussion with  Interdisciplinary  team  with an additional  ACP  of  18    minutes. This time is exclusive of the encounter
Time spent with review of chart documents, labs, imaging. Direct patient assessment,  formulation of care plan, documentation. Discussion with  Interdisciplinary  team  Dr. Lora with an additional  ACP  of 25     minutes with  MOLST    completion.  This time is exclusive of the encounter

## 2025-01-09 NOTE — CHART NOTE - NSCHARTNOTESELECT_GEN_ALL_CORE
Family Update/Event Note
Nutrition Services
Surgery/Event Note
EEG prelim
Event Note
Event Note
Nutrition Services
Nutrition Services
Surgery Follow-up

## 2025-01-09 NOTE — DISCHARGE NOTE FOR THE EXPIRED PATIENT - OTHER SIGNIFICANT FINDINGS
12/21 CTH: post fossa cerebellar heme with severe ME on 4th, mild dissection of heme into 4th vent, mild hydro, OVH, small L frontal hygroma, L to right shift 3.6mm, mild SAH in L cerebellum    12/21 CTH post op: decompressed, post op changes    12/22 CTH: stable, post op changes     12/22 TTE: EF 53%. 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 Stable b/l SDH. Dilated lateral ventricles are seen with intraventricular air and hemorrhage.    12/24 CTH: stable     12/25 MRI w/wo: post op changes     12/27 CTH: S/p midline SOC and evacuation of R cerebellar hematoma. Residual hemorrhage R cerebellum. Additional small hemorrhages in vermis in L cerebellum moderate mass effect on fourth. Small to mod b/l frontal convexity subdural collections.    12/27 Repeat CTH @5pm: S/p evacuation of R cerebellum hemorrhage. Small amount of residual hemorrhage stable. Foci of postoperative pneumocephalus. A postoperative extradural collection of fluid and gas in the craniectomy defect measures approximately 3 x 6 cm.    12/28 CTH AM: grossly stable post-op changes    12/31 MRB: Postop changes are identified. Areas of hemorrhage in the postop material is identified. Acute infarcts involving both cerebellar hemispheres are seen. Bilateral subdural hematomas are identified.    1/1 CTH: Min Increased ventricle prominence    1/1 CTH f/u: stable     1/2 BLE Dopplers: neg.    1/2 BUE dopplers: negative for DVT but LUE w/ cephalic and basilic vein thrombosis.    1/3 CTH: looks stable    1/5 LED: Neg    1/5 RUQ Abd US: Cholelithiasis without cholecystitis. Nonobstructing right renal stones.    Bladder stone.    1/6 CXR: looks ok    Cultures:    12/24 blood: neg    12/24 sputum: neg    1/2 sputum: commensal mayra c/w body site, Kleb/Raoultella     Labs:    12/24 RVP: neg    12/24 UA: neg

## 2025-01-09 NOTE — PROGRESS NOTE ADULT - REASON FOR ADMISSION
ICH

## 2025-01-09 NOTE — PROGRESS NOTE ADULT - ASSESSMENT
69 yr old male h/o afib with acute non-traumatic cerebellar ICH, with vasogenic edema, cerebellar compression; obstructive hydrocephalus.  S/p decompressive SOC 12/21/2024 and 12/27.    Patient is critically ill with high probability of imminent neurologic or life-threatening deterioration due to the following diagnoses:  # Cerebellar Hemorrhage - s/p decompressive hemicraniectomy, we are monitoring crani site and exam  # Respiratory Failure - treating with antibiotics and weaning ventilator settings with SBTs    Plan-  Neuro:  - neurochecks q4h  - modafinil 100mg daily, add melatonin qhs  - bromocriptine 10mg q8h  - no sedation needed  - pain/temp control - tylenol PRN  Mobility: ROM in bed    Pulm  - GOC pending this afternoon, holding off on trach/PEG  - SBT as tolerated  - Osats>92%, normocapnia; vent support  - mucomyst/albuterol prn    CV: 12/23 EF 53%, mod LVH  - Liberalize -160 , labetalol IV PRN  - inc Valsartan 160, coreg 6.25 bid, norvasc, hydral 25 TID  - holding lipitor for transaminitis    GI: # transaminitis  - trend LFTs, minimize hepatotoxic meds  - holding TFs for GOC; PPI for ulcer ppx  - loose stools with dignicare, banatrol  - folate, thiamine, multivitamins    Renal/: urinary retention  - alvares replaced 12/27 and 1/2, failed TOVs  - monitor e-lytes, I/Os  - cont cardura 4mg    Endo: A1c 6.0  - glucose goal for 120-180    ID:   - rpt sputum culture  - monitor fever curve and WBC    Heme:  - SCDs,  SQL  - defer rpt LE doppler     Dispo: pending clinical course  - ongoing GOC discussions       69 yr old male h/o afib with acute non-traumatic cerebellar ICH, with vasogenic edema, cerebellar compression; obstructive hydrocephalus.  S/p decompressive SOC 12/21/2024 and 12/27.    Patient is critically ill with high probability of imminent neurologic or life-threatening deterioration due to the following diagnoses:  # Cerebellar Hemorrhage - s/p decompressive hemicraniectomy, we are monitoring crani site and exam  # Respiratory Failure - treating with antibiotics and weaning ventilator settings with SBTs    Plan- pending transition to comfort care  Neuro:  - neurochecks q4h  - modafinil 100mg daily, add melatonin qhs  - bromocriptine 10mg q8h  - no sedation needed  - pain/temp control - tylenol PRN  Mobility: ROM in bed    Pulm  - GOC pending this afternoon, holding off on trach/PEG  - SBT as tolerated  - Osats>92%, normocapnia; vent support  - mucomyst/albuterol prn    CV: 12/23 EF 53%, mod LVH  - Liberalize -160 , labetalol IV PRN  - inc Valsartan 160, coreg 6.25 bid, norvasc, hydral 25 TID  - holding lipitor for transaminitis    GI: # transaminitis  - holding TFs for palliative extubation  - loose stools with dignicare, banatrol  - folate, thiamine, multivitamins    Renal/: urinary retention  - alvares replaced 12/27 and 1/2, failed TOVs  - monitor e-lytes, I/Os  - cont cardura 4mg    Endo: A1c 6.0  - glucose goal for 120-180    ID:   - rpt sputum culture  - monitor fever curve and WBC    Heme:  - SCDs,  SQL  - defer rpt LE doppler     Dispo: pending clinical course

## 2025-01-09 NOTE — DISCHARGE NOTE FOR THE EXPIRED PATIENT - HOSPITAL COURSE
12/21 admitted s/p SOC and clot evac in OR, intraop developed diffuse urticaria like rash prior to Ancef poss from rocuronium     12/22: Extubated, very agitated, gave Ativan and phenobarbital. Start SQL.     12/23: Intubated for inc WOB Added Librium & VPA for withdrawal.     12/24: started empiric Zosyn for ?PNA. Decr IVFs. 18 beats Vtach    12/26: Norvasc 5mg for persistent elevated BP, Hep to SQL. Incr metop. Started EEG.     12/27: Norvasc to 10mg. Dec Librium 25mg q8. Dc oxy. Dc'd EEG. Zosyn to Rocephin. CTH w/ new R cerebellar bleed & worsening b/l SDH - OR for emergent SOC    12/28: D/c'd Librium. Valsartan 80 qd started.ON: low tidal volumes on CPAP. switched to Coreg25 BID, d/c metop     12/29:  q6 started. ISS inc. MRI ordered. Hydralazine increased to 100 TID. Broadened Abx, sputum cx sent prior.    12/30: Q4, SQL. dec TFs, pall care. Albumin/:Lasix. NSGY Signed Off    12/31: MRI, Keppra, EEG    1/1: d/c'd NS, Inc FW to 300q6. TOV. Inc TF back to goal. Added amantadine. Dec Hydral to 75mg TID    1/2: straight cath x 2. EEG d/c. Pan cultured. Yeh replaced    1/3: d/c hydralazine, dec Valsartan. Start bromo. Hypotensive, s/p 500 bolus. Multiple BMs placed dignacare, d/c'd bowel reg     ON given 500 cc bolus NS for soft BP    1/4: Dc'd NIHSS, dced keppra     1/5: ON: Stacked breathing. Febrile, ofirmev x 1 given. Tylenol and amantadine dc'd, d/t elevated LFTs, motrin prn and modafinil started. Abd US ordered. Valsartan increased to 160mg. SQL restarted.    1/6: ON febrile, motrin given. Free water dc'd. Sputum and blood cx sent.    1/7: ON: added hydralazine 25mg TID for BP control. D/c ISS    1/8: GOC. No plan for Trach/PEG

## 2025-01-09 NOTE — PROGRESS NOTE ADULT - SUBJECTIVE AND OBJECTIVE BOX
CC:  Follow up GOC , Symptoms    OVERNIGHT EVENTS:  family wishes to withdraw the vent today      Present Symptoms:   Dyspnea:  No  on vent  Nausea/Vomiting:  No    Anxiety/ Agitation No   Depression:Unable to assess  Fatigue:    Unable  to assess  Loss of appetite  NA   Constipation: Not Reported     Pain:   No Signs            Location            Duration            Character            Severity            Factors            Effect    Pain AD Score:  [email or web address] (press ctrl + left click to view)    Review of Systems: Reviewed as above  Further ROS unable limited to  obtain due to poor mentation       MEDICATIONS  (STANDING):  HYDROmorphone Infusion 2 mG/Hr (2 mL/Hr) IV Continuous <Continuous>    MEDICATIONS  (PRN):  glycopyrrolate Injectable 0.4 milliGRAM(s) IV Push four times a day PRN Secretions  HYDROmorphone  Injectable 2 milliGRAM(s) IV Push every 2 hours PRN Moderate pain (4-6), Severe pain (7-10), Respiratory rate greater than 22  HYDROmorphone  Injectable 2 milliGRAM(s) IV Push once PRN Dyspnea pain  LORazepam   Injectable 2 milliGRAM(s) IV Push every 2 hours PRN Agitation  LORazepam   Injectable 2 milliGRAM(s) IV Push once PRN anxiety, dypsnea  ondansetron Injectable 4 milliGRAM(s) IV Push every 6 hours PRN Nausea and/or Vomiting      PHYSICAL EXAM:  Vital Signs Last 24 Hrs  T(C): 37.9 (09 Jan 2025 13:00), Max: 38.9 (08 Jan 2025 14:00)  T(F): 100.2 (09 Jan 2025 13:00), Max: 102 (08 Jan 2025 14:00)  HR: 83 (09 Jan 2025 13:00) (57 - 87)  BP: 141/85 (09 Jan 2025 13:00) (102/52 - 171/73)  BP(mean): 96 (09 Jan 2025 13:00) (65 - 102)  RR: 14 (09 Jan 2025 13:00) (11 - 25)  SpO2: 99% (09 Jan 2025 13:00) (95% - 100%)    Parameters below as of 09 Jan 2025 12:00  Patient On (Oxygen Delivery Method): ventilator    O2 Concentration (%): 40    Karnofsky: 10%  General:   M intubated     HEENT:  NCAT       ( x )  ET tube   (  )  NGT  Lungs: comfortable  non labored  CV:  RR  MSK: bb  Skin:  warm/dry  Neuro  nr  LABS:                          9.6    9.49  )-----------( 209      ( 09 Jan 2025 03:15 )             30.4     01-09    143  |  113[H]  |  28.2[H]  ----------------------------<  99  4.1   |  20.0[L]  |  1.05    Ca    7.7[L]      09 Jan 2025 03:15  Phos  3.5     01-09  Mg     2.3     01-09    TPro  6.2[L]  /  Alb  2.4[L]  /  TBili  0.3[L]  /  DBili  0.1  /  AST  81[H]  /  ALT  183[H]  /  AlkPhos  80  01-08    PT/INR - ( 08 Jan 2025 03:53 )   PT: 13.7 sec;   INR: 1.21 ratio         PTT - ( 08 Jan 2025 03:53 )  PTT:28.7 sec  Urinalysis Basic - ( 09 Jan 2025 03:15 )    Color: x / Appearance: x / SG: x / pH: x  Gluc: 99 mg/dL / Ketone: x  / Bili: x / Urobili: x   Blood: x / Protein: x / Nitrite: x   Leuk Esterase: x / RBC: x / WBC x   Sq Epi: x / Non Sq Epi: x / Bacteria: x      I&O's Summary    08 Jan 2025 07:01  -  09 Jan 2025 07:00  --------------------------------------------------------  IN: 0 mL / OUT: 1495 mL / NET: -1495 mL    09 Jan 2025 07:01  -  09 Jan 2025 13:56  --------------------------------------------------------  IN: 80 mL / OUT: 375 mL / NET: -295 mL      DNR/I  and comfort measures

## 2025-01-09 NOTE — PROGRESS NOTE ADULT - ASSESSMENT
69 year old male PMHx afib (on eliquis with prior ischemic stroke 2018), HTN, CABG x 2, ETOH use transferred from [place] for ICH. Patient admitted to NEURO ICU for monitoring, Palliative consulted for support as patient condition not improving, no gag/cough reflex and poor neuro exam.      Acute Respiratory Failure  vent dependent  wife wishes elective extubation    Dyspnea  Dilaudid infusion 2mg/hr started  Dilaudid 2mg q2hr PRN  If taking multiple doses of Dilaudid  -increase drip  Ativan 2mg IVP q2hr PRN    Secretions  Robinul 0.4mg IVP PRN    ICH  NEURO ICU management  Noted CT Head 12.27.24/12.28.24    Fevers  likely central  medicate for comfrot    Debility  Assist in with care    Encounter for Palliative Care  Wife reported to have reviewed husbands Living Will and now  reconsidering trach/peg. She will speak to her children  HCP and Living Will Reviewed - patient would not want LST    See [facility] note above for details.  In summary  Family wishes to proceed with elective extubation.  They understand this will lead to death. Informed family prognosis at that point minutes/hours though at times could be days.  Focus is comfort  Explained use of medication for symptom management.   Family agreeable  Close monitoring of symptoms

## 2025-01-09 NOTE — PROGRESS NOTE ADULT - SUBJECTIVE AND OBJECTIVE BOX
Chief complaint:   69 yr old male history of htn, hld , on afib eliquis with prior ischemic stroke 2018, frequent utis, kidney stones, turp, cabg x 2, 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  went to [place], ich, vomited gcs 13 intubated.  Transferred to [facility] for possible SOC.  On arrival patient is sedated on versed, propofol and fent.    MRS 0  ICH score 3  NIHSS 25 (21 Dec 2024 16:13)    24hr EVENTS:  1/5 - fever overnight, improved with motrin  1/6 - restarted hydral 25 TID, Tmax 38.4  1/7 - GOC discussion - wife brought in patient's living will which indicated that he would not want any artificial means of survival including tubes or respirator. I explained that the living will is meant as a guide for her as patient's healthcare proxy. I explained that his prognosis remains poor and that he will very likely require long term ventilator support and be unable to care for himself. Regarding awakening, I explained that, while I can't say for certain, he has not indicated significant awakening beyond eye opening and that the likelihood that he would ever be able to interact in any meaningful way is extremely low. I explained that he would need to be in a facility long term and will not be able to return home. Wife said that given the poor prognosis and his living will stating he would not want to be sustained by artificial means, she would like to hold off on trach/PEG and likely pursue comfort measures, which I explained. We agreed to follow-up in the evening/tomorrow after her discussion with her children.  1/8 PM conversation with wife - she wishes to pursue palliative extubation, she will inform us of the timing after speaking with her children - plan for either 1/9 or 1/10    ROS: [x]  Unable to assess due to mental status   All other systems negative      -----------------------------------------------------------------------------------------------------------------------------------------------------------------------------------  PHYSICAL EXAM:  General: Calm, intubated  HEENT: MMM  Neuro:  -Mental status- No acute distress, spont EO, grimaces to nox, no FC  -CN- PERRL 3mm, gaze midline, does not attend, +cough/gag/corneals  LUE posturing, RUE no mvmt; LLE trace mvmt, RLE no mvmt    CV: regular rhythm, mild bradycardia  Pulm: normal WOB, minimal oral secretions, no in-line secretions, failed PS  Abd: Soft, nontender, nondistended, L nare NGT, dignicare  : Yeh with sedimented yellow urine  Ext: no noted edema in lower ext  Skin: warm, dry, IAD    RUE midline    -----------------------------------------------------------------------------------------------------  ICU Vital Signs Last 24 Hrs  T(C): 38.3 (09 Jan 2025 09:00), Max: 38.9 (08 Jan 2025 14:00)  T(F): 100.9 (09 Jan 2025 09:00), Max: 102 (08 Jan 2025 14:00)  HR: 74 (09 Jan 2025 09:00) (57 - 79)  BP: 150/66 (09 Jan 2025 09:00) (102/52 - 171/73)  BP(mean): 89 (09 Jan 2025 09:00) (65 - 98)  ABP: 146/70 (09 Jan 2025 07:00) (86/38 - 175/57)  ABP(mean): 86 (09 Jan 2025 07:00) (52 - 94)  RR: 25 (09 Jan 2025 09:00) (11 - 25)  SpO2: 98% (09 Jan 2025 09:00) (95% - 100%)    O2 Parameters below as of 09 Jan 2025 08:00  Patient On (Oxygen Delivery Method): ventilator    O2 Concentration (%): 40        I&O's Summary    08 Jan 2025 07:01  -  09 Jan 2025 07:00  --------------------------------------------------------  IN: 0 mL / OUT: 1495 mL / NET: -1495 mL    09 Jan 2025 07:01  -  09 Jan 2025 09:37  --------------------------------------------------------  IN: 0 mL / OUT: 100 mL / NET: -100 mL        MEDICATIONS  (STANDING):  amLODIPine   Tablet 10 milliGRAM(s) Oral daily  bromocriptine Tablet 10 milliGRAM(s) Oral every 8 hours  carvedilol 6.25 milliGRAM(s) Oral <User Schedule>  chlorhexidine 0.12% Liquid 15 milliLiter(s) Oral Mucosa every 12 hours  chlorhexidine 2% Cloths 1 Application(s) Topical daily  doxazosin 4 milliGRAM(s) Oral at bedtime  enoxaparin Injectable 40 milliGRAM(s) SubCutaneous <User Schedule>  folic acid 1 milliGRAM(s) Oral daily  hydrALAZINE 25 milliGRAM(s) Oral every 8 hours  melatonin 3 milliGRAM(s) Oral at bedtime  modafinil 100 milliGRAM(s) Oral daily  multivitamin 1 Tablet(s) Oral daily  nystatin Powder 1 Application(s) Topical two times a day  pantoprazole   Suspension 40 milliGRAM(s) Oral daily  povidone iodine 10% Nasal Swab 1 Application(s) Both Nostrils once  thiamine 100 milliGRAM(s) Oral daily  valsartan 160 milliGRAM(s) Oral <User Schedule>      RESPIRATORY:  Mode: CPAP with PS  FiO2: 40  PEEP: 6  PS: 10  MAP: 11        IMAGING:   Recent imaging studies were reviewed.    LAB RESULTS:                          9.6    9.49  )-----------( 209      ( 09 Jan 2025 03:15 )             30.4       PT/INR - ( 08 Jan 2025 03:53 )   PT: 13.7 sec;   INR: 1.21 ratio         PTT - ( 08 Jan 2025 03:53 )  PTT:28.7 sec    01-09    143  |  113[H]  |  28.2[H]  ----------------------------<  99  4.1   |  20.0[L]  |  1.05    Ca    7.7[L]      09 Jan 2025 03:15  Phos  3.5     01-09  Mg     2.3     01-09    TPro  6.2[L]  /  Alb  2.4[L]  /  TBili  0.3[L]  /  DBili  0.1  /  AST  81[H]  /  ALT  183[H]  /  AlkPhos  80  01-08                -----------------------------------------------------------------------------------------------------------------------------------------------------------------------------------           Chief complaint:   69 yr old male history of htn, hld , on afib eliquis with prior ischemic stroke 2018, frequent utis, kidney stones, turp, cabg x 2, 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  went to [place], ich, vomited gcs 13 intubated.  Transferred to [facility] for possible SOC.  On arrival patient is sedated on versed, propofol and fent.    MRS 0  ICH score 3  NIHSS 25 (21 Dec 2024 16:13)    24hr EVENTS:  1/5 - fever overnight, improved with motrin  1/6 - restarted hydral 25 TID, Tmax 38.4  1/7 - GOC discussion - wife brought in patient's living will which indicated that he would not want any artificial means of survival including tubes or respirator. I explained that the living will is meant as a guide for her as patient's healthcare proxy. I explained that his prognosis remains poor and that he will very likely require long term ventilator support and be unable to care for himself. Regarding awakening, I explained that, while I can't say for certain, he has not indicated significant awakening beyond eye opening and that the likelihood that he would ever be able to interact in any meaningful way is extremely low. I explained that he would need to be in a facility long term and will not be able to return home. Wife said that given the poor prognosis and his living will stating he would not want to be sustained by artificial means, she would like to hold off on trach/PEG and likely pursue comfort measures, which I explained. We agreed to follow-up in the evening/tomorrow after her discussion with her children.  1/8 PM conversation with wife - she wishes to pursue palliative extubation, she will inform us of the timing after speaking with her children - plan for either 1/9 or 1/10  1/9 plan for palliative extubation in PM    ROS: [x]  Unable to assess due to mental status   All other systems negative      -----------------------------------------------------------------------------------------------------------------------------------------------------------------------------------  PHYSICAL EXAM:  General: Calm, intubated  HEENT: MMM  Neuro:  -Mental status- No acute distress, spont EO, grimaces to nox, no FC  -CN- PERRL 3mm, gaze midline, does not attend, +cough/gag/corneals  LUE posturing, RUE no mvmt; LLE trace mvmt, RLE no mvmt    CV: regular rhythm, mild bradycardia  Pulm: normal WOB, minimal oral secretions, no in-line secretions, failed PS  Abd: Soft, nontender, nondistended, L nare NGT, dignicare  : Yeh with sedimented yellow urine  Ext: no noted edema in lower ext  Skin: warm, dry, IAD    RUE midline    -----------------------------------------------------------------------------------------------------  ICU Vital Signs Last 24 Hrs  T(C): 38.3 (09 Jan 2025 09:00), Max: 38.9 (08 Jan 2025 14:00)  T(F): 100.9 (09 Jan 2025 09:00), Max: 102 (08 Jan 2025 14:00)  HR: 74 (09 Jan 2025 09:00) (57 - 79)  BP: 150/66 (09 Jan 2025 09:00) (102/52 - 171/73)  BP(mean): 89 (09 Jan 2025 09:00) (65 - 98)  ABP: 146/70 (09 Jan 2025 07:00) (86/38 - 175/57)  ABP(mean): 86 (09 Jan 2025 07:00) (52 - 94)  RR: 25 (09 Jan 2025 09:00) (11 - 25)  SpO2: 98% (09 Jan 2025 09:00) (95% - 100%)    O2 Parameters below as of 09 Jan 2025 08:00  Patient On (Oxygen Delivery Method): ventilator    O2 Concentration (%): 40        I&O's Summary    08 Jan 2025 07:01  -  09 Jan 2025 07:00  --------------------------------------------------------  IN: 0 mL / OUT: 1495 mL / NET: -1495 mL    09 Jan 2025 07:01  -  09 Jan 2025 09:37  --------------------------------------------------------  IN: 0 mL / OUT: 100 mL / NET: -100 mL        MEDICATIONS  (STANDING):  amLODIPine   Tablet 10 milliGRAM(s) Oral daily  bromocriptine Tablet 10 milliGRAM(s) Oral every 8 hours  carvedilol 6.25 milliGRAM(s) Oral <User Schedule>  chlorhexidine 0.12% Liquid 15 milliLiter(s) Oral Mucosa every 12 hours  chlorhexidine 2% Cloths 1 Application(s) Topical daily  doxazosin 4 milliGRAM(s) Oral at bedtime  enoxaparin Injectable 40 milliGRAM(s) SubCutaneous <User Schedule>  folic acid 1 milliGRAM(s) Oral daily  hydrALAZINE 25 milliGRAM(s) Oral every 8 hours  melatonin 3 milliGRAM(s) Oral at bedtime  modafinil 100 milliGRAM(s) Oral daily  multivitamin 1 Tablet(s) Oral daily  nystatin Powder 1 Application(s) Topical two times a day  pantoprazole   Suspension 40 milliGRAM(s) Oral daily  povidone iodine 10% Nasal Swab 1 Application(s) Both Nostrils once  thiamine 100 milliGRAM(s) Oral daily  valsartan 160 milliGRAM(s) Oral <User Schedule>      RESPIRATORY:  Mode: CPAP with PS  FiO2: 40  PEEP: 6  PS: 10  MAP: 11        IMAGING:   Recent imaging studies were reviewed.    LAB RESULTS:                          9.6    9.49  )-----------( 209      ( 09 Jan 2025 03:15 )             30.4       PT/INR - ( 08 Jan 2025 03:53 )   PT: 13.7 sec;   INR: 1.21 ratio         PTT - ( 08 Jan 2025 03:53 )  PTT:28.7 sec    01-09    143  |  113[H]  |  28.2[H]  ----------------------------<  99  4.1   |  20.0[L]  |  1.05    Ca    7.7[L]      09 Jan 2025 03:15  Phos  3.5     01-09  Mg     2.3     01-09    TPro  6.2[L]  /  Alb  2.4[L]  /  TBili  0.3[L]  /  DBili  0.1  /  AST  81[H]  /  ALT  183[H]  /  AlkPhos  80  01-08                -----------------------------------------------------------------------------------------------------------------------------------------------------------------------------------

## 2025-01-09 NOTE — PROGRESS NOTE ADULT - CRITICAL CARE ATTENDING COMMENT
I spent the above minutes of critical care time examining patient, reviewing vitals, labs, medications, imaging, goals of care discussions with family, and discussing with the team the prevention of threat to life in this patient who is at high risk for deterioration or death due to:  ICH, respiratory failure, encephalopathy, dysphagia, urinary retention, fever. I spent the above minutes of critical care time examining patient, reviewing vitals, labs, medications, imaging, goals of care discussions with family, and discussing with the team the prevention of threat to life in this patient who is at high risk for deterioration or death due to:  ICH, respiratory failure, encephalopathy, dysphagia.

## 2025-01-09 NOTE — DISCHARGE NOTE FOR THE EXPIRED PATIENT - NAME OF PERSON WHO MADE CONTACTED FAMILY
From: Joaquin Gates  To: Jeremy Harrell  Sent: 3/26/2024 9:47 AM CDT  Subject: My MG….words     Hi Dr. Harrell and Klever….    Sorry for bothering you however after seeing you Dr. Harrell on the 18th and adding another dose of Pyriodostigmine(4 times a day), my speech has gotten worse (was in [place] over weekend watching my granddaughters play VB) and now noticing when I look to the far left, I am starting to see double which is a bit concerning!! Im trying to stay calm….  Just wondering what your thoughts are…..    Look forward to hearing from you…..    Sincerely,   Joaquin Gates   Dr. Lora

## 2025-01-09 NOTE — PROGRESS NOTE ADULT - NUTRITIONAL ASSESSMENT
This patient has been assessed with a concern for Malnutrition and has been determined to have a diagnosis/diagnoses of Moderate protein-calorie malnutrition.    This patient is being managed with:   Diet NPO after Midnight-     NPO Start Date: 03-Jan-2025   NPO Start Time: 23:59  Entered: Tera  3 2025  6:32PM    Diet NPO with Tube Feed-  Tube Feeding Modality: Nasogastric  Jevity 1.5 Seun (JEVITY1.5RTH)  Total Volume for 24 Hours (mL): 1170  Continuous  Starting Tube Feed Rate {mL per Hour}: 10  Increase Tube Feed Rate by (mL): 10     Every 4 hours  Until Goal Tube Feed Rate (mL per Hour): 65  Tube Feed Duration (in Hours): 18  Tube Feed Start Time: 11:00  Free Water Flush  Bolus   Total Volume per Flush (mL): 300   Frequency: Every 6 Hours  Banatrol TF     Qty per Day:  3  Entered: Tera  3 2025  3:00PM  
This patient has been assessed with a concern for Malnutrition and has been determined to have a diagnosis/diagnoses of Moderate protein-calorie malnutrition.    This patient is being managed with:   Diet NPO with Tube Feed-  Tube Feeding Modality: Nasogastric  Jevity 1.5 Seun (JEVITY1.5RTH)  Total Volume for 24 Hours (mL): 720  Continuous  Starting Tube Feed Rate {mL per Hour}: 10  Increase Tube Feed Rate by (mL): 10     Every 4 hours  Until Goal Tube Feed Rate (mL per Hour): 30  Tube Feed Duration (in Hours): 24  Tube Feed Start Time: 11:00  Free Water Flush  Bolus   Total Volume per Flush (mL): 300   Frequency: Every 6 Hours  Entered: Jan 1 2025 11:35AM  
This patient has been assessed with a concern for Malnutrition and has been determined to have a diagnosis/diagnoses of Moderate protein-calorie malnutrition.    This patient is being managed with:   Diet NPO after Midnight-     NPO Start Date: 07-Jan-2025   NPO Start Time: 23:59  Entered: Jan 7 2025  7:06AM    Diet NPO with Tube Feed-  Tube Feeding Modality: Nasogastric  Jevity 1.5 Seun (JEVITY1.5RTH)  Total Volume for 24 Hours (mL): 1170  Continuous  Starting Tube Feed Rate {mL per Hour}: 10  Increase Tube Feed Rate by (mL): 10     Every 4 hours  Until Goal Tube Feed Rate (mL per Hour): 65  Tube Feed Duration (in Hours): 18  Tube Feed Start Time: 11:00  Bolus  Entered: Jan 7 2025  5:43AM  
This patient has been assessed with a concern for Malnutrition and has been determined to have a diagnosis/diagnoses of Moderate protein-calorie malnutrition.    This patient is being managed with:   Diet NPO after Midnight-     NPO Start Date: 05-Jan-2025   NPO Start Time: 23:59  Except Medications  Entered: Jan 5 2025 10:19AM    Diet NPO with Tube Feed-  Tube Feeding Modality: Nasogastric  Jevity 1.5 Seun (JEVITY1.5RTH)  Total Volume for 24 Hours (mL): 1170  Continuous  Starting Tube Feed Rate {mL per Hour}: 10  Increase Tube Feed Rate by (mL): 10     Every 4 hours  Until Goal Tube Feed Rate (mL per Hour): 65  Tube Feed Duration (in Hours): 18  Tube Feed Start Time: 12:30  Free Water Flush  Bolus   Total Volume per Flush (mL): 300   Frequency: Every 6 Hours  Banatrol TF     Qty per Day:  3  Entered: Jan 4 2025 10:31AM  
This patient has been assessed with a concern for Malnutrition and has been determined to have a diagnosis/diagnoses of Moderate protein-calorie malnutrition.    This patient is being managed with:   Diet NPO with Tube Feed-  Tube Feeding Modality: Nasogastric  Jevity 1.5 Seun (JEVITY1.5RTH)  Total Volume for 24 Hours (mL): 720  Continuous  Starting Tube Feed Rate {mL per Hour}: 10  Increase Tube Feed Rate by (mL): 10     Every 4 hours  Until Goal Tube Feed Rate (mL per Hour): 30  Tube Feed Duration (in Hours): 24  Tube Feed Start Time: 11:00  Free Water Flush  Bolus   Total Volume per Flush (mL): 250   Frequency: Every 6 Hours  Entered: Dec 30 2024  9:46AM  
This patient has been assessed with a concern for Malnutrition and has been determined to have a diagnosis/diagnoses of Moderate protein-calorie malnutrition.    This patient is being managed with:   Diet NPO with Tube Feed-  Tube Feeding Modality: Nasogastric  Jevity 1.5 Seun (JEVITY1.5RTH)  Total Volume for 24 Hours (mL): 720  Continuous  Starting Tube Feed Rate {mL per Hour}: 10  Increase Tube Feed Rate by (mL): 10     Every 4 hours  Until Goal Tube Feed Rate (mL per Hour): 30  Tube Feed Duration (in Hours): 24  Tube Feed Start Time: 11:00  Free Water Flush  Bolus   Total Volume per Flush (mL): 300   Frequency: Every 6 Hours  Entered: Jan 1 2025 11:35AM  
This patient has been assessed with a concern for Malnutrition and has been determined to have a diagnosis/diagnoses of Moderate protein-calorie malnutrition.    This patient is being managed with:   Diet NPO after Midnight-     NPO Start Date: 05-Jan-2025   NPO Start Time: 23:59  Except Medications  Entered: Jan 5 2025 10:19AM    Diet NPO with Tube Feed-  Tube Feeding Modality: Nasogastric  Jevity 1.5 Seun (JEVITY1.5RTH)  Total Volume for 24 Hours (mL): 1170  Continuous  Starting Tube Feed Rate {mL per Hour}: 10  Increase Tube Feed Rate by (mL): 10     Every 4 hours  Until Goal Tube Feed Rate (mL per Hour): 65  Tube Feed Duration (in Hours): 18  Tube Feed Start Time: 12:30  Free Water Flush  Bolus   Total Volume per Flush (mL): 300   Frequency: Every 6 Hours  Banatrol TF     Qty per Day:  3  Entered: Jan 4 2025 10:31AM  
This patient has been assessed with a concern for Malnutrition and has been determined to have a diagnosis/diagnoses of Moderate protein-calorie malnutrition.    This patient is being managed with:   Diet NPO after Midnight-     NPO Start Date: 07-Jan-2025   NPO Start Time: 23:59  Entered: Jan 7 2025  7:06AM    Diet NPO with Tube Feed-  Tube Feeding Modality: Nasogastric  Jevity 1.5 Seun (JEVITY1.5RTH)  Total Volume for 24 Hours (mL): 1170  Continuous  Starting Tube Feed Rate {mL per Hour}: 10  Increase Tube Feed Rate by (mL): 10     Every 4 hours  Until Goal Tube Feed Rate (mL per Hour): 65  Tube Feed Duration (in Hours): 18  Tube Feed Start Time: 11:00  Bolus  Entered: Jan 7 2025  5:43AM  
This patient has been assessed with a concern for Malnutrition and has been determined to have a diagnosis/diagnoses of Moderate protein-calorie malnutrition.    This patient is being managed with:   Diet NPO after Midnight-     NPO Start Date: 07-Jan-2025   NPO Start Time: 23:59  Entered: Jan 7 2025  7:06AM    Diet NPO with Tube Feed-  Tube Feeding Modality: Nasogastric  Jevity 1.5 Seun (JEVITY1.5RTH)  Total Volume for 24 Hours (mL): 1170  Continuous  Starting Tube Feed Rate {mL per Hour}: 10  Increase Tube Feed Rate by (mL): 10     Every 4 hours  Until Goal Tube Feed Rate (mL per Hour): 65  Tube Feed Duration (in Hours): 18  Tube Feed Start Time: 11:00  Bolus  Entered: Jan 7 2025  5:43AM

## 2025-01-09 NOTE — CHART NOTE - NSCHARTNOTEFT_GEN_A_CORE
DEATH NOTE    Called to bedside to evaluate the patient for expiration.     On physical exam, patient did not respond to verbal or noxious stimuli.  No spontaneous respirations.  Absent heart and breath sounds.  Absent radial and carotid pulses.   Pupils are fixed and dilated, no corneal reflex.  EKG rhythm strip shows asystole.   Patient pronounced dead at 15:39.  Attending at bedside.  Family declines autopsy.
EEG preliminary read (not final) on the initial recording hour(s) = ~1.5 hr    No seizures.  Occasional generalized sharp waves, at times with triphasic morphology, which can be seen in toxic-metabolic encephalopathies and indicate some risk of generalized seizures, especially when at higher frequencies than in this study.      Final report to follow tomorrow morning after completion of study.    EEG Reading Room Ph#: [phone]  Epilepsy Answering Service after 5PM and before 8:30AM: Ph#: [phone]
Nutrition note:   Aware Palliative following for GOC; per Palliative notes; wife reconsidering Trach/PEG. Pt remains NPO for possible Trach/PEG noted. Pending GOC meeting later today. RD to Follow up tomorrow with further recommendations post family meeting.   RD to remain available PRN.
Palliative care social work note.     and palliative care physician Dr Small met with patients spouse to provide support and engage I discussions regarding next steps and decisions made by family. DR Uzair Timmons joined meeting. Spouse reports that she and her family have decided to proceed with trach and Peg and want to give patient more time to possibly recover. Options at later time regarding no escalation in care, comfort or withdraw of life support reviewed as options if family no longer feels situation at that time is in line for quality of life . Spouse appreciative of discussions. Questions clarified regarding Medicare and transitionto LT care at facility.
Patient followed by ACS service  Plan for Trach and PEG tomorrow  Please hold tube feeds at midnight and pre-op accordingly.  Thank you.    Please call [phone] if any questions.
Source: Patient [ ]  Family [ ]   other [x ]  EMR and staff     Current Diet: Diet, NPO with Tube Feed:   Tube Feeding Modality: Nasogastric  Jevity 1.5 Seun (JEVITY1.5RTH)  Total Volume for 24 Hours (mL): 720  Continuous  Starting Tube Feed Rate {mL per Hour}: 10  Increase Tube Feed Rate by (mL): 10     Every 4 hours  Until Goal Tube Feed Rate (mL per Hour): 30  Tube Feed Duration (in Hours): 24  Tube Feed Start Time: 11:00  Free Water Flush  Bolus   Total Volume per Flush (mL): 250   Frequency: Every 6 Hours (12-30-24 @ 09:45)      Enteral /Parenteral Nutrition: Jevity 1.5 @ 30ml/hr (x24 hours) 720ml/day; 1080kcal, 46gm protein, 547ml free water     Current Weight:   12/28: 104.3 kg   12/24: 110.9 kg   12/22: 101.7 kg   (3+ edema, B/L arms, 4+ edema B/L feet, Generalized edema)     % Weight Change: Unsure of accuracy of weights secondary to inconsistency; will continue to monitor weights for trends.     Pertinent Medications: MEDICATIONS  (STANDING):  acetaminophen   IVPB .. 1000 milliGRAM(s) IV Intermittent once  acetylcysteine 10%  Inhalation 4 milliLiter(s) Inhalation every 6 hours  albuterol    0.083% 2.5 milliGRAM(s) Nebulizer every 6 hours  atorvastatin 40 milliGRAM(s) Oral at bedtime  carvedilol 25 milliGRAM(s) Oral every 12 hours  chlorhexidine 0.12% Liquid 15 milliLiter(s) Oral Mucosa every 12 hours  doxazosin 4 milliGRAM(s) Oral at bedtime  enoxaparin Injectable 40 milliGRAM(s) SubCutaneous <User Schedule>  fentaNYL    Injectable 50 MICROGram(s) IV Push once  folic acid 1 milliGRAM(s) Oral daily  insulin lispro (ADMELOG) corrective regimen sliding scale   SubCutaneous every 6 hours  levETIRAcetam   Injectable 750 milliGRAM(s) IV Push every 12 hours  multivitamin 1 Tablet(s) Oral daily  pantoprazole   Suspension 40 milliGRAM(s) Oral daily  polyethylene glycol 3350 17 Gram(s) Oral daily  senna 2 Tablet(s) Oral at bedtime    MEDICATIONS  (PRN):  hydrALAZINE Injectable 10 milliGRAM(s) IV Push every 2 hours PRN SBP>160  labetalol Injectable 10 milliGRAM(s) IV Push every 2 hours PRN SBP>160  ondansetron Injectable 4 milliGRAM(s) IV Push every 6 hours PRN Nausea and/or Vomiting    Pertinent Labs: CBC Full  -  ( 31 Dec 2024 05:30 )  WBC Count : 12.80 K/uL  RBC Count : 4.26 M/uL  Hemoglobin : 13.1 g/dL  Hematocrit : 41.6 %  Platelet Count - Automated : 195 K/uL  Mean Cell Volume : 97.7 fl  Mean Cell Hemoglobin : 30.8 pg  Mean Cell Hemoglobin Concentration : 31.5 g/dL    Skin: Coccyx stage 1     Nutrition focused physical exam-briefly conducted - found signs of malnutrition [ ]absent [ x]present    Subcutaneous fat loss: [ ] Orbital fat pads region, Mild [x ]Buccal fat region, [ ]Triceps region,  [ ]Ribs region    Muscle wasting: mild [x ]Temples region, [x ]Clavicle region, [ x]Shoulder region, [ ]Scapula region, [ ]Interosseous region,  [ ]thigh region, [ ]Calf region    Estimated Needs:   [x ] no change since previous assessment  [ ] recalculated:     Hospital Course:   Pt is a 69 year old male with acute non-traumatic cerebellar ICH, with vasogenic edema, cerebellar compression; obstructive hydrocephalus.  S/p decompressive SOC 12/21/2024 and 12/27.     Current Nutrition Diagnosis:  Pt remains at high nutrition risk secondary to Moderate (acute) protein calorie malnutrition related to inability to meet sufficient protein-energy needs in setting of non-traumatic cerebellar ICH, S/p decompressive SOC; remains on vent support as evidenced by meeting < 75% needs > 7 day, physical signs of mild muscle/fat loss + edema. Pt receiving enteral feeds of Jevity 1.5 @ 30ml/hr x 24 hours; not yet meeting estimated nutrition needs. Recommendations below:     Recommendations:   1. Continue with MVI and folic acid daily   2. RX: Thiamine daily   3. Change enteral regime per ICU Protocol; Jevity 1.5 @ 65ml/hr (x18 hours) 1170ml/day; 1755kcal, 75gm protein, 889ml free water  4. Check weight daily to monitor trends     Monitoring and Evaluation:   [ ] PO intake [x ] Tolerance to diet prescription [X] Weights  [X] Follow up per protocol [X] Labs:
I updated wife and sister inlaw at bedside of clinical course and options moving forward. I explained that we aren't seeing much anatomical reason for his exam and that sometimes brain injury requires a long waiting time to see if recovery is possible. This could be in the order of months and would require a trach/PEG and vent facility placement. We are not seeing seizures on EEG, but cerebral dysfunction.  They will continue to discuss his wishes with family. Will follow up
Notified by Neuro ICU team that patients wife has changed mind and no longer wants trach and PEG for patient. Surgery will sign off. Please call if plans change.
Nutrition note:   Per palliative note; "Wife wishes to withdraw vent - Thursday or Friday.  Just waiting for her children to be present". Trach and PEG on hold at this time. NPO status maintained. RD to continue to monitor pt and remain available PRN.

## 2025-01-09 NOTE — PROGRESS NOTE ADULT - THIS PATIENT HAS THE FOLLOWING CONDITION(S)/DIAGNOSES ON THIS ADMISSION:
Encephalopathy/Cerebral Edema
None
Encephalopathy
Encephalopathy
None
Brain Compression / Herniation
None
Encephalopathy
Encephalopathy
None

## 2025-01-09 NOTE — PROGRESS NOTE ADULT - PROVIDER SPECIALTY LIST ADULT
NSICU
Neurology
Neurosurgery
Palliative Care
Palliative Care
NSICU
Neurosurgery
Palliative Care
Surgery
NSICU
Neurology
Neurology
Neurosurgery
Neurosurgery
Surgery
NSICU
Neurology
Neurosurgery
NSICU
Neurosurgery
NSICU
NSICU
Palliative Care
Palliative Care
NSICU
Palliative Care

## 2025-01-12 LAB
CULTURE RESULTS: SIGNIFICANT CHANGE UP
CULTURE RESULTS: SIGNIFICANT CHANGE UP
SPECIMEN SOURCE: SIGNIFICANT CHANGE UP
SPECIMEN SOURCE: SIGNIFICANT CHANGE UP

## (undated) DEVICE — STRYKER SONOPET IQ TUBING SET

## (undated) DEVICE — DRSG XEROFORM 1 X 8"

## (undated) DEVICE — ELCTR GROUNDING PAD ADULT COVIDIEN

## (undated) DEVICE — STAPLER SKIN PROXIMATE

## (undated) DEVICE — DRAPE TOWEL BLUE 17" X 24"

## (undated) DEVICE — DRAPE CRANIOTOMY W POUCH 100X104"

## (undated) DEVICE — ELCTR ROCKER SWITCH PENCIL BLUE 10FT

## (undated) DEVICE — TUBING CONNECTING 6MM 20FT

## (undated) DEVICE — TUBING FOR SMOKE EVACUATOR (PURPLE END)

## (undated) DEVICE — ELCTR BOVIE TIP BLADE INSULATED 4" EDGE

## (undated) DEVICE — PACK NEURO

## (undated) DEVICE — SOL IRR POUR H2O 1000ML

## (undated) DEVICE — ELCTR 4-DISC 20MM 49" (RED, BLUE, GREEN, BLACK)

## (undated) DEVICE — SUT VICRYL PLUS 2-0 18" CP-2 UNDYED (POP-OFF)

## (undated) DEVICE — ELCTR MONOPOLAR STIMULATOR PROBE FLUSH-TIP

## (undated) DEVICE — AESCULAP SCALPFIX 10 CLIPS

## (undated) DEVICE — DRAPE XL SHEET 77X98"

## (undated) DEVICE — VAGINAL PACKING 2"

## (undated) DEVICE — STRYKER SONOPET IQ TIP 12CM STANDARD

## (undated) DEVICE — ELCTR SUBDERMAL CORKSCREW NDL 1.2MM

## (undated) DEVICE — ELCTR SUBDERMAL NDL 27G X 1/2" WITH TWISTED PAIR

## (undated) DEVICE — SUT VICRYL PLUS 0 18" CT-1 UNDYED (POP-OFF)

## (undated) DEVICE — ACRA-CUT CRANIAL PERFORATOR ADULT 14MM X 11MM (WHITE)

## (undated) DEVICE — DRSG TELFA 3 X 8

## (undated) DEVICE — Device

## (undated) DEVICE — SUT NUROLON 4-0 8-18" RB-1

## (undated) DEVICE — MARKING PEN W RULER

## (undated) DEVICE — MIDAS REX MR8 TAPERED SM BORE 2.3MM X 7CM FOOTED

## (undated) DEVICE — SOL IRR POUR NS 0.9% 1000ML

## (undated) DEVICE — SPHERE MARKER FOR BRAIN LAB IMAGE (3 SPHERES)

## (undated) DEVICE — DRAPE MICROSCOPE ZEISS OPMI VISIONGUARD 154 X 52"

## (undated) DEVICE — WARMING BLANKET LOWER ADULT

## (undated) DEVICE — ELCTR SUBDERMAL NDL CLASSIC 1.5M X 59" (6 COLOR)

## (undated) DEVICE — DRSG 4 X 8"

## (undated) DEVICE — DRAPE INSTRUMENT POUCH 6.75" X 11"

## (undated) DEVICE — CLIPPER BLADE NEURO (BLUE)

## (undated) DEVICE — PREP DURAPREP 26CC

## (undated) DEVICE — ELCTR BIPOLAR PROBE

## (undated) DEVICE — ELCTR PEDICLE SCREW PROBE 3MM BALL 1.8MM X 100MM

## (undated) DEVICE — VENODYNE/SCD SLEEVE CALF MEDIUM

## (undated) DEVICE — BIPOLAR FORCEP SYMMETRY BAYONET 7" X 1.5MM SMOOTH (SILVER)

## (undated) DEVICE — CATH IV SAFE BC 18G X 1.16" (GREEN)

## (undated) DEVICE — POSITIONER FOAM EGG CRATE ULNAR 2PCS (PINK)

## (undated) DEVICE — SYR LUER LOK 20CC